# Patient Record
Sex: FEMALE | Race: WHITE | Employment: OTHER | URBAN - METROPOLITAN AREA
[De-identification: names, ages, dates, MRNs, and addresses within clinical notes are randomized per-mention and may not be internally consistent; named-entity substitution may affect disease eponyms.]

---

## 2018-01-04 ENCOUNTER — HOSPITAL ENCOUNTER (OUTPATIENT)
Dept: CT IMAGING | Age: 73
Discharge: HOME | End: 2018-01-04
Attending: UROLOGY | Admitting: UROLOGY

## 2018-01-04 DIAGNOSIS — N39.41 URGE INCONTINENCE: ICD-10-CM

## 2018-01-04 LAB
BUN BLDV-MCNC: 15 MG/DL (ref 7–20)
CREAT SERPL-MCNC: 1.2 MG/DL (ref 0.6–1.2)
GFR AFRICAN AMERICAN: 53
GFR NON-AFRICAN AMERICAN: 44

## 2020-11-06 ENCOUNTER — HOSPITAL ENCOUNTER (OUTPATIENT)
Dept: ULTRASOUND IMAGING | Age: 75
Discharge: HOME | End: 2020-11-06
Payer: MEDICARE

## 2020-11-06 PROCEDURE — 76770 US EXAM ABDO BACK WALL COMP: CPT

## 2022-12-14 ENCOUNTER — APPOINTMENT (OUTPATIENT)
Dept: GENERAL RADIOLOGY | Age: 77
End: 2022-12-14
Payer: MEDICARE

## 2022-12-14 ENCOUNTER — HOSPITAL ENCOUNTER (EMERGENCY)
Age: 77
Discharge: HOME | End: 2022-12-14
Payer: MEDICARE

## 2022-12-14 ENCOUNTER — APPOINTMENT (OUTPATIENT)
Dept: CT IMAGING | Age: 77
End: 2022-12-14
Payer: MEDICARE

## 2022-12-14 VITALS
HEART RATE: 69 BPM | DIASTOLIC BLOOD PRESSURE: 88 MMHG | TEMPERATURE: 98.3 F | OXYGEN SATURATION: 100 % | WEIGHT: 135 LBS | HEIGHT: 67 IN | BODY MASS INDEX: 21.19 KG/M2 | SYSTOLIC BLOOD PRESSURE: 144 MMHG | RESPIRATION RATE: 14 BRPM

## 2022-12-14 DIAGNOSIS — S20.211A CHEST WALL CONTUSION, RIGHT, INITIAL ENCOUNTER: Primary | ICD-10-CM

## 2022-12-14 DIAGNOSIS — R42 LIGHTHEADEDNESS: ICD-10-CM
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
## 2022-12-14 LAB
A/G RATIO: 1.6 (ref 1.1–2.2)
ALBUMIN SERPL-MCNC: 4.5 G/DL (ref 3.4–5)
ALP BLD-CCNC: 70 U/L (ref 40–129)
ALT SERPL-CCNC: 12 U/L (ref 10–40)
ANION GAP SERPL CALCULATED.3IONS-SCNC: 12 MMOL/L (ref 3–16)
AST SERPL-CCNC: 15 U/L (ref 15–37)
BASOPHILS ABSOLUTE: 0 K/UL (ref 0–0.2)
BASOPHILS RELATIVE PERCENT: 0.7 %
BILIRUB SERPL-MCNC: <0.2 MG/DL (ref 0–1)
BUN BLDV-MCNC: 18 MG/DL (ref 7–20)
CALCIUM SERPL-MCNC: 9.2 MG/DL (ref 8.3–10.6)
CHLORIDE BLD-SCNC: 102 MMOL/L (ref 99–110)
CO2: 25 MMOL/L (ref 21–32)
CREAT SERPL-MCNC: 1 MG/DL (ref 0.6–1.2)
EKG ATRIAL RATE: 64 BPM
EKG DIAGNOSIS: NORMAL
EKG P AXIS: 43 DEGREES
EKG P-R INTERVAL: 152 MS
EKG Q-T INTERVAL: 438 MS
EKG QRS DURATION: 78 MS
EKG QTC CALCULATION (BAZETT): 451 MS
EKG R AXIS: 9 DEGREES
EKG T AXIS: 40 DEGREES
EKG VENTRICULAR RATE: 64 BPM
EOSINOPHILS ABSOLUTE: 0.2 K/UL (ref 0–0.6)
EOSINOPHILS RELATIVE PERCENT: 3.4 %
GFR SERPL CREATININE-BSD FRML MDRD: 57 ML/MIN/{1.73_M2}
GLUCOSE BLD-MCNC: 166 MG/DL (ref 70–99)
HCT VFR BLD CALC: 34.1 % (ref 36–48)
HEMOGLOBIN: 11.3 G/DL (ref 12–16)
LYMPHOCYTES ABSOLUTE: 1.1 K/UL (ref 1–5.1)
LYMPHOCYTES RELATIVE PERCENT: 16.5 %
MCH RBC QN AUTO: 28.1 PG (ref 26–34)
MCHC RBC AUTO-ENTMCNC: 33.1 G/DL (ref 31–36)
MCV RBC AUTO: 85 FL (ref 80–100)
MONOCYTES ABSOLUTE: 0.3 K/UL (ref 0–1.3)
MONOCYTES RELATIVE PERCENT: 4.7 %
NEUTROPHILS ABSOLUTE: 5.1 K/UL (ref 1.7–7.7)
NEUTROPHILS RELATIVE PERCENT: 74.7 %
PDW BLD-RTO: 16.1 % (ref 12.4–15.4)
PLATELET # BLD: 206 K/UL (ref 135–450)
PMV BLD AUTO: 8.3 FL (ref 5–10.5)
POTASSIUM SERPL-SCNC: 4.9 MMOL/L (ref 3.5–5.1)
RBC # BLD: 4.01 M/UL (ref 4–5.2)
SODIUM BLD-SCNC: 139 MMOL/L (ref 136–145)
TOTAL PROTEIN: 7.3 G/DL (ref 6.4–8.2)
WBC # BLD: 6.9 K/UL (ref 4–11)

## 2022-12-14 PROCEDURE — 93005 ELECTROCARDIOGRAM TRACING: CPT | Performed by: EMERGENCY MEDICINE

## 2022-12-14 PROCEDURE — 80053 COMPREHEN METABOLIC PANEL: CPT

## 2022-12-14 PROCEDURE — 71045 X-RAY EXAM CHEST 1 VIEW: CPT

## 2022-12-14 PROCEDURE — 85025 COMPLETE CBC W/AUTO DIFF WBC: CPT

## 2022-12-14 PROCEDURE — 99284 EMERGENCY DEPT VISIT MOD MDM: CPT

## 2022-12-14 PROCEDURE — 72125 CT NECK SPINE W/O DYE: CPT

## 2022-12-14 PROCEDURE — 6370000000 HC RX 637 (ALT 250 FOR IP): Performed by: PHYSICIAN ASSISTANT

## 2022-12-14 PROCEDURE — 71250 CT THORAX DX C-: CPT

## 2022-12-14 PROCEDURE — 93010 ELECTROCARDIOGRAM REPORT: CPT | Performed by: INTERNAL MEDICINE

## 2022-12-14 PROCEDURE — 70450 CT HEAD/BRAIN W/O DYE: CPT

## 2022-12-14 RX ORDER — HYDROCODONE BITARTRATE AND ACETAMINOPHEN 5; 325 MG/1; MG/1
0.5 TABLET ORAL ONCE
Status: COMPLETED | OUTPATIENT
Start: 2022-12-14 | End: 2022-12-14

## 2022-12-14 RX ORDER — ACETAMINOPHEN 325 MG/1
650 TABLET ORAL ONCE
Status: COMPLETED | OUTPATIENT
Start: 2022-12-14 | End: 2022-12-14

## 2022-12-14 RX ADMIN — HYDROCODONE BITARTRATE AND ACETAMINOPHEN 0.5 TABLET: 5; 325 TABLET ORAL at 14:38

## 2022-12-14 RX ADMIN — ACETAMINOPHEN 650 MG: 325 TABLET ORAL at 14:38

## 2022-12-14 ASSESSMENT — PAIN DESCRIPTION - LOCATION: LOCATION: BACK

## 2022-12-14 ASSESSMENT — PAIN SCALES - GENERAL
PAINLEVEL_OUTOF10: 7
PAINLEVEL_OUTOF10: 6

## 2022-12-14 ASSESSMENT — PAIN - FUNCTIONAL ASSESSMENT: PAIN_FUNCTIONAL_ASSESSMENT: 0-10

## 2022-12-14 NOTE — DISCHARGE INSTRUCTIONS
Use the incentive spirometer as instructed and given to you at the hospital.  This is to prevent pneumonia. While in the hospital I did give you Tylenol 650 mg and hydrocodone 5 mg. I did obtain CT scan of your head, neck and chest.  No evidence of chest wall fracture. I do believe you have a bruising of your chest wall. I do recommend Tylenol 1000 mg every 6 or 8 hours. I do not recommend anti-inflammatory such as ibuprofen or Aleve. In the emergency room I did give you hydrocodone 1/2 tablet along with Tylenol 650 mg.  I do recommend he follow with your healthcare provider on Friday. I do recommend use of walker and being careful when standing and ambulating at home.

## 2022-12-14 NOTE — ED PROVIDER NOTES
[facility]  ED  EMERGENCY DEPARTMENT ENCOUNTER        Pt Name: Daljit Angulo  MRN: [id number]  Leydi [birth year]  Date of evaluation: 12/14/2022  Provider: Zita Amaya PA-C  PCP: Suzette Miller MD  Note Started: 11:11 AM EST 12/14/22      MAEVE. I have evaluated this patient. My supervising physician was available for consultation. CHIEF COMPLAINT       Chief Complaint   Patient presents with    Fall     Dizzy, fell, pt reports npo LOC, on ASA       HISTORY OF PRESENT ILLNESS   (Location, Timing/Onset, Context/Setting, Quality, Duration, Modifying Factors, Severity, Associated Signs and Symptoms)  Note limiting factors. Chief Complaint: Dizziness, fall    Daljit Angulo is a 76 y.o. female who presents to the emergency department by EMS. The patient called [facility]. The patient presents for evaluation of right chest wall injury. The patient states at 7:45 AM this morning she got up from bed too quickly and had to go the bathroom. She fell on the carpet because she was dizzy. She does experience episodes of dizziness times years and often averaging 1-2 times a week. This is not new. She typically uses a walker but did not use the walker this morning. She indicates not striking head. No headache or neck pain complaint. She is currently not on anticoagulant including aspirin. Her primary complaint is right chest wall pain. Worse with movement, sneeze, cough or deep breath. She does smoke. She indicates no hemoptysis. She indicates no abdominal pain. She indicates no extremity pain complaints. Nursing Notes were all reviewed and agreed with or any disagreements were addressed in the HPI. REVIEW OF SYSTEMS    (2-9 systems for level 4, 10 or more for level 5)     Review of Systems    Positives and Pertinent negatives as per HPI. Except as noted above in the ROS, all other systems were reviewed and negative.        PAST MEDICAL HISTORY     Past Medical History:   Diagnosis Date    Meredith esophagus     Bladder prolapse     Depression     Diabetes mellitus (HCC)     Hyperlipidemia     Osteoporosis     Tachycardia          SURGICAL HISTORY     Past Surgical History:   Procedure Laterality Date    BACK SURGERY      cervical    BLADDER SUSPENSION      X 3    CHOLECYSTECTOMY      COLONOSCOPY  5-3-16    normal    HYSTERECTOMY (CERVIX STATUS UNKNOWN)      NOSE SURGERY      fracture    TOE SURGERY      UPPER GASTROINTESTINAL ENDOSCOPY  2/11/13    UPPER GASTROINTESTINAL ENDOSCOPY  5-3-16    biopsy esophagus + dilatation    WRIST SURGERY           CURRENTMEDICATIONS       Previous Medications    BISOPROLOL (ZEBETA) 5 MG TABLET    Take 5 mg by mouth daily. METFORMIN (GLUCOPHAGE) 500 MG TABLET    Take 500 mg by mouth 2 times daily (with meals). OMEPRAZOLE (PRILOSEC) 20 MG CAPSULE    Take 1 capsule by mouth 2 times daily. OXYBUTYNIN (DITROPAN XL) 15 MG CR TABLET    Take 15 mg by mouth daily. SIMVASTATIN (ZOCOR) 20 MG TABLET    Take 20 mg by mouth nightly. VITAMIN D (ERGOCALCIFEROL) 13230 UNITS CAPS CAPSULE    Take 50,000 Units by mouth once a week    ZOLPIDEM (AMBIEN) 10 MG TABLET    Take 10 mg by mouth nightly as needed. ALLERGIES     Food    FAMILYHISTORY       Family History   Problem Relation Age of Onset    Diabetes Father     Cancer Sister         cervical    Diabetes Sister           SOCIAL HISTORY       Social History     Tobacco Use    Smoking status: Every Day     Packs/day: 0.50     Types: Cigarettes   Substance Use Topics    Alcohol use:  Yes     Alcohol/week: 2.0 standard drinks     Types: 2 Cans of beer per week       SCREENINGS        [place] Coma Scale  Eye Opening: Spontaneous  Best Verbal Response: Oriented  Best Motor Response: Obeys commands  Dina Coma Scale Score: 15                CIWA Assessment  BP: (!) 144/88  Heart Rate: 69             PHYSICAL EXAM    (up to 7 for level 4, 8 or more for level 5)     ED Triage Vitals [12/14/22 1041]   BP Temp Temp Source Heart Rate Resp SpO2 Height Weight   (!) 157/59 98 °F (36.7 °C) Oral 52 14 100 % 5' 7\" (1.702 m) 135 lb (61.2 kg)       Physical Exam  Vitals and nursing note reviewed. Constitutional:       Appearance: Normal appearance. She is well-developed and normal weight. HENT:      Head: Normocephalic and atraumatic. Right Ear: External ear normal.      Left Ear: External ear normal.   Eyes:      General: No scleral icterus. Right eye: No discharge. Left eye: No discharge. Extraocular Movements: Extraocular movements intact. Conjunctiva/sclera: Conjunctivae normal.      Pupils: Pupils are equal, round, and reactive to light. Cardiovascular:      Rate and Rhythm: Normal rate and regular rhythm. Heart sounds: Normal heart sounds. Pulmonary:      Effort: Pulmonary effort is normal.      Breath sounds: Normal breath sounds. Comments: The patient with chest wall tenderness on the right lateral chest wall. No rotation or instability. No skin cutaneous air. Breath sounds are equal bilaterally. Chest:      Chest wall: Tenderness present. Abdominal:      General: Abdomen is flat. Bowel sounds are normal.      Palpations: Abdomen is soft. Tenderness: There is no abdominal tenderness. Musculoskeletal:         General: No tenderness. Normal range of motion. Cervical back: Normal range of motion and neck supple. No tenderness. Right lower leg: No edema. Left lower leg: No edema. Comments: I do palpate the patient's entire spine. No pain elicited. Skin:     General: Skin is warm and dry. Neurological:      General: No focal deficit present. Mental Status: She is alert and oriented to person, place, and time. Mental status is at baseline. Psychiatric:         Mood and Affect: Mood normal.         Behavior: Behavior normal.         Thought Content:  Thought content normal.         Judgment: Judgment normal. DIAGNOSTIC RESULTS   LABS:    Labs Reviewed   COMPREHENSIVE METABOLIC PANEL - Abnormal; Notable for the following components:       Result Value    Glucose 166 (*)     Est, Glom Filt Rate 57 (*)     All other components within normal limits   CBC WITH AUTO DIFFERENTIAL - Abnormal; Notable for the following components:    Hemoglobin 11.3 (*)     Hematocrit 34.1 (*)     RDW 16.1 (*)     All other components within normal limits   URINALYSIS       When ordered only abnormal lab results are displayed. All other labs were within normal range or not returned as of this dictation. EKG: When ordered, EKG's are interpreted by the Emergency Department Physician in the absence of a cardiologist.  Please see their note for interpretation of EKG. RADIOLOGY:   Non-plain film images such as CT, Ultrasound and MRI are read by the radiologist. Plain radiographic images are visualized and preliminarily interpreted by the ED Provider with the below findings:        Interpretation per the Radiologist below, if available at the time of this note:    CT CSpine W/O Contrast   Final Result   1. No acute cervical spine fracture. 2. Spinal degenerative changes as described. 3. Straightening of the spine which could be related to patient positioning,   degenerative changes or muscle spasm. CT Head W/O Contrast   Final Result   No acute intracranial abnormality. CT CHEST WO CONTRAST   Final Result   1. No acute cardiopulmonary findings or acute fractures. 2.  Small hiatal hernia. XR CHEST PORTABLE   Final Result   1. No acute airspace infiltrates. 2.  6 mm pulmonary nodule in the left upper lobe. This could be further   evaluated with a CT chest as an outpatient per [facility]   guidelines. No results found. No results found.     PROCEDURES   Unless otherwise noted below, none     Procedures    CRITICAL CARE TIME       CONSULTS:  None      EMERGENCY DEPARTMENT COURSE and DIFFERENTIAL DIAGNOSIS/MDM:   Vitals:    Vitals:    12/14/22 1041 12/14/22 1359   BP: (!) 157/59 (!) 144/88   Pulse: 52 69   Resp: 14 14   Temp: 98 °F (36.7 °C) 98.3 °F (36.8 °C)   TempSrc: Oral Oral   SpO2: 100% 100%   Weight: 135 lb (61.2 kg)    Height: 5' 7\" (1.702 m)        Patient was given the following medications:  Medications   acetaminophen (TYLENOL) tablet 650 mg (has no administration in time range)   HYDROcodone-acetaminophen (NORCO) 5-325 MG per tablet 0.5 tablet (has no administration in time range)         Is this patient to be included in the SEP-1 Core Measure due to severe sepsis or septic shock? No   Exclusion criteria - the patient is NOT to be included for SEP-1 Core Measure due to: Infection is not suspected    This patient with history of lightheadedness/dizziness occurring times years and occurring once or twice a week. She does report needing to get out of bed go to the bathroom she did so rather quickly and did not use walker. She sustained a fall event. Does not recall striking head however she did strike the right lateral chest wall. I did obtain CT scan head, neck and chest.  No fractures particular no rib fractures. She is quite tender on exam.  I did have nursing staff provide incentive spirometer with instructions. She was given Tylenol 650 mg p.o. along with hydrocodone 5 mg 1/2 tablet. I do recommend at home using incentive spirometer as well as Tylenol 1000 mg every 6 or 8 hours. I recommended follow with her PCP on Friday. I have instructed the patient to get up slowly and carefully and to use her walker. She agrees and expresses understanding. FINAL IMPRESSION      1. Chest wall contusion, right, initial encounter    2. Lightheadedness    3.  Fall, initial encounter          DISPOSITION/PLAN   DISPOSITION Decision To Discharge 12/14/2022 02:26:30 PM      PATIENT REFERRED TO:  MD Martínez Yates [id number]  [phone]    Schedule an appointment as soon as possible for a visit in 2 days      [facility]  ED  [address]  Go to   If symptoms worsen    DISCHARGE MEDICATIONS:  New Prescriptions    No medications on file       DISCONTINUED MEDICATIONS:  Discontinued Medications    No medications on file              (Please note that portions of this note were completed with a voice recognition program.  Efforts were made to edit the dictations but occasionally words are mis-transcribed. )    Terrence Monge PA-C (electronically signed)            Terrence Monge PA-C  12/14/22 7783

## 2022-12-14 NOTE — ED PROVIDER NOTES
I was not asked to see this patient. I was available for consultation if deemed necessary. I was only asked to interpret the EKG. Please see REBECCA Rodriguez's note for care of the patient while in the emergency department and for final disposition. The Ekg interpreted by me shows  normal sinus rhythm with a rate of 64  Axis is   Normal  QTc is  normal  Intervals and Durations are unremarkable.       ST Segments: nonspecific changes  No significant change from prior EKG dated - no old EKG  No STEMI           Geoffrey Ibanez MD  12/14/22 7114

## 2023-08-07 ENCOUNTER — APPOINTMENT (OUTPATIENT)
Dept: CT IMAGING | Age: 78
End: 2023-08-07
Payer: MEDICARE

## 2023-08-07 ENCOUNTER — HOSPITAL ENCOUNTER (EMERGENCY)
Age: 78
Discharge: HOME | End: 2023-08-07
Payer: MEDICARE

## 2023-08-07 VITALS
RESPIRATION RATE: 16 BRPM | HEIGHT: 68 IN | OXYGEN SATURATION: 97 % | BODY MASS INDEX: 20.46 KG/M2 | TEMPERATURE: 98.6 F | HEART RATE: 72 BPM | DIASTOLIC BLOOD PRESSURE: 63 MMHG | WEIGHT: 135 LBS | SYSTOLIC BLOOD PRESSURE: 134 MMHG

## 2023-08-07 DIAGNOSIS — E83.42 HYPOMAGNESEMIA: ICD-10-CM

## 2023-08-07 DIAGNOSIS — R07.89 LEFT-SIDED CHEST WALL PAIN: ICD-10-CM

## 2023-08-07 DIAGNOSIS — E86.0 DEHYDRATION: ICD-10-CM
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
## 2023-08-07 LAB
ALBUMIN SERPL-MCNC: 4.4 G/DL (ref 3.4–5)
ALBUMIN/GLOB SERPL: 1.3 {RATIO} (ref 1.1–2.2)
ALP SERPL-CCNC: 70 U/L (ref 40–129)
ALT SERPL-CCNC: 17 U/L (ref 10–40)
ANION GAP SERPL CALCULATED.3IONS-SCNC: 16 MMOL/L (ref 3–16)
AST SERPL-CCNC: 17 U/L (ref 15–37)
BASOPHILS # BLD: 0.1 K/UL (ref 0–0.2)
BASOPHILS NFR BLD: 0.6 %
BILIRUB SERPL-MCNC: <0.2 MG/DL (ref 0–1)
BILIRUB UR QL STRIP.AUTO: NEGATIVE
BUN SERPL-MCNC: 16 MG/DL (ref 7–20)
CALCIUM SERPL-MCNC: 9.3 MG/DL (ref 8.3–10.6)
CHLORIDE SERPL-SCNC: 102 MMOL/L (ref 99–110)
CK SERPL-CCNC: 67 U/L (ref 26–192)
CLARITY UR: CLEAR
CO2 SERPL-SCNC: 18 MMOL/L (ref 21–32)
COLOR UR: YELLOW
CREAT SERPL-MCNC: 1.4 MG/DL (ref 0.6–1.2)
DEPRECATED RDW RBC AUTO: 17.3 % (ref 12.4–15.4)
EKG ATRIAL RATE: 81 BPM
EKG DIAGNOSIS: NORMAL
EKG P AXIS: 35 DEGREES
EKG P-R INTERVAL: 150 MS
EKG Q-T INTERVAL: 362 MS
EKG QRS DURATION: 66 MS
EKG QTC CALCULATION (BAZETT): 420 MS
EKG R AXIS: -4 DEGREES
EKG T AXIS: 21 DEGREES
EKG VENTRICULAR RATE: 81 BPM
EOSINOPHIL # BLD: 0.3 K/UL (ref 0–0.6)
EOSINOPHIL NFR BLD: 2.6 %
EPI CELLS #/AREA URNS HPF: ABNORMAL /HPF (ref 0–5)
GFR SERPLBLD CREATININE-BSD FMLA CKD-EPI: 38 ML/MIN/{1.73_M2}
GLUCOSE SERPL-MCNC: 175 MG/DL (ref 70–99)
GLUCOSE UR STRIP.AUTO-MCNC: NEGATIVE MG/DL
HCT VFR BLD AUTO: 34.1 % (ref 36–48)
HGB BLD-MCNC: 10.8 G/DL (ref 12–16)
HGB UR QL STRIP.AUTO: NEGATIVE
KETONES UR STRIP.AUTO-MCNC: ABNORMAL MG/DL
LEUKOCYTE ESTERASE UR QL STRIP.AUTO: ABNORMAL
LYMPHOCYTES # BLD: 1.6 K/UL (ref 1–5.1)
LYMPHOCYTES NFR BLD: 12.3 %
MAGNESIUM SERPL-MCNC: 1.1 MG/DL (ref 1.8–2.4)
MCH RBC QN AUTO: 27.4 PG (ref 26–34)
MCHC RBC AUTO-ENTMCNC: 31.6 G/DL (ref 31–36)
MCV RBC AUTO: 86.6 FL (ref 80–100)
MONOCYTES # BLD: 0.6 K/UL (ref 0–1.3)
MONOCYTES NFR BLD: 4.9 %
NEUTROPHILS # BLD: 10.2 K/UL (ref 1.7–7.7)
NEUTROPHILS NFR BLD: 79.6 %
NITRITE UR QL STRIP.AUTO: NEGATIVE
PH UR STRIP.AUTO: 6 [PH] (ref 5–8)
PLATELET # BLD AUTO: 281 K/UL (ref 135–450)
PMV BLD AUTO: 9.3 FL (ref 5–10.5)
POTASSIUM SERPL-SCNC: 5.3 MMOL/L (ref 3.5–5.1)
PROT SERPL-MCNC: 7.7 G/DL (ref 6.4–8.2)
PROT UR STRIP.AUTO-MCNC: NEGATIVE MG/DL
RBC # BLD AUTO: 3.94 M/UL (ref 4–5.2)
RBC #/AREA URNS HPF: ABNORMAL /HPF (ref 0–4)
SODIUM SERPL-SCNC: 136 MMOL/L (ref 136–145)
SP GR UR STRIP.AUTO: 1.01 (ref 1–1.03)
TROPONIN, HIGH SENSITIVITY: 9 NG/L (ref 0–14)
UA DIPSTICK W REFLEX MICRO PNL UR: YES
URN SPEC COLLECT METH UR: ABNORMAL
UROBILINOGEN UR STRIP-ACNC: 0.2 E.U./DL
WBC # BLD AUTO: 12.8 K/UL (ref 4–11)
WBC #/AREA URNS HPF: ABNORMAL /HPF (ref 0–5)

## 2023-08-07 PROCEDURE — 81001 URINALYSIS AUTO W/SCOPE: CPT

## 2023-08-07 PROCEDURE — 96366 THER/PROPH/DIAG IV INF ADDON: CPT

## 2023-08-07 PROCEDURE — 83735 ASSAY OF MAGNESIUM: CPT

## 2023-08-07 PROCEDURE — 96365 THER/PROPH/DIAG IV INF INIT: CPT

## 2023-08-07 PROCEDURE — 99284 EMERGENCY DEPT VISIT MOD MDM: CPT

## 2023-08-07 PROCEDURE — 93010 ELECTROCARDIOGRAM REPORT: CPT | Performed by: INTERNAL MEDICINE

## 2023-08-07 PROCEDURE — 82550 ASSAY OF CK (CPK): CPT

## 2023-08-07 PROCEDURE — 85025 COMPLETE CBC W/AUTO DIFF WBC: CPT

## 2023-08-07 PROCEDURE — 6360000002 HC RX W HCPCS: Performed by: PHYSICIAN ASSISTANT

## 2023-08-07 PROCEDURE — 2580000003 HC RX 258: Performed by: PHYSICIAN ASSISTANT

## 2023-08-07 PROCEDURE — 72125 CT NECK SPINE W/O DYE: CPT

## 2023-08-07 PROCEDURE — 80053 COMPREHEN METABOLIC PANEL: CPT

## 2023-08-07 PROCEDURE — 93005 ELECTROCARDIOGRAM TRACING: CPT | Performed by: PHYSICIAN ASSISTANT

## 2023-08-07 PROCEDURE — 6370000000 HC RX 637 (ALT 250 FOR IP): Performed by: PHYSICIAN ASSISTANT

## 2023-08-07 PROCEDURE — 84484 ASSAY OF TROPONIN QUANT: CPT

## 2023-08-07 PROCEDURE — 71250 CT THORAX DX C-: CPT

## 2023-08-07 PROCEDURE — 70450 CT HEAD/BRAIN W/O DYE: CPT

## 2023-08-07 PROCEDURE — 96375 TX/PRO/DX INJ NEW DRUG ADDON: CPT

## 2023-08-07 RX ORDER — MAGNESIUM SULFATE IN WATER 40 MG/ML
2000 INJECTION, SOLUTION INTRAVENOUS ONCE
Status: COMPLETED | OUTPATIENT
Start: 2023-08-07 | End: 2023-08-07

## 2023-08-07 RX ORDER — ONDANSETRON 2 MG/ML
4 INJECTION INTRAMUSCULAR; INTRAVENOUS ONCE
Status: COMPLETED | OUTPATIENT
Start: 2023-08-07 | End: 2023-08-07

## 2023-08-07 RX ORDER — OXYCODONE HYDROCHLORIDE AND ACETAMINOPHEN 5; 325 MG/1; MG/1
1 TABLET ORAL ONCE
Status: COMPLETED | OUTPATIENT
Start: 2023-08-07 | End: 2023-08-07

## 2023-08-07 RX ORDER — MORPHINE SULFATE 4 MG/ML
4 INJECTION, SOLUTION INTRAMUSCULAR; INTRAVENOUS ONCE
Status: COMPLETED | OUTPATIENT
Start: 2023-08-07 | End: 2023-08-07

## 2023-08-07 RX ORDER — 0.9 % SODIUM CHLORIDE 0.9 %
1000 INTRAVENOUS SOLUTION INTRAVENOUS ONCE
Status: COMPLETED | OUTPATIENT
Start: 2023-08-07 | End: 2023-08-07

## 2023-08-07 RX ORDER — OXYCODONE HYDROCHLORIDE AND ACETAMINOPHEN 5; 325 MG/1; MG/1
1 TABLET ORAL EVERY 6 HOURS PRN
Qty: 12 TABLET | Refills: 0 | Status: SHIPPED | OUTPATIENT
Start: 2023-08-07 | End: 2023-08-10

## 2023-08-07 RX ADMIN — ONDANSETRON 4 MG: 2 INJECTION INTRAMUSCULAR; INTRAVENOUS at 10:32

## 2023-08-07 RX ADMIN — SODIUM CHLORIDE 1000 ML: 9 INJECTION, SOLUTION INTRAVENOUS at 11:51

## 2023-08-07 RX ADMIN — MORPHINE SULFATE 4 MG: 4 INJECTION, SOLUTION INTRAMUSCULAR; INTRAVENOUS at 10:33

## 2023-08-07 RX ADMIN — OXYCODONE HYDROCHLORIDE AND ACETAMINOPHEN 1 TABLET: 5; 325 TABLET ORAL at 15:15

## 2023-08-07 RX ADMIN — MAGNESIUM SULFATE HEPTAHYDRATE 2000 MG: 40 INJECTION, SOLUTION INTRAVENOUS at 11:55

## 2023-08-07 ASSESSMENT — PAIN SCALES - GENERAL
PAINLEVEL_OUTOF10: 9
PAINLEVEL_OUTOF10: 4
PAINLEVEL_OUTOF10: 10
PAINLEVEL_OUTOF10: 10

## 2023-08-07 ASSESSMENT — PAIN DESCRIPTION - LOCATION
LOCATION: RIB CAGE

## 2023-08-07 ASSESSMENT — PAIN DESCRIPTION - ORIENTATION
ORIENTATION: LEFT

## 2023-08-07 ASSESSMENT — PAIN DESCRIPTION - DESCRIPTORS: DESCRIPTORS: SHARP

## 2023-08-07 ASSESSMENT — PAIN - FUNCTIONAL ASSESSMENT: PAIN_FUNCTIONAL_ASSESSMENT: 0-10

## 2023-08-07 NOTE — CARE COORDINATION
Case Management Assessment  Initial Evaluation    Date/Time of Evaluation: 8/7/2023 2:58 PM  Assessment Completed by: TAJ Taylor    If patient is discharged prior to next notation, then this note serves as note for discharge by case management. Patient Name: Holli Liz                   YOB: 1945  Diagnosis: No admission diagnoses are documented for this encounter. Date / Time: 8/7/2023  9:49 AM    Patient Admission Status: Emergency   Readmission Risk (Low < 19, Mod (19-27), High > 27): No data recorded  Current PCP: Ludwig Contreras MD  PCP verified by CM? (P) Yes    Chart Reviewed: Yes      History Provided by: (P) Patient, Child/Family  Patient Orientation: (P) Alert and Oriented    Patient Cognition: (P) Alert    Hospitalization in the last 30 days (Readmission):  No    If yes, Readmission Assessment in  Navigator will be completed.     Advance Directives:      Code Status: Not on file   Patient's Primary Decision Maker is: (P) Legal Next of Kin      Discharge Planning:    Patient lives with: (P) Alone Type of Home: (P) House  Primary Care Giver: (P) Self  Patient Support Systems include: (P) Children   Current Financial resources: (P) None  Current community resources: (P) ECF/Home Care  Current services prior to admission: (P) Durable Medical Equipment            Current DME: (P) Walker            Type of Home Care services:  (P) PT, OT, Nursing Services    ADLS  Prior functional level: (P) Independent in ADLs/IADLs  Current functional level: (P) Independent in ADLs/IADLs    PT AM-PAC:   /24  OT AM-PAC:   /24    Family can provide assistance at DC: (P) Yes  Would you like Case Management to discuss the discharge plan with any other family members/significant others, and if so, who? (P) No  Plans to Return to Present Housing: (P) Yes  Other Identified Issues/Barriers to RETURNING to current housing: none noted  Potential Assistance needed at discharge: (P)

## 2023-08-07 NOTE — ED PROVIDER NOTES
fall and chest injury. CBC with mild leukocytosis and anemia which according to chart review does appear baseline. CMP with mildly elevated potassium at 5.3 with patient's having also slight elevations in BUN and creatinine at 16 and 1.4. She was given a 1 L IV fluid bolus along with 2 g of magnesium sulfate for her hypomagnesemia at 1.1. Troponin of 9. Urinalysis 6-9 whites with a few epithelial cells present. Total CK of 67. CT of the head and cervical spine were negative for traumatic injuries. EKG appeared consistent with a sinus rhythm and PACs. Nonischemic otherwise. Did obtain a CT of the chest demonstrating no acute rib fractures and/or pneumothoraces. Patient was offered admission for her fall, chest wall injury and inability to get up. Discharged with return precautions as well as recommendations for follow-up. She was provided with an incentive spirometer and educated on its use. Patient is in agreement and comfortable with discharge    Critical Care Time:   10 Minutes of critical care time spent not including separately billable procedures. DDx:  I estimate there is LOW risk for ACUTE CORONARY SYNDROME, INTRACRANIAL HEMORRHAGE, MALIGNANT DYSRHYTHMIA, MENINGITIS, PNEUMONIA, PULMONARY EMBOLISM, SEPSIS, SUBARACHNOID HEMORRHAGE, SUBDURAL HEMATOMA, STROKE, or URINARY TRACT INFECTION, thus I consider the discharge disposition reasonable. Matteo Madrid and I have also discussed returning to the Emergency Department immediately if new or worsening symptoms occur. We have discussed the symptoms which are most concerning (e.g., changing or worsening pain, weakness, vomiting, fever) that necessitate immediate return. FINAL IMPRESSION  1. Fall, initial encounter    2. Left-sided chest wall pain    3. Dehydration    4. Hypomagnesemia      Patient referred to:   Gianluca Holder, [address]  [phone]    Schedule an appointment as soon as possible for a visit

## 2023-08-07 NOTE — ED NOTES
Patient identified as a positive fall risk on the ED triage fall screening. Patient placed in fall precautions which includes:  yellow fall risk bracelet on wrist and yellow socks on feet. Patient instructed on importance of not getting out of bed or ambulating without assistance for safety. Pt verbalized understanding.        Damian Gates RN  08/07/23 3090

## 2023-08-07 NOTE — CARE COORDINATION
[facility]    Referral received from CM to follow for home care services. I will follow for needs, and speak with patient to verify demos.     Sn/pt/ot    Fall;dm    Rocio Crowder RN, BSN CTN  [facility] [phone]

## 2023-08-07 NOTE — ED NOTES
Pt d/c from ED with all discharge paperwork. Pt verbalized understanding of education. Pt educated on Incentive spirometry and used teach back method to show understanding. Pt pushed out of ED in wheelchair, and being transported home with son. Pain management discussed during discharge teaching. No additional needs or questions.        Fredis Menjivar RN  08/07/23 4353

## 2023-10-27 ENCOUNTER — OFFICE VISIT (OUTPATIENT)
Dept: ORTHOPEDIC SURGERY | Age: 78
End: 2023-10-27

## 2023-10-27 VITALS — HEIGHT: 68 IN | BODY MASS INDEX: 20.46 KG/M2 | WEIGHT: 135 LBS

## 2023-10-27 DIAGNOSIS — S42.292A OTHER CLOSED DISPLACED FRACTURE OF PROXIMAL END OF LEFT HUMERUS, INITIAL ENCOUNTER: Primary | ICD-10-CM

## 2023-10-27 DIAGNOSIS — M25.512 PAIN OF LEFT SHOULDER REGION: ICD-10-CM

## 2023-10-27 RX ORDER — TRAMADOL HYDROCHLORIDE 50 MG/1
50 TABLET ORAL EVERY 6 HOURS PRN
Qty: 28 TABLET | Refills: 0 | Status: SHIPPED | OUTPATIENT
Start: 2023-10-27 | End: 2023-11-03

## 2023-10-30 ENCOUNTER — OFFICE VISIT (OUTPATIENT)
Dept: ORTHOPEDIC SURGERY | Age: 78
End: 2023-10-30
Payer: MEDICARE

## 2023-10-30 VITALS — BODY MASS INDEX: 20.46 KG/M2 | WEIGHT: 135 LBS | HEIGHT: 68 IN

## 2023-10-30 DIAGNOSIS — S42.292A OTHER CLOSED DISPLACED FRACTURE OF PROXIMAL END OF LEFT HUMERUS, INITIAL ENCOUNTER: Primary | ICD-10-CM

## 2023-10-30 DIAGNOSIS — M25.512 LEFT SHOULDER PAIN, UNSPECIFIED CHRONICITY: ICD-10-CM

## 2023-10-30 PROCEDURE — 1123F ACP DISCUSS/DSCN MKR DOCD: CPT | Performed by: ORTHOPAEDIC SURGERY

## 2023-10-30 PROCEDURE — 99204 OFFICE O/P NEW MOD 45 MIN: CPT | Performed by: ORTHOPAEDIC SURGERY

## 2023-10-30 NOTE — PROGRESS NOTES
Outpatient Medications:     traMADol (ULTRAM) 50 MG tablet, Take 1 tablet by mouth every 6 hours as needed for Pain for up to 7 days. Intended supply: 7 days. Take lowest dose possible to manage pain Max Daily Amount: 200 mg, Disp: 28 tablet, Rfl: 0    Magnesium 200 MG CHEW, Take 1 tablet by mouth daily, Disp: 4 tablet, Rfl: 0    vitamin D (ERGOCALCIFEROL) 81041 UNITS CAPS capsule, Take 1 capsule by mouth once a week, Disp: , Rfl:     simvastatin (ZOCOR) 20 MG tablet, Take 1 tablet by mouth nightly, Disp: , Rfl:     oxybutynin (DITROPAN XL) 15 MG CR tablet, Take 1 tablet by mouth daily, Disp: , Rfl:     omeprazole (PRILOSEC) 20 MG capsule, Take 1 capsule by mouth 2 times daily. , Disp: 30 capsule, Rfl: 3    bisoprolol (ZEBETA) 5 MG tablet, Take 1 tablet by mouth daily, Disp: , Rfl:     metformin (GLUCOPHAGE) 500 MG tablet, Take 1 tablet by mouth 2 times daily (with meals), Disp: , Rfl:      Social history: Denies IV drug use. Social History     Socioeconomic History    Marital status:      Spouse name: Not on file    Number of children: Not on file    Years of education: Not on file    Highest education level: Not on file   Occupational History    Not on file   Tobacco Use    Smoking status: Every Day     Packs/day: .5     Types: Cigarettes    Smokeless tobacco: Not on file   Substance and Sexual Activity    Alcohol use: Yes     Alcohol/week: 2.0 standard drinks of alcohol     Types: 2 Cans of beer per week    Drug use: Not on file    Sexual activity: Not on file   Other Topics Concern    Not on file   Social History Narrative    Not on file     Social Determinants of Health     Financial Resource Strain: Not on file   Food Insecurity: Not on file   Transportation Needs: Not on file   Physical Activity: Not on file   Stress: Not on file   Social Connections: Not on file   Intimate Partner Violence: Not on file   Housing Stability: Not on file     Tobacco use.     Social History     Tobacco Use   Smoking

## 2023-11-06 ENCOUNTER — OFFICE VISIT (OUTPATIENT)
Dept: ORTHOPEDIC SURGERY | Age: 78
End: 2023-11-06
Payer: MEDICARE

## 2023-11-06 VITALS — HEIGHT: 67 IN | WEIGHT: 135 LBS | BODY MASS INDEX: 21.19 KG/M2

## 2023-11-06 DIAGNOSIS — S42.292D OTHER CLOSED DISPLACED FRACTURE OF PROXIMAL END OF LEFT HUMERUS WITH ROUTINE HEALING, SUBSEQUENT ENCOUNTER: Primary | ICD-10-CM

## 2023-11-06 DIAGNOSIS — M25.512 LEFT SHOULDER PAIN, UNSPECIFIED CHRONICITY: ICD-10-CM

## 2023-11-06 PROCEDURE — 99213 OFFICE O/P EST LOW 20 MIN: CPT | Performed by: ORTHOPAEDIC SURGERY

## 2023-11-06 PROCEDURE — 1123F ACP DISCUSS/DSCN MKR DOCD: CPT | Performed by: ORTHOPAEDIC SURGERY

## 2023-11-06 RX ORDER — ATORVASTATIN CALCIUM 40 MG/1
1 TABLET, FILM COATED ORAL DAILY
COMMUNITY
Start: 2023-04-27

## 2023-11-06 RX ORDER — LORAZEPAM 0.5 MG/1
0.5 TABLET ORAL DAILY PRN
COMMUNITY
Start: 2020-10-25

## 2023-11-06 RX ORDER — KETOCONAZOLE 20 MG/G
CREAM TOPICAL
COMMUNITY
Start: 2020-10-06

## 2023-11-06 RX ORDER — FLUTICASONE PROPIONATE 50 MCG
SPRAY, SUSPENSION (ML) NASAL
COMMUNITY
Start: 2020-08-03

## 2023-11-06 RX ORDER — VALACYCLOVIR HYDROCHLORIDE 1 G/1
TABLET, FILM COATED ORAL
COMMUNITY
Start: 2020-04-16

## 2023-11-06 RX ORDER — MECLIZINE HCL 12.5 MG/1
TABLET ORAL
COMMUNITY
Start: 2023-08-24

## 2023-11-06 NOTE — PROGRESS NOTES
have any difficulty with working through her urologist  -I did review with her that there is angulation present albeit still impacted fracture. I did review with her likelihood for change in overall functional range of motion after healing versus surgical intervention. Understand the risk and benefits of nonoperative versus operative measures, she is adamant at not pursuing surgical intervention. At this time nonoperative nonsurgical management will be continued.  -All questions answered to the patient's satisfaction and the patient expressed understanding and agreement with the above listed treatment plan  -Follow up in 1 week's time with repeat 4 view left shoulder substituted with [place]  -Thank you for the clinical consultation and allowing me to participate in the patient's care. Electronically signed by Sharan Jain MD on 11/6/23 at 1:19 PM TALA Jain MD       Orthopaedic Surgery-Sports Medicine    Disclaimer: This note was dictated with voice recognition software. Though review and correction are routinely performed, please contact the office/medical records for any errors requiring correction.

## 2023-11-13 ENCOUNTER — OFFICE VISIT (OUTPATIENT)
Dept: ORTHOPEDIC SURGERY | Age: 78
End: 2023-11-13
Payer: MEDICARE

## 2023-11-13 VITALS — HEIGHT: 67 IN | WEIGHT: 135 LBS | BODY MASS INDEX: 21.19 KG/M2

## 2023-11-13 DIAGNOSIS — S42.292D OTHER CLOSED DISPLACED FRACTURE OF PROXIMAL END OF LEFT HUMERUS WITH ROUTINE HEALING, SUBSEQUENT ENCOUNTER: Primary | ICD-10-CM

## 2023-11-13 PROCEDURE — 99213 OFFICE O/P EST LOW 20 MIN: CPT | Performed by: ORTHOPAEDIC SURGERY

## 2023-11-13 PROCEDURE — 1123F ACP DISCUSS/DSCN MKR DOCD: CPT | Performed by: ORTHOPAEDIC SURGERY

## 2023-11-13 NOTE — PROGRESS NOTES
discontinue the sling at that time for daytime use but will still sleep it for an additional 2 weeks and will have a 2 to 5 pound weight limit at that time. For the time being, NWB and sling use till follow up  -OTC Tylenol per bottle prn discomfort. I recommended continued healthy diet rich in calcium and vitamin D  -I also recommended continue smoking cessation  -All questions answered to the patient's satisfaction and the patient expressed understanding and agreement with the above listed treatment plan  -Follow up in 3 weeks  -Thank you for the clinical consultation and allowing me to participate in the patient's care. Electronically signed by Franck Blunt MD on 11/13/23 at 1:56 PM EST         Franck Blunt MD       Orthopaedic Surgery-Sports Medicine    Disclaimer: This note was dictated with voice recognition software. Though review and correction are routinely performed, please contact the office/medical records for any errors requiring correction.

## 2023-12-04 ENCOUNTER — HOSPITAL ENCOUNTER (OUTPATIENT)
Dept: PHYSICAL THERAPY | Age: 78
Setting detail: THERAPIES SERIES
Discharge: HOME | End: 2023-12-04
Payer: MEDICARE

## 2023-12-04 DIAGNOSIS — M25.512 CHRONIC LEFT SHOULDER PAIN: ICD-10-CM

## 2023-12-04 DIAGNOSIS — S42.292D OTHER CLOSED DISPLACED FRACTURE OF PROXIMAL END OF LEFT HUMERUS WITH ROUTINE HEALING, SUBSEQUENT ENCOUNTER: Primary | ICD-10-CM

## 2023-12-04 DIAGNOSIS — G89.29 CHRONIC LEFT SHOULDER PAIN: ICD-10-CM

## 2023-12-04 PROCEDURE — 97161 PT EVAL LOW COMPLEX 20 MIN: CPT

## 2023-12-04 PROCEDURE — 97110 THERAPEUTIC EXERCISES: CPT

## 2023-12-04 PROCEDURE — 97140 MANUAL THERAPY 1/> REGIONS: CPT

## 2023-12-04 NOTE — FLOWSHEET NOTE
[address] [place] [address] and [facility], [address], [address] office: [phone] fax: [phone]    Physical Therapy: TREATMENT/PROGRESS NOTE   Patient: Yanique Terrazas (48 y.o. female)   Treatment Date: 2023   :  1945 MRN: [id number]   Visit #: 1   Insurance Allowable Auth Needed   Req [place] Basket [x]Yes    []No    Insurance: Payor: Omalis Speed / Plan: Nicko Garcia ESSENTIAL/PLUS / Product Type: *No Product type* /   Insurance ID: [id number] - (Medicare Managed)  Secondary Insurance (if applicable):    Treatment Diagnosis: L shoulder pain M25.512    ICD-10-CM    1. Other closed displaced fracture of proximal end of left humerus with routine healing, subsequent encounter  S42.292D       2.  Chronic left shoulder pain  M25.512     G89.29          Medical Diagnosis:    Other closed displaced fracture of proximal end of left humerus with routine healing, subsequent encounter [S42.292D]   Referring Physician: Janie Lamb MD  PCP: Andrei Medina MD                             Plan of care signed (Y/N):     Date of Patient follow up with Physician:      Progress Report/POC: EVAL today  Progress note due:  1/3/2024 (OR 10 visits /OR [address], whichever is less)  POC update due:  3/1/2024     Latex Allergy:  [x]NO      []YES    Preferred Language for Healthcare:   [x]English       []other:    SUBJECTIVE EXAMINATION     Patient Report/Comments: see eval    OBJECTIVE EXAMINATION     Observation:     Test measurements: see eval     Test used Initial score  23   Pain Summary VAS 4-7    Functional questionnaire Quick DASH 77%    ROM             MMT              RESTRICTIONS/PRECAUTIONS:  PROM only until f/u with MD; NWB    Exercises/Interventions:     Therapeutic Ex (73654)/NMR re-education (69266)  Sets/reps/time resistance Notes/Cues   Towel squeeze 10     Scap squeeze 10     Elbow ROM 10  Flex/ext   Seated PROM

## 2023-12-04 NOTE — PLAN OF CARE
Spine    Participation Restrictions:    [x] Reduced participation in self care   [x] Reduced participation in home management    [x] Reduced participation in work activities   [x] Reduced participation in social activities   [x] Reduced participation in sports/recreation    Classification :    [] Signs/symptoms consistent with post-surgical status including decreased ROM, strength and function.    [] Signs/symptoms consistent with joint sprain/strain   [] Signs/symptoms consistent with shoulder impingement   [] Signs/symptoms consistent with shoulder/elbow/wrist tendinopathy   [] Signs/symptoms consistent with rotator cuff tear   [] Signs/symptoms consistent with labral tear   [] Signs/symptoms consistent with postural dysfunction   [] Signs/symptoms consistent with glenohumeral IR Deficit - <45 degrees   [] Signs/symptoms consistent with facet dysfunction of cervical/thoracic spine   [] Signs/symptoms consistent with pathology which may benefit from Dry needling       Barriers to/and or personal factors that will affect rehab potential:    [x] Age   [x] Sex   [x] Lack of Motivation   [x] Co-morbidities   [] Cognitive function, education/learning barriers   [] Environmental, home barriers   [] Profession/work barriers   [x] Past PT/medical experience   [] Justification:     Prognosis/Rehab Potential:  [] Excellent     [x] Good     [] Fair     [] Poor         Tolerance of evaluation/treatment:   [] Excellent     [x] Good     [] Fair     [] Poor    Physical Therapy Evaluation Complexity Justification  [x] A history of present problem and 3 or more personal factors and/or co-morbidities that impact the plan of care  [x] A total of 4 or more elements found upon examination of body systems using standardized tests and measures addressing any of the following: body structures, functions (impairments), activity limitations, and/or participation restrictions  [x] A clinical presentation with stable and/or uncomplicated

## 2023-12-07 ENCOUNTER — HOSPITAL ENCOUNTER (OUTPATIENT)
Dept: PHYSICAL THERAPY | Age: 78
Setting detail: THERAPIES SERIES
Discharge: HOME | End: 2023-12-07
Payer: MEDICARE

## 2023-12-07 PROCEDURE — 97110 THERAPEUTIC EXERCISES: CPT

## 2023-12-07 PROCEDURE — 97140 MANUAL THERAPY 1/> REGIONS: CPT

## 2023-12-07 PROCEDURE — 97016 VASOPNEUMATIC DEVICE THERAPY: CPT

## 2023-12-07 NOTE — FLOWSHEET NOTE
[address] [place] [address] and [facility], [address], [address] office: [phone] fax: [phone]    Physical Therapy: TREATMENT/PROGRESS NOTE   Patient: Sidra Beatty (81 y.o. female)   Treatment Date: 2023   :  1945 MRN: [id number]   Visit #: 2   Insurance Allowable Auth Needed   6 (until 24) [x]Yes    []No    Insurance: Payor: Geovanni Arreguin / Plan: Elier Maddox ESSENTIAL/PLUS / Product Type: *No Product type* /   Insurance ID: [id number] - (Medicare Managed)  Secondary Insurance (if applicable):    Treatment Diagnosis: L shoulder pain M25.512    ICD-10-CM    1. Other closed displaced fracture of proximal end of left humerus with routine healing, subsequent encounter  S42.292D       2. Chronic left shoulder pain  M25.512     G89.29          Medical Diagnosis:    Other closed displaced fracture of proximal end of left humerus with routine healing, subsequent encounter [S42.292D]   Referring Physician: Shanell James MD  PCP: Patel Herrera MD                             Plan of care signed (Y/N):     Date of Patient follow up with Physician:      Progress Report/POC: EVAL today  Progress note due:  2024 (OR 10 visits /OR [address], whichever is less)  POC update due:  3/6/2024     Latex Allergy:  [x]NO      []YES    Preferred Language for Healthcare:   [x]English       []other:    SUBJECTIVE EXAMINATION     Patient Report/Comments: Pt stated feels about the same. Stated some mild soreness following her routine.     OBJECTIVE EXAMINATION     Observation:     Test measurements: see eval     Test used Initial score  23   Pain Summary VAS 4-7    Functional questionnaire Quick DASH 77%    ROM             MMT              RESTRICTIONS/PRECAUTIONS:  PROM only until f/u with MD; NWB    Exercises/Interventions:     Therapeutic Ex (18533)/NMR re-education (08042)  Sets/reps/time resistance Notes/Cues

## 2023-12-11 ENCOUNTER — OFFICE VISIT (OUTPATIENT)
Dept: ORTHOPEDIC SURGERY | Age: 78
End: 2023-12-11
Payer: MEDICARE

## 2023-12-11 ENCOUNTER — HOSPITAL ENCOUNTER (OUTPATIENT)
Dept: PHYSICAL THERAPY | Age: 78
Setting detail: THERAPIES SERIES
Discharge: HOME | End: 2023-12-11
Payer: MEDICARE

## 2023-12-11 VITALS — WEIGHT: 135 LBS | BODY MASS INDEX: 21.19 KG/M2 | HEIGHT: 67 IN

## 2023-12-11 DIAGNOSIS — M25.512 LEFT SHOULDER PAIN, UNSPECIFIED CHRONICITY: ICD-10-CM

## 2023-12-11 DIAGNOSIS — S42.292D OTHER CLOSED DISPLACED FRACTURE OF PROXIMAL END OF LEFT HUMERUS WITH ROUTINE HEALING, SUBSEQUENT ENCOUNTER: Primary | ICD-10-CM

## 2023-12-11 PROCEDURE — 99213 OFFICE O/P EST LOW 20 MIN: CPT | Performed by: ORTHOPAEDIC SURGERY

## 2023-12-11 PROCEDURE — 97110 THERAPEUTIC EXERCISES: CPT

## 2023-12-11 PROCEDURE — 97140 MANUAL THERAPY 1/> REGIONS: CPT

## 2023-12-11 PROCEDURE — 1123F ACP DISCUSS/DSCN MKR DOCD: CPT | Performed by: ORTHOPAEDIC SURGERY

## 2023-12-11 NOTE — PROGRESS NOTES
FOLLOW UP ORTHOPAEDIC NOTE    The patient follows up today for reevaluation of left shoulder. The patient states 4/10 pain at the most but is at baseline 2/10 pain. She is accompanied by her family member. She has remained in her sling. She has been nonweightbearing. She has not had 3 physical therapy visits thus far. She states her pain is doing dramatically better    PE:  AAOx3  RR  Unlabored breathing  Skin warm and moist  Focused physical examination of the left shoulder  Nontender to palpation throughout left shoulder    Pertinent radiographs/imagin view left shoulder 2023: Healing left proximal humerus surgical neck fracture. Reduced glenohumeral joint. Callus appreciated     Diagnosis Orders   1. Left shoulder pain, unspecified chronicity  XR SHOULDER LEFT (MIN 2 VIEWS)        Assessment and plan: 78 female with continued subjective symptoms of left shoulder pain with known, correlating diagnosis of healing left proximal humerus fracture. -Time of 12 minutes was spent coordinating and discussing the clinical findings and diagnostic imaging results as they pertain to the patient's presenting subjective symptoms.  -I had a pleasant discussion with the patient and her family member. I reviewed with them both that currently her clinical examination she is doing better with less pain. She is working with physical therapy  -Discontinue sling at this time and 2 to 5 pound weight limit over the next 2 to 4 weeks.   I did advocate that she sleep in the sling for an additional 2 weeks  -Formal physical therapy will continue twice a week to work on full passive and active range of motion at this time to include pain modalities  -OTC Tylenol per bottle as needed discomfort  -All questions answered to the patient's satisfaction and the patient expressed understanding and agreement with the above listed treatment plan  -Follow up in 6 weeks time for 3-month postinjury visit with 4 view left shoulder

## 2023-12-11 NOTE — FLOWSHEET NOTE
[address] [place] [address] and Therapy [address], [address], [address] office: [phone] fax: [phone]    Physical Therapy: TREATMENT/PROGRESS NOTE   Patient: Juvenal Otero (89 y.o. female)   Treatment Date: 2023   :  1945 MRN: [id number]   Visit #: 3   Insurance Allowable Auth Needed   6 (until 24) [x]Yes    []No    Insurance: Payor: Geetha Hood / Plan: Kimberley Fragmin ESSENTIAL/PLUS / Product Type: *No Product type* /   Insurance ID: [id number] - (Medicare Managed)  Secondary Insurance (if applicable):    Treatment Diagnosis: L shoulder pain M25.512    ICD-10-CM    1. Other closed displaced fracture of proximal end of left humerus with routine healing, subsequent encounter  S42.292D       2. Chronic left shoulder pain  M25.512     G89.29          Medical Diagnosis:    Other closed displaced fracture of proximal end of left humerus with routine healing, subsequent encounter [S42.292D]   Referring Physician: Russell Juarez MD  PCP: Carlos Baxter MD                             Plan of care signed (Y/N):     Date of Patient follow up with Physician:      Progress Report/POC: EVAL today  Progress note due:  1/10/2024 (OR 10 visits /OR [address], whichever is less)  POC update due:  3/8/2024     Latex Allergy:  [x]NO      []YES    Preferred Language for Healthcare:   [x]English       []other:    SUBJECTIVE EXAMINATION     Patient Report/Comments: Pt reports feels okay just sore. Stated saw MD who stated she can wean out of sling.      OBJECTIVE EXAMINATION     Observation:     Test measurements: see eval     Test used Initial score  23   Pain Summary VAS 4-7    Functional questionnaire Quick DASH 77%    ROM             MMT              RESTRICTIONS/PRECAUTIONS:  PROM only until f/u with MD; NWB    Exercises/Interventions:     Therapeutic Ex (40150)/NMR re-education (38439)  Sets/reps/time resistance

## 2023-12-14 ENCOUNTER — HOSPITAL ENCOUNTER (OUTPATIENT)
Dept: PHYSICAL THERAPY | Age: 78
Setting detail: THERAPIES SERIES
Discharge: HOME | End: 2023-12-14
Payer: MEDICARE

## 2023-12-14 PROCEDURE — 97110 THERAPEUTIC EXERCISES: CPT

## 2023-12-14 PROCEDURE — 97140 MANUAL THERAPY 1/> REGIONS: CPT

## 2023-12-14 NOTE — FLOWSHEET NOTE
[address] [place] [address] and Therapy [address], [address], [address] office: [phone] fax: [phone]    Physical Therapy: TREATMENT/PROGRESS NOTE   Patient: Maryellen Cordova (56 y.o. female)   Treatment Date: 2023   :  1945 MRN: [id number]   Visit #: 4   Insurance Allowable Auth Needed   6 (until 24) [x]Yes    []No    Insurance: Payor: Mahsa Michelle / Plan: Nguyễn Cuenca ESSENTIAL/PLUS / Product Type: *No Product type* /   Insurance ID: [id number] - (Medicare Managed)  Secondary Insurance (if applicable):    Treatment Diagnosis: L shoulder pain M25.512    ICD-10-CM    1. Other closed displaced fracture of proximal end of left humerus with routine healing, subsequent encounter  S42.292D       2. Chronic left shoulder pain  M25.512     G89.29          Medical Diagnosis:    Other closed displaced fracture of proximal end of left humerus with routine healing, subsequent encounter [S42.292D]   Referring Physician: Jose Hood MD  PCP: Ja Monge MD                             Plan of care signed (Y/N):     Date of Patient follow up with Physician:      Progress Report/POC: EVAL today  Progress note due:  2024 (OR 10 visits /OR [address], whichever is less)  POC update due:  3/13/2024     Latex Allergy:  [x]NO      []YES    Preferred Language for Healthcare:   [x]English       []other:    SUBJECTIVE EXAMINATION     Patient Report/Comments: Pt reports feels a little bit better. Still feels sore.     OBJECTIVE EXAMINATION     Observation:     Test measurements: see eval     Test used Initial score  23   Pain Summary VAS 4-7    Functional questionnaire Quick DASH 77%    ROM             MMT              RESTRICTIONS/PRECAUTIONS:  PROM only until f/u with MD; NWB    Exercises/Interventions:     Therapeutic Ex (54786)/NMR re-education (03852)  Sets/reps/time resistance Notes/Cues   Towel squeeze/gripper

## 2024-01-29 ENCOUNTER — OFFICE VISIT (OUTPATIENT)
Dept: ORTHOPEDIC SURGERY | Age: 79
End: 2024-01-29
Payer: MEDICARE

## 2024-01-29 VITALS — WEIGHT: 135 LBS | HEIGHT: 67 IN | BODY MASS INDEX: 21.19 KG/M2

## 2024-01-29 DIAGNOSIS — S42.292D OTHER CLOSED DISPLACED FRACTURE OF PROXIMAL END OF LEFT HUMERUS WITH ROUTINE HEALING, SUBSEQUENT ENCOUNTER: Primary | ICD-10-CM

## 2024-01-29 PROCEDURE — 1123F ACP DISCUSS/DSCN MKR DOCD: CPT | Performed by: ORTHOPAEDIC SURGERY

## 2024-01-29 PROCEDURE — 99213 OFFICE O/P EST LOW 20 MIN: CPT | Performed by: ORTHOPAEDIC SURGERY

## 2024-01-29 NOTE — PROGRESS NOTES
FOLLOW UP ORTHOPAEDIC NOTE    The patient follows up today for reevaluation of left shoulder pain. The patient states 1/10 pain.  She is pleased with the results having gone to physical therapy thus far.  She states she is still smoking.     PE:  AAOx3  RR  Unlabored breathing  Skin warm and moist  Focused physical examination of the left shoulder  160 degrees forward flexion, 40 degrees external rotation internal rotation L5  Nontender to palpation throughout the left shoulder  Skin intact throughout  5/5 D B T G IO EPL  SILT Ax, R, U, M  +2 radial pulse    Pertinent radiographs/imagin view left shoulder 2024: Healed proximal humerus fracture, slight varus.  Aligned glenohumeral joint     Diagnosis Orders   1. Other closed displaced fracture of proximal end of left humerus with routine healing, subsequent encounter          Assessment and plan: 78 female with continued subjective symptoms of left shoulder pain with known, correlating diagnosis of left shoulder proximal humerus fracture-clinically and radiologically healed.  -Time of 12  Minutes was spent coordinating and discussing the clinical findings and diagnostic imaging results as they pertain to the patient's presenting subjective symptoms.  -I had a pleasant discussion with the patient today.  I reviewed with her that currently her clinical examination is well-appearing.  She has done quite well in terms of overall healing of her fracture.  She is pleased with the results as am I  -Continue activity modification to include low impact as symptoms require  -OTC Tylenol per bottle as needed discomfort  -Continue physical therapy program taught to her as a home exercise program  -All questions answered to the patient's satisfaction and the patient expressed understanding and agreement with the above listed treatment plan  -Follow up in as needed  -Thank you for the clinical consultation and allowing me to participate in the patient's

## 2024-05-20 ENCOUNTER — HOSPITAL ENCOUNTER (INPATIENT)
Age: 79
LOS: 14 days | Discharge: NURSING, REHAB OR LONG-TERM CARE | DRG: 689 | End: 2024-06-03
Attending: EMERGENCY MEDICINE | Admitting: STUDENT IN AN ORGANIZED HEALTH CARE EDUCATION/TRAINING PROGRAM
Payer: MEDICARE

## 2024-05-20 ENCOUNTER — APPOINTMENT (OUTPATIENT)
Dept: GENERAL RADIOLOGY | Age: 79
DRG: 689 | End: 2024-05-20
Payer: MEDICARE

## 2024-05-20 ENCOUNTER — APPOINTMENT (OUTPATIENT)
Dept: CT IMAGING | Age: 79
DRG: 689 | End: 2024-05-20
Attending: EMERGENCY MEDICINE
Payer: MEDICARE

## 2024-05-20 ENCOUNTER — APPOINTMENT (OUTPATIENT)
Dept: CT IMAGING | Age: 79
DRG: 689 | End: 2024-05-20
Payer: MEDICARE

## 2024-05-20 DIAGNOSIS — S31.809A WOUND OF BUTTOCK, UNSPECIFIED LATERALITY, INITIAL ENCOUNTER: ICD-10-CM

## 2024-05-20 DIAGNOSIS — I50.9 CONGESTIVE HEART FAILURE, UNSPECIFIED HF CHRONICITY, UNSPECIFIED HEART FAILURE TYPE (HCC): ICD-10-CM

## 2024-05-20 DIAGNOSIS — E83.42 HYPOMAGNESEMIA: ICD-10-CM

## 2024-05-20 DIAGNOSIS — R41.82 ALTERED MENTAL STATUS, UNSPECIFIED ALTERED MENTAL STATUS TYPE: Primary | ICD-10-CM

## 2024-05-20 DIAGNOSIS — R10.84 GENERALIZED ABDOMINAL PAIN: ICD-10-CM

## 2024-05-20 DIAGNOSIS — R47.81 SLURRED SPEECH: ICD-10-CM

## 2024-05-20 DIAGNOSIS — F41.9 ANXIETY: ICD-10-CM

## 2024-05-20 DIAGNOSIS — E83.51 HYPOCALCEMIA: ICD-10-CM

## 2024-05-20 DIAGNOSIS — R94.31 PROLONGED Q-T INTERVAL ON ECG: ICD-10-CM

## 2024-05-20 DIAGNOSIS — E87.6 HYPOKALEMIA: ICD-10-CM

## 2024-05-20 DIAGNOSIS — N30.01 ACUTE CYSTITIS WITH HEMATURIA: ICD-10-CM

## 2024-05-20 PROBLEM — N39.0 UTI (URINARY TRACT INFECTION): Status: ACTIVE | Noted: 2024-05-20

## 2024-05-20 LAB
ALBUMIN SERPL-MCNC: 3.9 G/DL (ref 3.4–5)
ALBUMIN/GLOB SERPL: 1.3 {RATIO} (ref 1.1–2.2)
ALP SERPL-CCNC: 78 U/L (ref 40–129)
ALT SERPL-CCNC: 18 U/L (ref 10–40)
ANION GAP SERPL CALCULATED.3IONS-SCNC: 23 MMOL/L (ref 3–16)
AST SERPL-CCNC: 30 U/L (ref 15–37)
BACTERIA URNS QL MICRO: ABNORMAL /HPF
BASE EXCESS BLDV CALC-SCNC: -1.4 MMOL/L (ref -3–3)
BASOPHILS # BLD: 0 K/UL (ref 0–0.2)
BASOPHILS NFR BLD: 0.1 %
BILIRUB DIRECT SERPL-MCNC: <0.2 MG/DL (ref 0–0.3)
BILIRUB INDIRECT SERPL-MCNC: NORMAL MG/DL (ref 0–1)
BILIRUB SERPL-MCNC: 0.3 MG/DL (ref 0–1)
BILIRUB UR QL STRIP.AUTO: NEGATIVE
BUN SERPL-MCNC: 8 MG/DL (ref 7–20)
CA-I BLD-SCNC: 0.63 MMOL/L (ref 1.12–1.32)
CALCIUM SERPL-MCNC: 5.6 MG/DL (ref 8.3–10.6)
CHLORIDE SERPL-SCNC: 96 MMOL/L (ref 99–110)
CLARITY UR: ABNORMAL
CO2 BLDV-SCNC: 22 MMOL/L
CO2 SERPL-SCNC: 21 MMOL/L (ref 21–32)
COHGB MFR BLDV: 1.9 % (ref 0–1.5)
COLOR UR: YELLOW
CREAT SERPL-MCNC: 0.9 MG/DL (ref 0.6–1.2)
DEPRECATED RDW RBC AUTO: 18 % (ref 12.4–15.4)
EOSINOPHIL # BLD: 0 K/UL (ref 0–0.6)
EOSINOPHIL NFR BLD: 0.2 %
EPI CELLS #/AREA URNS HPF: ABNORMAL /HPF (ref 0–5)
GFR SERPLBLD CREATININE-BSD FMLA CKD-EPI: 64 ML/MIN/{1.73_M2}
GLUCOSE SERPL-MCNC: 168 MG/DL (ref 70–99)
GLUCOSE UR STRIP.AUTO-MCNC: NEGATIVE MG/DL
HCO3 BLDV-SCNC: 21.1 MMOL/L (ref 23–29)
HCT VFR BLD AUTO: 30.1 % (ref 36–48)
HGB BLD-MCNC: 9.7 G/DL (ref 12–16)
HGB UR QL STRIP.AUTO: ABNORMAL
KETONES UR STRIP.AUTO-MCNC: >=80 MG/DL
LEUKOCYTE ESTERASE UR QL STRIP.AUTO: NEGATIVE
LYMPHOCYTES # BLD: 0.7 K/UL (ref 1–5.1)
LYMPHOCYTES NFR BLD: 7.5 %
MAGNESIUM SERPL-MCNC: 0.8 MG/DL (ref 1.8–2.4)
MCH RBC QN AUTO: 24.8 PG (ref 26–34)
MCHC RBC AUTO-ENTMCNC: 32.3 G/DL (ref 31–36)
MCV RBC AUTO: 76.9 FL (ref 80–100)
METHGB MFR BLDV: 0.1 %
MONOCYTES # BLD: 0.5 K/UL (ref 0–1.3)
MONOCYTES NFR BLD: 5 %
NEUTROPHILS # BLD: 8.6 K/UL (ref 1.7–7.7)
NEUTROPHILS NFR BLD: 87.2 %
NITRITE UR QL STRIP.AUTO: POSITIVE
NT-PROBNP SERPL-MCNC: 4135 PG/ML (ref 0–449)
O2 THERAPY: ABNORMAL
PCO2 BLDV: 28 MMHG (ref 40–50)
PH BLDV: 7.48 [PH] (ref 7.35–7.45)
PH BLDV: 7.49 [PH] (ref 7.35–7.45)
PH UR STRIP.AUTO: 6 [PH] (ref 5–8)
PLATELET # BLD AUTO: 256 K/UL (ref 135–450)
PMV BLD AUTO: 8.3 FL (ref 5–10.5)
PO2 BLDV: 87.3 MMHG (ref 25–40)
POTASSIUM SERPL-SCNC: 2.6 MMOL/L (ref 3.5–5.1)
PROT SERPL-MCNC: 6.8 G/DL (ref 6.4–8.2)
PROT UR STRIP.AUTO-MCNC: 30 MG/DL
RBC # BLD AUTO: 3.91 M/UL (ref 4–5.2)
RBC #/AREA URNS HPF: ABNORMAL /HPF (ref 0–4)
SAO2 % BLDV: 97 %
SODIUM SERPL-SCNC: 140 MMOL/L (ref 136–145)
SP GR UR STRIP.AUTO: 1.02 (ref 1–1.03)
TROPONIN, HIGH SENSITIVITY: 72 NG/L (ref 0–14)
TROPONIN, HIGH SENSITIVITY: 77 NG/L (ref 0–14)
UA COMPLETE W REFLEX CULTURE PNL UR: YES
UA DIPSTICK W REFLEX MICRO PNL UR: YES
URN SPEC COLLECT METH UR: ABNORMAL
UROBILINOGEN UR STRIP-ACNC: 0.2 E.U./DL
WBC # BLD AUTO: 9.9 K/UL (ref 4–11)
WBC #/AREA URNS HPF: ABNORMAL /HPF (ref 0–5)

## 2024-05-20 PROCEDURE — 83880 ASSAY OF NATRIURETIC PEPTIDE: CPT

## 2024-05-20 PROCEDURE — 87086 URINE CULTURE/COLONY COUNT: CPT

## 2024-05-20 PROCEDURE — 2500000003 HC RX 250 WO HCPCS: Performed by: EMERGENCY MEDICINE

## 2024-05-20 PROCEDURE — 85025 COMPLETE CBC W/AUTO DIFF WBC: CPT

## 2024-05-20 PROCEDURE — 81001 URINALYSIS AUTO W/SCOPE: CPT

## 2024-05-20 PROCEDURE — 83735 ASSAY OF MAGNESIUM: CPT

## 2024-05-20 PROCEDURE — 99285 EMERGENCY DEPT VISIT HI MDM: CPT

## 2024-05-20 PROCEDURE — 6360000002 HC RX W HCPCS: Performed by: STUDENT IN AN ORGANIZED HEALTH CARE EDUCATION/TRAINING PROGRAM

## 2024-05-20 PROCEDURE — 6370000000 HC RX 637 (ALT 250 FOR IP): Performed by: STUDENT IN AN ORGANIZED HEALTH CARE EDUCATION/TRAINING PROGRAM

## 2024-05-20 PROCEDURE — 93005 ELECTROCARDIOGRAM TRACING: CPT | Performed by: STUDENT IN AN ORGANIZED HEALTH CARE EDUCATION/TRAINING PROGRAM

## 2024-05-20 PROCEDURE — 70450 CT HEAD/BRAIN W/O DYE: CPT

## 2024-05-20 PROCEDURE — 74177 CT ABD & PELVIS W/CONTRAST: CPT

## 2024-05-20 PROCEDURE — 80053 COMPREHEN METABOLIC PANEL: CPT

## 2024-05-20 PROCEDURE — 1200000000 HC SEMI PRIVATE

## 2024-05-20 PROCEDURE — 96375 TX/PRO/DX INJ NEW DRUG ADDON: CPT

## 2024-05-20 PROCEDURE — 87088 URINE BACTERIA CULTURE: CPT

## 2024-05-20 PROCEDURE — 6360000002 HC RX W HCPCS: Performed by: EMERGENCY MEDICINE

## 2024-05-20 PROCEDURE — 82330 ASSAY OF CALCIUM: CPT

## 2024-05-20 PROCEDURE — 82803 BLOOD GASES ANY COMBINATION: CPT

## 2024-05-20 PROCEDURE — 87186 SC STD MICRODIL/AGAR DIL: CPT

## 2024-05-20 PROCEDURE — 84484 ASSAY OF TROPONIN QUANT: CPT

## 2024-05-20 PROCEDURE — 96365 THER/PROPH/DIAG IV INF INIT: CPT

## 2024-05-20 PROCEDURE — 71046 X-RAY EXAM CHEST 2 VIEWS: CPT

## 2024-05-20 PROCEDURE — 2580000003 HC RX 258: Performed by: EMERGENCY MEDICINE

## 2024-05-20 PROCEDURE — 6360000004 HC RX CONTRAST MEDICATION: Performed by: EMERGENCY MEDICINE

## 2024-05-20 RX ORDER — MAGNESIUM SULFATE IN WATER 40 MG/ML
4000 INJECTION, SOLUTION INTRAVENOUS ONCE
Status: COMPLETED | OUTPATIENT
Start: 2024-05-20 | End: 2024-05-21

## 2024-05-20 RX ORDER — LORAZEPAM 2 MG/ML
0.5 INJECTION INTRAMUSCULAR ONCE
Status: COMPLETED | OUTPATIENT
Start: 2024-05-20 | End: 2024-05-20

## 2024-05-20 RX ORDER — SODIUM CHLORIDE 9 MG/ML
INJECTION, SOLUTION INTRAVENOUS PRN
Status: DISCONTINUED | OUTPATIENT
Start: 2024-05-20 | End: 2024-06-03 | Stop reason: HOSPADM

## 2024-05-20 RX ORDER — POTASSIUM CHLORIDE 7.45 MG/ML
10 INJECTION INTRAVENOUS PRN
Status: DISCONTINUED | OUTPATIENT
Start: 2024-05-20 | End: 2024-06-03 | Stop reason: HOSPADM

## 2024-05-20 RX ORDER — POLYETHYLENE GLYCOL 3350 17 G/17G
17 POWDER, FOR SOLUTION ORAL DAILY PRN
Status: DISCONTINUED | OUTPATIENT
Start: 2024-05-20 | End: 2024-06-03 | Stop reason: HOSPADM

## 2024-05-20 RX ORDER — SODIUM CHLORIDE 0.9 % (FLUSH) 0.9 %
5-40 SYRINGE (ML) INJECTION PRN
Status: DISCONTINUED | OUTPATIENT
Start: 2024-05-20 | End: 2024-06-03 | Stop reason: HOSPADM

## 2024-05-20 RX ORDER — CALCIUM GLUCONATE 20 MG/ML
2000 INJECTION, SOLUTION INTRAVENOUS ONCE
Status: COMPLETED | OUTPATIENT
Start: 2024-05-20 | End: 2024-05-20

## 2024-05-20 RX ORDER — MAGNESIUM SULFATE IN WATER 40 MG/ML
2000 INJECTION, SOLUTION INTRAVENOUS PRN
Status: DISCONTINUED | OUTPATIENT
Start: 2024-05-20 | End: 2024-06-03 | Stop reason: HOSPADM

## 2024-05-20 RX ORDER — ONDANSETRON 4 MG/1
4 TABLET, ORALLY DISINTEGRATING ORAL EVERY 8 HOURS PRN
Status: DISCONTINUED | OUTPATIENT
Start: 2024-05-20 | End: 2024-06-03 | Stop reason: HOSPADM

## 2024-05-20 RX ORDER — ONDANSETRON 2 MG/ML
4 INJECTION INTRAMUSCULAR; INTRAVENOUS EVERY 6 HOURS PRN
Status: DISCONTINUED | OUTPATIENT
Start: 2024-05-20 | End: 2024-06-03 | Stop reason: HOSPADM

## 2024-05-20 RX ORDER — SODIUM CHLORIDE 0.9 % (FLUSH) 0.9 %
5-40 SYRINGE (ML) INJECTION EVERY 12 HOURS SCHEDULED
Status: DISCONTINUED | OUTPATIENT
Start: 2024-05-20 | End: 2024-06-03 | Stop reason: HOSPADM

## 2024-05-20 RX ORDER — ENOXAPARIN SODIUM 100 MG/ML
40 INJECTION SUBCUTANEOUS DAILY
Status: DISCONTINUED | OUTPATIENT
Start: 2024-05-20 | End: 2024-06-03 | Stop reason: HOSPADM

## 2024-05-20 RX ORDER — ACETAMINOPHEN 650 MG/1
650 SUPPOSITORY RECTAL EVERY 6 HOURS PRN
Status: DISCONTINUED | OUTPATIENT
Start: 2024-05-20 | End: 2024-06-03 | Stop reason: HOSPADM

## 2024-05-20 RX ORDER — POTASSIUM CHLORIDE 20 MEQ/1
40 TABLET, EXTENDED RELEASE ORAL PRN
Status: DISCONTINUED | OUTPATIENT
Start: 2024-05-20 | End: 2024-06-03 | Stop reason: HOSPADM

## 2024-05-20 RX ORDER — ACETAMINOPHEN 325 MG/1
650 TABLET ORAL EVERY 6 HOURS PRN
Status: DISCONTINUED | OUTPATIENT
Start: 2024-05-20 | End: 2024-06-03 | Stop reason: HOSPADM

## 2024-05-20 RX ORDER — POTASSIUM CHLORIDE 7.45 MG/ML
10 INJECTION INTRAVENOUS
Status: COMPLETED | OUTPATIENT
Start: 2024-05-20 | End: 2024-05-20

## 2024-05-20 RX ADMIN — ACETAMINOPHEN 650 MG: 325 TABLET ORAL at 21:35

## 2024-05-20 RX ADMIN — ENOXAPARIN SODIUM 40 MG: 100 INJECTION SUBCUTANEOUS at 21:28

## 2024-05-20 RX ADMIN — POTASSIUM CHLORIDE 10 MEQ: 7.46 INJECTION, SOLUTION INTRAVENOUS at 21:27

## 2024-05-20 RX ADMIN — MAGNESIUM SULFATE HEPTAHYDRATE 4000 MG: 40 INJECTION, SOLUTION INTRAVENOUS at 20:07

## 2024-05-20 RX ADMIN — POTASSIUM CHLORIDE 10 MEQ: 7.46 INJECTION, SOLUTION INTRAVENOUS at 19:13

## 2024-05-20 RX ADMIN — CEFTRIAXONE SODIUM 1000 MG: 1 INJECTION, POWDER, FOR SOLUTION INTRAMUSCULAR; INTRAVENOUS at 17:25

## 2024-05-20 RX ADMIN — LORAZEPAM 0.5 MG: 2 INJECTION INTRAMUSCULAR; INTRAVENOUS at 17:19

## 2024-05-20 RX ADMIN — POTASSIUM CHLORIDE 10 MEQ: 7.46 INJECTION, SOLUTION INTRAVENOUS at 18:04

## 2024-05-20 RX ADMIN — CALCIUM GLUCONATE 2000 MG: 20 INJECTION, SOLUTION INTRAVENOUS at 18:02

## 2024-05-20 RX ADMIN — IOPAMIDOL 75 ML: 755 INJECTION, SOLUTION INTRAVENOUS at 16:28

## 2024-05-20 RX ADMIN — POTASSIUM CHLORIDE 10 MEQ: 7.46 INJECTION, SOLUTION INTRAVENOUS at 21:23

## 2024-05-20 ASSESSMENT — PAIN DESCRIPTION - ORIENTATION
ORIENTATION: MID
ORIENTATION: MID

## 2024-05-20 ASSESSMENT — PAIN - FUNCTIONAL ASSESSMENT: PAIN_FUNCTIONAL_ASSESSMENT: 0-10

## 2024-05-20 ASSESSMENT — PAIN DESCRIPTION - LOCATION
LOCATION: BACK
LOCATION: BACK
LOCATION: CHEST

## 2024-05-20 ASSESSMENT — PAIN DESCRIPTION - DESCRIPTORS
DESCRIPTORS: SHARP
DESCRIPTORS: ACHING;SHARP

## 2024-05-20 ASSESSMENT — PAIN SCALES - GENERAL
PAINLEVEL_OUTOF10: 8
PAINLEVEL_OUTOF10: 2
PAINLEVEL_OUTOF10: 5

## 2024-05-20 ASSESSMENT — PAIN SCALES - WONG BAKER: WONGBAKER_NUMERICALRESPONSE: HURTS A LITTLE BIT

## 2024-05-20 ASSESSMENT — PAIN DESCRIPTION - PAIN TYPE: TYPE: ACUTE PAIN

## 2024-05-20 ASSESSMENT — PAIN DESCRIPTION - FREQUENCY: FREQUENCY: CONTINUOUS

## 2024-05-20 NOTE — ED NOTES
Pt's depends changed and chux placed underneath pt.  Pt straight catheterized for urine by Noelle technician and assisted by this RN.  Purewick put into place and attached to suction.

## 2024-05-20 NOTE — H&P
Hospital Medicine History & Physical      Date of Admission: 5/20/2024    Date of Service:  Pt seen/examined on 05/20/24     [x]Admitted to Inpatient with expected LOS greater than two midnights due to medical therapy.  []Placed in Observation status.    Chief Admission Complaint:  Altered mental status    Presenting Admission History:      79 y.o. female who presented to [facility] with altered mental status.  PMHx significant for DM, HLD.      Patient unable to answer questions at this time. No family at bedside.     While in ED patient was found to be afebrile with a temp of 99.3F. Respiratory rate 16 sating at 94% on RA. /96. HR 84. K 2.6. Anion gap 23. Mag 0.80. Calcium 5.6. BNP 4135. Inital trop 72 with repeat of 77. Liver panel unremarable. Hgb 9.7, MCV 76.9.  UA showed UTI, with culture pending. CT-head unremarkable. CT-abd/pelvis showed cystitis. CXR showed possible PNA vs atelectasis. Patient was given calcium gluconate, rocephin, ativan, K, and mag while in the ED. It was at this time patient would be admitted to hospital service for further evaluation and treatment.     Assessment/Plan:      Current Principal Problem:  UTI (urinary tract infection)      UTI - admission U/A c/w acute cystitis.  Infection evidenced by U/A, Cx results and/or signs/sxs of clinical infection despite Cx results.  Started on empiric abx on rocephin pending Cx results.  Changed to DAILY dosing.    Encephalopathy - acute metabolic, 2nd to above.  Will continue to follow clinical response w/ supportive care PRN.     Anemia - etiology clinically unable to determine, w/out evidence of active bleeding/hemolysis. Asymptomatic w/out indication for transfusion. Follow serial labs - documented and reviewed.     HypoKalemia - etiology clinically unable to determine.  Followed serial labs and replaced PRN.  Documented and reviewed.    HypoMagnesemia - etiology clinically unable to determine.  Followed serial labs and replaced

## 2024-05-20 NOTE — ED PROVIDER NOTES
Emergency Department Attending Physician Note  Location: [facility]  ED  5/20/2024       Pt Name: Shelley Maurer  MRN: [id number]  Birthdate 1945    Date of evaluation: 5/20/2024  Provider: ABDELRAHMAN MOCK DO  PCP: Christina Montague MD    Note Started: 3:00 PM EDT 5/20/24    CHIEF COMPLAINT  Chief Complaint   Patient presents with    Altered Mental Status     Pt to ED from home via EMS with CC of AMS.  EMS states that a home health aid said last known normal was 1 week ago.  Pt oriented x2.       ROOM: 10    HISTORY OF PRESENT ILLNESS:  History obtained by patient. Limitations to history : None.     Shelley Maurer is a 79 y.o. female with a significant PMHx of Meredith's esophagus, diabetes, depression, hypertension, and other comorbidities as listed below, presenting emergency department today with 1 week of altered mental status.  History is very limited, but per EMS, a home health care nurse was at patient's house today, and called 911, for concerns of altered mental status.  It is unclear patient's baseline, and the phone number we try to contact her son that helps take care of her, Dwayne, is not in service.  On arrival to ER, patient says \"help me,\" and tells me that she is having some chest pain.  She denies any falls.  Noticed slight slurred speech here in the ER, but only intermittently, and her mouth is quite dry.  Unknown falls, although no obvious signs of trauma on thorough exam as below.  Was covered in stool on arrival, and is soiled briefs, and has stage I wound as below.  Temperature 99.3 here.  Otherwise, no acute clinical cardiopulmonary distress.  History is very limited.  Medical resident here is trying to contact family.    3:24 PM EDT  Dwayne is present in ED; started with grogginess and confusion yesterday. Normal alert and oriented. No falls or trauma. No cardiac history.  Says that patient \"basically lives on [facility],\" once I discussed the electrolyte abnormalities  before full 10 seconds: does not hit bed   6a. motor left le=Some effort against gravity, limb cannot get to or maintain (if cured) 90 (or 45) degrees, drifts down to bed, but has some effort against gravity   6b  Motor right le=Some effort against gravity, limb cannot get to or maintain (if cured) 90 (or 45) degrees, drifts down to bed, but has some effort against gravity   7. Limb Ataxia: 0=Absent -- unable to assess   8.  Sensory: 0=Normal; no sensory loss   9. Best Language:  1=Mild to moderate aphasia; some obvious loss of fluency or facility of comprehension without significant limitation on ideas expressed or form of expression.   10. Dysarthria: 1=Mild to moderate, patient slurs at least some words and at worst, can be understood with some difficulty   11. Extinction and Inattention: 0=No abnormality   12. Distal motor function: 0=Normal    Total:  10           DIAGNOSTIC RESULTS:  LABS:    Labs Reviewed   CBC WITH AUTO DIFFERENTIAL - Abnormal; Notable for the following components:       Result Value    RBC 3.91 (*)     Hemoglobin 9.7 (*)     Hematocrit 30.1 (*)     MCV 76.9 (*)     MCH 24.8 (*)     RDW 18.0 (*)     Neutrophils Absolute 8.6 (*)     Lymphocytes Absolute 0.7 (*)     All other components within normal limits   COMPREHENSIVE METABOLIC PANEL W/ REFLEX TO MG FOR LOW K - Abnormal; Notable for the following components:    Potassium reflex Magnesium 2.6 (*)     Chloride 96 (*)     Anion Gap 23 (*)     Glucose 168 (*)     Calcium 5.6 (*)     All other components within normal limits    Narrative:     CALL  [facility]  [facility] tel. [phone],  Chemistry results called to and read back by nenita baeza rn, 2024  16:02, by ABBY   BLOOD GAS, VENOUS - Abnormal; Notable for the following components:    pH, Jerry 7.494 (*)     pCO2, Jerry 28.0 (*)     pO2, Jerry 87.3 (*)     HCO3, Venous 21.1 (*)     Carboxyhemoglobin 1.9 (*)     All other components within normal limits   TROPONIN - Abnormal; Notable for  for 2 and half hours.  She has been mostly redirectable, but family is asking for something to help \"maybe relax her sleep a little bit.\"  Will give 0.5 mg Ativan.     As this patient requires further evaluation and management as above, this patient will be admitted to the hospital for subsequent care. I spoke with Dr. Villafuerte who accepts patient for admission. They are available to place admission orders.           CONSULTS:   Hospitalist for admission.         Is this patient to be included in the SEP-1 Core Measure due to severe sepsis or septic shock?   No   Exclusion criteria - the patient is NOT to be included for SEP-1 Core Measure due to:  2+ SIRS criteria are not met      CLINICAL IMPRESSION:     ICD-10-CM    1. Altered mental status, unspecified altered mental status type  R41.82       2. Generalized abdominal pain  R10.84       3. Wound of buttock, unspecified laterality, initial encounter  S31.809A       4. Hypokalemia  E87.6       5. Hypocalcemia  E83.51       6. Prolonged Q-T interval on ECG  R94.31       7. Acute cystitis with hematuria  N30.01       8. Hypomagnesemia  E83.42       9. Slurred speech  R47.81     LKW 5/17/2024            DISPOSITION:  I discussed the results, including any incidental findings, with patient and family member patient and son and through shared decision making;   Disposition today from the ED will be: Admit to CCU/ICU in guarded condition.   Questions answered. They are agreeable to plan and express understanding of plan.     Social Determinants : None      CRITICAL CARE:   Total critical care time is 55 minutes, which excludes separately billable procedures and updating family. Time spent is specifically for management of the presenting complaint and symptoms initially, direct bedside care, reevaluation, review of records, and consultation.  There was a high probability of clinically significant life-threatening deterioration in the patient's condition, which required

## 2024-05-21 LAB
ALBUMIN SERPL-MCNC: 3.8 G/DL (ref 3.4–5)
ALBUMIN/GLOB SERPL: 1.2 {RATIO} (ref 1.1–2.2)
ALP SERPL-CCNC: 77 U/L (ref 40–129)
ALT SERPL-CCNC: 16 U/L (ref 10–40)
ANION GAP SERPL CALCULATED.3IONS-SCNC: 21 MMOL/L (ref 3–16)
AST SERPL-CCNC: 24 U/L (ref 15–37)
BASOPHILS # BLD: 0 K/UL (ref 0–0.2)
BASOPHILS NFR BLD: 0.3 %
BILIRUB SERPL-MCNC: 0.3 MG/DL (ref 0–1)
BUN SERPL-MCNC: 5 MG/DL (ref 7–20)
CALCIUM SERPL-MCNC: 6.5 MG/DL (ref 8.3–10.6)
CHLORIDE SERPL-SCNC: 100 MMOL/L (ref 99–110)
CO2 SERPL-SCNC: 17 MMOL/L (ref 21–32)
CREAT SERPL-MCNC: 0.9 MG/DL (ref 0.6–1.2)
DEPRECATED RDW RBC AUTO: 17.8 % (ref 12.4–15.4)
EOSINOPHIL # BLD: 0 K/UL (ref 0–0.6)
EOSINOPHIL NFR BLD: 0.4 %
GFR SERPLBLD CREATININE-BSD FMLA CKD-EPI: 64 ML/MIN/{1.73_M2}
GLUCOSE SERPL-MCNC: 138 MG/DL (ref 70–99)
HCT VFR BLD AUTO: 30.9 % (ref 36–48)
HGB BLD-MCNC: 9.8 G/DL (ref 12–16)
LYMPHOCYTES # BLD: 0.9 K/UL (ref 1–5.1)
LYMPHOCYTES NFR BLD: 11.9 %
MAGNESIUM SERPL-MCNC: 2.2 MG/DL (ref 1.8–2.4)
MAGNESIUM SERPL-MCNC: 2.7 MG/DL (ref 1.8–2.4)
MCH RBC QN AUTO: 24.7 PG (ref 26–34)
MCHC RBC AUTO-ENTMCNC: 31.6 G/DL (ref 31–36)
MCV RBC AUTO: 78.2 FL (ref 80–100)
MONOCYTES # BLD: 0.5 K/UL (ref 0–1.3)
MONOCYTES NFR BLD: 6.7 %
NEUTROPHILS # BLD: 6.2 K/UL (ref 1.7–7.7)
NEUTROPHILS NFR BLD: 80.7 %
PLATELET # BLD AUTO: 229 K/UL (ref 135–450)
PMV BLD AUTO: 8.3 FL (ref 5–10.5)
POTASSIUM SERPL-SCNC: 2.7 MMOL/L (ref 3.5–5.1)
POTASSIUM SERPL-SCNC: 3.2 MMOL/L (ref 3.5–5.1)
PREALB SERPL-MCNC: 11.9 MG/DL (ref 20–40)
PROT SERPL-MCNC: 6.9 G/DL (ref 6.4–8.2)
RBC # BLD AUTO: 3.95 M/UL (ref 4–5.2)
SODIUM SERPL-SCNC: 138 MMOL/L (ref 136–145)
WBC # BLD AUTO: 7.7 K/UL (ref 4–11)

## 2024-05-21 PROCEDURE — 83735 ASSAY OF MAGNESIUM: CPT

## 2024-05-21 PROCEDURE — 2580000003 HC RX 258: Performed by: STUDENT IN AN ORGANIZED HEALTH CARE EDUCATION/TRAINING PROGRAM

## 2024-05-21 PROCEDURE — 1200000000 HC SEMI PRIVATE

## 2024-05-21 PROCEDURE — 84134 ASSAY OF PREALBUMIN: CPT

## 2024-05-21 PROCEDURE — 97162 PT EVAL MOD COMPLEX 30 MIN: CPT

## 2024-05-21 PROCEDURE — 97535 SELF CARE MNGMENT TRAINING: CPT

## 2024-05-21 PROCEDURE — 97166 OT EVAL MOD COMPLEX 45 MIN: CPT

## 2024-05-21 PROCEDURE — 2500000003 HC RX 250 WO HCPCS: Performed by: STUDENT IN AN ORGANIZED HEALTH CARE EDUCATION/TRAINING PROGRAM

## 2024-05-21 PROCEDURE — 6360000002 HC RX W HCPCS: Performed by: STUDENT IN AN ORGANIZED HEALTH CARE EDUCATION/TRAINING PROGRAM

## 2024-05-21 PROCEDURE — 92610 EVALUATE SWALLOWING FUNCTION: CPT

## 2024-05-21 PROCEDURE — 36415 COLL VENOUS BLD VENIPUNCTURE: CPT

## 2024-05-21 PROCEDURE — 97116 GAIT TRAINING THERAPY: CPT

## 2024-05-21 PROCEDURE — 85025 COMPLETE CBC W/AUTO DIFF WBC: CPT

## 2024-05-21 PROCEDURE — 84132 ASSAY OF SERUM POTASSIUM: CPT

## 2024-05-21 PROCEDURE — 80053 COMPREHEN METABOLIC PANEL: CPT

## 2024-05-21 PROCEDURE — 6370000000 HC RX 637 (ALT 250 FOR IP): Performed by: STUDENT IN AN ORGANIZED HEALTH CARE EDUCATION/TRAINING PROGRAM

## 2024-05-21 PROCEDURE — 92523 SPEECH SOUND LANG COMPREHEN: CPT

## 2024-05-21 RX ORDER — METOPROLOL SUCCINATE 50 MG/1
50 TABLET, EXTENDED RELEASE ORAL DAILY
Status: DISCONTINUED | OUTPATIENT
Start: 2024-05-21 | End: 2024-05-24

## 2024-05-21 RX ORDER — OXYCODONE HYDROCHLORIDE 5 MG/1
10 TABLET ORAL EVERY 4 HOURS PRN
Status: DISCONTINUED | OUTPATIENT
Start: 2024-05-21 | End: 2024-05-29

## 2024-05-21 RX ORDER — OXYCODONE HYDROCHLORIDE 5 MG/1
5 TABLET ORAL EVERY 4 HOURS PRN
Status: DISCONTINUED | OUTPATIENT
Start: 2024-05-21 | End: 2024-05-29

## 2024-05-21 RX ORDER — LORAZEPAM 0.5 MG/1
0.5 TABLET ORAL DAILY PRN
Status: DISCONTINUED | OUTPATIENT
Start: 2024-05-21 | End: 2024-05-29

## 2024-05-21 RX ORDER — ATORVASTATIN CALCIUM 40 MG/1
40 TABLET, FILM COATED ORAL DAILY
Status: DISCONTINUED | OUTPATIENT
Start: 2024-05-21 | End: 2024-06-03 | Stop reason: HOSPADM

## 2024-05-21 RX ORDER — CALCIUM GLUCONATE 20 MG/ML
2000 INJECTION, SOLUTION INTRAVENOUS ONCE
Status: COMPLETED | OUTPATIENT
Start: 2024-05-21 | End: 2024-05-21

## 2024-05-21 RX ORDER — FLUTICASONE PROPIONATE 50 MCG
2 SPRAY, SUSPENSION (ML) NASAL DAILY
Status: DISCONTINUED | OUTPATIENT
Start: 2024-05-21 | End: 2024-06-03 | Stop reason: HOSPADM

## 2024-05-21 RX ORDER — PANTOPRAZOLE SODIUM 40 MG/1
40 TABLET, DELAYED RELEASE ORAL
Status: DISCONTINUED | OUTPATIENT
Start: 2024-05-22 | End: 2024-06-03 | Stop reason: HOSPADM

## 2024-05-21 RX ORDER — LIDOCAINE 4 G/G
1 PATCH TOPICAL DAILY
Status: DISCONTINUED | OUTPATIENT
Start: 2024-05-21 | End: 2024-06-03 | Stop reason: HOSPADM

## 2024-05-21 RX ORDER — OXYBUTYNIN CHLORIDE 5 MG/1
15 TABLET, EXTENDED RELEASE ORAL DAILY
Status: DISCONTINUED | OUTPATIENT
Start: 2024-05-21 | End: 2024-06-03 | Stop reason: HOSPADM

## 2024-05-21 RX ADMIN — OXYCODONE 5 MG: 5 TABLET ORAL at 12:37

## 2024-05-21 RX ADMIN — ATORVASTATIN CALCIUM 40 MG: 40 TABLET, FILM COATED ORAL at 10:31

## 2024-05-21 RX ADMIN — ENOXAPARIN SODIUM 40 MG: 100 INJECTION SUBCUTANEOUS at 09:04

## 2024-05-21 RX ADMIN — METOPROLOL SUCCINATE 50 MG: 50 TABLET, EXTENDED RELEASE ORAL at 10:31

## 2024-05-21 RX ADMIN — OXYBUTYNIN CHLORIDE 15 MG: 5 TABLET, EXTENDED RELEASE ORAL at 10:31

## 2024-05-21 RX ADMIN — POTASSIUM CHLORIDE 10 MEQ: 7.46 INJECTION, SOLUTION INTRAVENOUS at 05:30

## 2024-05-21 RX ADMIN — OXYCODONE 5 MG: 5 TABLET ORAL at 21:02

## 2024-05-21 RX ADMIN — POTASSIUM CHLORIDE 10 MEQ: 7.46 INJECTION, SOLUTION INTRAVENOUS at 03:34

## 2024-05-21 RX ADMIN — POTASSIUM CHLORIDE 10 MEQ: 7.46 INJECTION, SOLUTION INTRAVENOUS at 04:30

## 2024-05-21 RX ADMIN — POTASSIUM CHLORIDE 10 MEQ: 7.46 INJECTION, SOLUTION INTRAVENOUS at 01:30

## 2024-05-21 RX ADMIN — CEFTRIAXONE SODIUM 1000 MG: 1 INJECTION, POWDER, FOR SOLUTION INTRAMUSCULAR; INTRAVENOUS at 08:59

## 2024-05-21 RX ADMIN — POTASSIUM CHLORIDE 10 MEQ: 7.46 INJECTION, SOLUTION INTRAVENOUS at 06:30

## 2024-05-21 RX ADMIN — FLUTICASONE PROPIONATE 2 SPRAY: 50 SPRAY, METERED NASAL at 10:32

## 2024-05-21 RX ADMIN — POTASSIUM CHLORIDE 10 MEQ: 7.46 INJECTION, SOLUTION INTRAVENOUS at 02:30

## 2024-05-21 RX ADMIN — ACETAMINOPHEN 650 MG: 325 TABLET ORAL at 09:04

## 2024-05-21 RX ADMIN — CALCIUM GLUCONATE 2000 MG: 20 INJECTION, SOLUTION INTRAVENOUS at 10:34

## 2024-05-21 ASSESSMENT — PAIN DESCRIPTION - LOCATION
LOCATION: BACK

## 2024-05-21 ASSESSMENT — PAIN DESCRIPTION - DESCRIPTORS
DESCRIPTORS: ACHING
DESCRIPTORS: ACHING

## 2024-05-21 ASSESSMENT — PAIN DESCRIPTION - ORIENTATION
ORIENTATION: RIGHT;LEFT;MID
ORIENTATION: MID
ORIENTATION: MID

## 2024-05-21 ASSESSMENT — PAIN SCALES - GENERAL
PAINLEVEL_OUTOF10: 5
PAINLEVEL_OUTOF10: 3
PAINLEVEL_OUTOF10: 3
PAINLEVEL_OUTOF10: 0
PAINLEVEL_OUTOF10: 3
PAINLEVEL_OUTOF10: 6

## 2024-05-21 ASSESSMENT — PAIN SCALES - WONG BAKER: WONGBAKER_NUMERICALRESPONSE: NO HURT

## 2024-05-21 NOTE — PLAN OF CARE
SLP completed evaluation. Please refer to notes in EMR.      Thank you,   Alie Latham M.A. CCC-SLP   Speech-Language Pathologist

## 2024-05-21 NOTE — PROGRESS NOTES
Speech Language Pathology  Facility/Department: [place] [address] - MED SURG/ORTHO  Initial Speech/Language/Cognitive Assessment       NAME:Shelley Maurer  : 1945 (79 y.o.)   MRN: [id number]  ROOM: [facility]/05-  ADMISSION DATE: 2024  PATIENT DIAGNOSIS(ES): Hypocalcemia [E83.51]  Hypokalemia [E87.6]  Hypomagnesemia [E83.42]  Slurred speech [R47.81]  UTI (urinary tract infection) [N39.0]  Generalized abdominal pain [R10.84]  Prolonged Q-T interval on ECG [R94.31]  Acute cystitis with hematuria [N30.01]  Wound of buttock, unspecified laterality, initial encounter [S31.643Q]  Altered mental status, unspecified altered mental status type [R41.82]  Patient Active Problem List    Diagnosis Date Noted    UTI (urinary tract infection) 2024     Past Medical History:   Diagnosis Date    Meredith esophagus     Bladder prolapse     Depression     Diabetes mellitus (HCC)     Hyperlipidemia     Osteoporosis     Tachycardia      Past Surgical History:   Procedure Laterality Date    BACK SURGERY      cervical    BLADDER SUSPENSION      X 3    CHOLECYSTECTOMY      COLONOSCOPY  5-3-16    normal    HYSTERECTOMY (CERVIX STATUS UNKNOWN)      NOSE SURGERY      fracture    TOE SURGERY      UPPER GASTROINTESTINAL ENDOSCOPY  13    UPPER GASTROINTESTINAL ENDOSCOPY  5-3-16    biopsy esophagus + dilatation    WRIST SURGERY       Allergies   Allergen Reactions    Food      Shellfish - throat closes       Date of Evaluation: 2024   Evaluating Therapist: BRENDA Hawkins    Subjective:   Chart Reviewed: : [x] Yes [] No    Onset Date: 24    Recent Results of CT of Head:  Date: 24  Impressions:   No acute intracranial abnormality.     Medical record review/interview: Per MD H&P on 24: \"79 y.o. female who presented to [facility] with altered mental status.  PMHx significant for DM, HLD.       Patient unable to answer questions at this time. No family at bedside.      While in ED patient was found to be  assess cognition. The pt scored a 11/30 with a score of 27 or greater considered WNL per the SLUMS. See pt's scores on each subtest below. This score is judged to be a severe cognitive deficit when considering prior level of independence and high level of function. Areas of strength include immediate recall and comprehension of short story. Areas of deficit include orientation, delayed recall, calculations, attention, divergent naming, and executive function skills. Pt will benefit from continued speech therapy to promote improvement in these areas and achieve safe and independent return home at [facility] if safe and able. Discussed areas of deficit, goals, POC for ST with pt who is in agreement for participation in therapy.        Barriers to home discharge:   [x] inability to manage medications, will need assist/supervision  [x] inability to manage finances, will need assist/supervision  [x] inability to effectively communicate in emergent situations (ex: calling 911)  [x] severity of cognition (severe) with reduced insight negatively impacting safety/independence        Formal Assessments:   [employer] [facility] Status ([facility]) Examination   Section / subtest   Score Comments   Orientation    0/3    Short-term recall    1/5    Calculations    1/3    Naming    0/3  3 animals named   Attention: number repetition    0/2    Visuospatial tasks: Clock drawing and shape identification    Clock: 1/4    Shapes: 2/2    Auditory comprehension  6/8     Total score: 11/30         Goals:  Long Term Goals:   Time Frame for Long Term Goals: 7 Days (5/29/24)  Goal 1: Pt will demonstrate improved cognitive linguistic function for safe return to prior level of care.      Short Term Goals:  Time Frame for Short Term Goals: 5 Days (5/26/24)  Goal 1: Pt will complete language related functional activities on 70% opp given min-mod cues   Goal 2: Pt will improve attention to detail for graded complex information to 70%, for improved

## 2024-05-21 NOTE — CONSULTS
Comprehensive Nutrition Assessment    Type and Reason for Visit:  Initial, Consult    Nutrition Recommendations/Plan:   Continue soft and bite sized diet and encourage PO intake   RD to add magic cups BID   Monitor nutrition adequacy, pertinent labs, bowel habits, wt changes, and clinical progress     Malnutrition Assessment:  Malnutrition Status:  At risk for malnutrition (Comment) (05/21/24 5657)    Context:  Acute Illness     Findings of the 6 clinical characteristics of malnutrition:  Energy Intake:  Mild decrease in energy intake (Comment)    Nutrition Assessment:    Consult for poor intake: 79 y.o. f w/ PMHx significant for DM, HLD admitted w/ AMS. SLP eval today, recommended soft and bite sized diet. PO intakes 0% of meals in EMR. Pt poor hxn. Pt reports poor intake over the past few days/ year. RD observed pt ate 50-76% of lunch meal tray in room. Reports wt loss but unsure of how much. NIRAJ wt changes in EMR d/t stated weight hx. Declined ensure but willing to trial magic cups, RD to add BID. Continue to encourage PO intake, will continue to monitor.    Nutrition Related Findings:    K+ 3.2. + BM today. Wound Type: None       Current Nutrition Intake & Therapies:    Average Meal Intake: 0%, 51-75%  Average Supplements Intake: None Ordered  ADULT DIET; Dysphagia - Soft and Bite Sized  ADULT ORAL NUTRITION SUPPLEMENT; Breakfast, Dinner; Standard High Calorie/High Protein Oral Supplement    Anthropometric Measures:  Height: 170.2 cm (5' 7\")  Ideal Body Weight (IBW): 135 lbs (61 kg)       Current Body Weight: 60.3 kg (133 lb), 98.5 % IBW. Weight Source: Bed Scale  Current BMI (kg/m2): 20.8        Weight Adjustment For: No Adjustment                 BMI Categories: Normal Weight (BMI 18.5-24.9)    Estimated Daily Nutrient Needs:  Energy Requirements Based On: Kcal/kg (25-30 kcals/kg)  Weight Used for Energy Requirements: Current (61 kg)  Energy (kcal/day): 5685-2979  Weight Used for Protein Requirements: Current

## 2024-05-21 NOTE — PLAN OF CARE

## 2024-05-21 NOTE — PROGRESS NOTES
Physical Therapy  Facility/Department: [place] [id number] - MED SURG/ORTHO  Physical Therapy Initial Assessment/Treatment     Name: Shelley Maurer  : 1945  MRN: [id number]  Date of Service: 2024    Discharge Recommendations:  Subacute/Skilled Nursing Facility   PT Equipment Recommendations  Equipment Needed: No  Other: Defer      Patient Diagnosis(es): The primary encounter diagnosis was Altered mental status, unspecified altered mental status type. Diagnoses of Generalized abdominal pain, Wound of buttock, unspecified laterality, initial encounter, Hypokalemia, Hypocalcemia, Prolonged Q-T interval on ECG, Acute cystitis with hematuria, Hypomagnesemia, and Slurred speech were also pertinent to this visit.  Past Medical History:  has a past medical history of Meredith esophagus, Bladder prolapse, Depression, Diabetes mellitus (HCC), Hyperlipidemia, Osteoporosis, and Tachycardia.  Past Surgical History:  has a past surgical history that includes Wrist surgery; Toe Surgery; bladder suspension; Hysterectomy; Cholecystectomy; Nose surgery; back surgery; Upper gastrointestinal endoscopy (13); Upper gastrointestinal endoscopy (5-3-16); and Colonoscopy (5-3-16).    Therapy discharge recommendations are subject to collaboration from the patient’s interdisciplinary healthcare team, including MD and case management recommendations.    Barriers to Home Discharge:   [x] Steps to access home entry or bed/bath: 1 CIRILO    [x] Unable to transfer, ambulate, or propel wheelchair household distances without assist   [x] Limited available assist at home upon discharge    [] Patient or family requests d/c to post-acute facility    [x] Poor cognition/safety awareness for d/c to home alone    [] Unable to maintain ordered weight bearing status    [] Patient with salient signs of long-standing immobility   [x] Decreased independence with ADLs   [x] Increased risk for falls   [] Other:    If pt is unable to be seen after this session,

## 2024-05-21 NOTE — PROGRESS NOTES
Arrival from ED to room 541 via stretcher safely and in stable condition. VSS - afebrile, hypertensive. Pt is alert and oriented x 3 (disoriented to time) with no reported history of falls. Assessment completed as charted. Bed is in lowest position with 3/4 bed rails raised, bed alarm turned on, wheels locked and call light within reach - patient wearing non-skid socks and verbalizes understanding to call out for assistance. No further requests at this time. Plan of care ongoing.     Vitals:    05/20/24 2044   BP: (!) 173/78   Pulse: 90   Resp: 18   Temp: 99.1 °F (37.3 °C)   SpO2: 95%

## 2024-05-21 NOTE — PROGRESS NOTES
Speech Language Pathology  Clinical Bedside Swallow Assessment  Facility/Department: [facility] - MED SURG/ORTHO        Recommendations:  Diet recommendation: IDDSI 6 Soft and Bite Sized Solids; IDDSI 0 Thin Liquids; Meds whole with thin liquids  *Recommend supervision with PO d/t AMS, PO only when pt alert  Instrumentation: Not clinically indicated at this time. Will continue to monitor  Risk management: upright for all intake, stay upright for at least 30 mins after intake, small bites/sips, close supervision, oral care 2-3x/day to reduce adverse affects in the event of aspiration, increase physical mobility as able, alternate bites/sips, slow rate of intake, general aspiration precautions, and hold PO and contact SLP if s/s of aspiration or worsening respiratory status develop.      NAME:Shelley Maurer  : 1945 (79 y.o.)   MRN: [id number]  ROOM: 0541/0541-01  ADMISSION DATE: 2024  PATIENT DIAGNOSIS(ES): Hypocalcemia [E83.51]  Hypokalemia [E87.6]  Hypomagnesemia [E83.42]  Slurred speech [R47.81]  UTI (urinary tract infection) [N39.0]  Generalized abdominal pain [R10.84]  Prolonged Q-T interval on ECG [R94.31]  Acute cystitis with hematuria [N30.01]  Wound of buttock, unspecified laterality, initial encounter [S31.599A]  Altered mental status, unspecified altered mental status type [R41.82]  Chief Complaint   Patient presents with    Altered Mental Status     Pt to ED from home via EMS with CC of AMS.  EMS states that a home health aid said last known normal was 1 week ago.  Pt oriented x2.     Patient Active Problem List    Diagnosis Date Noted    UTI (urinary tract infection) 2024     Past Medical History:   Diagnosis Date    Meredith esophagus     Bladder prolapse     Depression     Diabetes mellitus (HCC)     Hyperlipidemia     Osteoporosis     Tachycardia      Past Surgical History:   Procedure Laterality Date    BACK SURGERY      cervical    BLADDER SUSPENSION      X 3    CHOLECYSTECTOMY         [x] inability to manage medications, will need assist/supervision  [x] inability to manage finances, will need assist/supervision  [x] inability to effectively communicate in emergent situations (ex: calling 911)  [x] severity of cognition (severe) with reduced insight negatively impacting safety/independence      Recommendations:  Diet recommendation: IDDSI 6 Soft and Bite Sized Solids; IDDSI 0 Thin Liquids; Meds whole with thin liquids  *Recommend supervision with PO d/t AMS, PO only when pt alert  Instrumentation: Not clinically indicated at this time. Will continue to monitor  Risk management: upright for all intake, stay upright for at least 30 mins after intake, small bites/sips, close supervision, oral care 2-3x/day to reduce adverse affects in the event of aspiration, increase physical mobility as able, alternate bites/sips, slow rate of intake, general aspiration precautions, and hold PO and contact SLP if s/s of aspiration or worsening respiratory status develop.    Prognosis: Fair - Good    Recommended Intervention:    [x] Dysphagia tx  [] Videostroboscopy                      [] NPO   [] MBS       [] Speech/Cog Eval    [x] Therapeutic PO Trials     [] Ice Chips   [] Other:  [] FEES                                                 Dysphagia Therapeutic Intervention:   []  Bolus control Exercises  []  Oral Motor Exercises  []  Ramirez Water Protocol  []  Thermal Stimulation  []  Oral Care    []  Vital Stim/NMES  []  Laryngeal Exercises  [x]  Patient/Family Education  []  Pharyngeal Exercises  [x]  Therapeutic PO trials with SLP  [x]  Diet tolerance monitoring  []  Other:     Referrals:   [] ENT    []   [] Pulmonology [] GI  [] Neurology  [] RD  [] OT/ PT  [x] N/A    Goals:  Short Term Goals:  Timeframe for Short Term Goals: (5 days 5/26/24)  Goal 1: The patient will tolerate recommended diet with no clinical s/s of aspiration 5/5  Goal 2: The patient will tolerate therapeutic diet upgrade

## 2024-05-21 NOTE — PROGRESS NOTES
Occupational Therapy  Facility/Department: [place] [address] - MED SURG/ORTHO  Occupational Therapy Initial Assessment and Treatment    Name: Shelley Maurer  : 1945  MRN: [id number]  Date of Service: 2024    Discharge Recommendations:  Subacute/Skilled Nursing Facility  OT Equipment Recommendations  Equipment Needed: No  Other: defer     Therapy discharge recommendations are subject to collaboration from the patient’s interdisciplinary healthcare team, including MD and case management recommendations.    Barriers to Home Discharge:   [x] Steps to access home entry or bed/bath:   [x] Unable to transfer, ambulate, or propel wheelchair household distances without assist   [x] Limited available assist at home upon discharge    [] Patient or family requests d/c to post-acute facility    [x] Poor cognition/safety awareness for d/c to home alone    [] Unable to maintain ordered weight bearing status    [] Patient with salient signs of long-standing immobility   [x] Decreased independence with ADLs   [x] Increased risk for falls   [] Other:    If pt is unable to be seen after this session, please let this note serve as discharge summary.  Please see case management note for discharge disposition.  Thank you.    Patient Diagnosis(es): The primary encounter diagnosis was Altered mental status, unspecified altered mental status type. Diagnoses of Generalized abdominal pain, Wound of buttock, unspecified laterality, initial encounter, Hypokalemia, Hypocalcemia, Prolonged Q-T interval on ECG, Acute cystitis with hematuria, Hypomagnesemia, and Slurred speech were also pertinent to this visit.  Past Medical History:  has a past medical history of Meredith esophagus, Bladder prolapse, Depression, Diabetes mellitus (HCC), Hyperlipidemia, Osteoporosis, and Tachycardia.  Past Surgical History:  has a past surgical history that includes Wrist surgery; Toe Surgery; bladder suspension; Hysterectomy; Cholecystectomy; Nose surgery; back      Restrictions  Restrictions/Precautions  Restrictions/Precautions: Fall Risk  Position Activity Restriction  Other position/activity restrictions: up with assistance, abbi, DRU    Subjective   General  Chart Reviewed: Yes  Patient assessed for rehabilitation services?: Yes  Subjective  Subjective: Pt supine in bed at start of session. Agreeable to OT eval and tx. Reports stomach pain but does not rate numerically (RN aware)     Social/Functional History  Social/Functional History  Lives With: Alone  Type of Home: House  Home Layout: Two level, Performs ADL's on one level, Able to Live on Main level with bedroom/bathroom  Home Access: Stairs to enter without rails  Entrance Stairs - Number of Steps: 1 CIRILO  Entrance Stairs - Rails: None  Bathroom Shower/Tub: Tub/Shower unit  Bathroom Toilet: Handicap height  Bathroom Equipment: Tub transfer bench, Toilet raiser, Grab bars around toilet  Bathroom Accessibility: Not accessible  Home Equipment: Rollator  Has the patient had two or more falls in the past year or any fall with injury in the past year?: Yes (reports fall 3-4 months ago in bathtub, shoulder fx)  Receives Help From: Home health  ADL Assistance: Independent  Homemaking Assistance: Needs assistance (home health aid 1x week for cleaning, son completes grocery shopping)  Homemaking Responsibilities: Yes  Meal Prep Responsibility: Primary  Laundry Responsibility: Primary  Ambulation Assistance: Independent (with rollator)  Transfer Assistance: Independent  Active : No  Patient's  Info: son  Mode of Transportation: Car  Occupation: Retired  Type of Occupation: RN at [employer]  Leisure & Hobbies: boating  Additional Comments: Pt may be poor historian, information will need verified for accuracy.       Objective   Temp: 98 °F (36.7 °C)  Pulse: 82  Heart Rate Source: Monitor  Respirations: 18  SpO2: 94 %  O2 Device: None (Room air)  BP: (!) 153/56  MAP (Calculated): 88  BP Location: Left upper arm  BP  Method: Automatic  Patient Position: Supine             Safety Devices  Type of Devices: Call light within reach;Chair alarm in place;Patient at risk for falls;Gait belt;Left in chair;Nurse notified  Restraints  Restraints Initially in Place: No  Bed Mobility Training  Bed Mobility Training: Yes  Supine to Sit: Minimum assistance;Additional time;Adaptive equipment (HOB elevated, towards L with bed rail use)  Sit to Supine:  (in chair at end of session)  Balance  Sitting: Intact  Standing: Impaired  Standing - Static: Constant support;Fair  Standing - Dynamic: Constant support;Poor  Transfer Training  Transfer Training: Yes  Sit to Stand: Minimum assistance;Adaptive equipment;Additional time  Stand to Sit: Minimum assistance;Adaptive equipment;Additional time  Bed to Chair: Minimum assistance;Moderate assistance;Adaptive equipment;Additional time (bed > toilet > chair)  Toilet Transfer: Moderate assistance;Additional time;Adaptive equipment (with grab bar use, posterior LOB when standing)     AROM: Generally decreased, functional  Strength: Generally decreased, functional  ADL  Grooming: Setup;Stand by assistance;Verbal cueing;Increased time to complete  Grooming Skilled Clinical Factors: Seated in chair pt brushed hair, brushed teeth, and washed face  LE Dressing: Maximum assistance;Increased time to complete;Setup;Verbal cueing  LE Dressing Skilled Clinical Factors: doffed/ donned brief  Toileting: Maximum assistance;Increased time to complete;Setup;Adaptive equipment  Toileting Skilled Clinical Factors: purewick in place, assisted to toilet to complete further toileting needs, maxA for LB dressing, MaxA for hygiene thoroughness  Functional Mobility: Minimal assistance;Moderate assistance;Increased time to complete;Adaptive equipment  Functional Mobility Skilled Clinical Factors: Min-ModA for functional mobility to/from bathroom with RW, multiple LOB requiring modA to correct  Skin Care: Bath wipes

## 2024-05-21 NOTE — PROGRESS NOTES
Hospital Medicine Progress Note      Date of Admission: 5/20/2024  Hospital Day: 2    Chief Admission Complaint:  AMS     Subjective: Patient is in hospital bed alert and oriented.  No new issues or complaints today.  Patient is oriented to self, place, and time.  Worked with physical therapy and Occupational Therapy which went well.  Continues to have shortness of breath with movement. Spoke with nursing staff and was informed of no acute issues overnight.    Presenting Admission History:       79 y.o. female who presented to [facility] with altered mental status.  PMHx significant for DM, HLD.       Patient unable to answer questions at this time. No family at bedside.      While in ED patient was found to be afebrile with a temp of 99.3F. Respiratory rate 16 sating at 94% on RA. /96. HR 84. K 2.6. Anion gap 23. Mag 0.80. Calcium 5.6. BNP 4135. Inital trop 72 with repeat of 77. Liver panel unremarable. Hgb 9.7, MCV 76.9.  UA showed UTI, with culture pending. CT-head unremarkable. CT-abd/pelvis showed cystitis. CXR showed possible PNA vs atelectasis. Patient was given calcium gluconate, rocephin, ativan, K, and mag while in the ED. It was at this time patient would be admitted to hospital service for further evaluation and treatment.     Assessment/Plan:      Current Principal Problem:  UTI (urinary tract infection)    UTI - admission U/A c/w acute cystitis.  Infection evidenced by U/A, Cx results and/or signs/sxs of clinical infection despite Cx results.  Started on empiric abx on rocephin pending Cx results.  Changed to DAILY dosing.     Encephalopathy - acute metabolic, 2nd to above.  Will continue to follow clinical response w/ supportive care PRN.      Anemia - etiology clinically unable to determine, w/out evidence of active bleeding/hemolysis. Asymptomatic w/out indication for transfusion. Follow serial labs - documented and reviewed.      HypoKalemia - etiology clinically unable to determine.   including timing/barriers to discharge, need for support services and placement decision   [x] Imaging personally reviewed and interpreted, includes:   [x] Telemetry monitoring w/ atrial tachycardia with PVCs with a rate of 116   [] Data Review (any 3)  [] Collateral history obtained from:    [] All available Consultant notes from yesterday/today were reviewed  [x] All current labs were reviewed and interpreted for clinical significance   [x] Appropriate follow-up labs were ordered      Medications:  Personally reviewed in detail in conjunction w/ labs as documented for evidence of drug toxicity.     Infusion Medications    sodium chloride       Scheduled Medications    sodium chloride flush  5-40 mL IntraVENous 2 times per day    enoxaparin  40 mg SubCUTAneous Daily    cefTRIAXone (ROCEPHIN) IV  1,000 mg IntraVENous Q24H     PRN Meds: sodium chloride flush, sodium chloride, ondansetron **OR** ondansetron, acetaminophen **OR** acetaminophen, polyethylene glycol, potassium chloride **OR** potassium alternative oral replacement **OR** potassium chloride, magnesium sulfate     Labs:  Personally reviewed and interpreted for clinical significance.     Recent Labs     05/20/24 1526 05/21/24  0543   WBC 9.9 7.7   HGB 9.7* 9.8*   HCT 30.1* 30.9*    229     Recent Labs     05/20/24 1526 05/21/24  0002 05/21/24  0543     --  138   K 2.6* 2.7* 3.2*   CL 96*  --  100   CO2 21  --  17*   BUN 8  --  5*   CREATININE 0.9  --  0.9   CALCIUM 5.6*  --  6.5*   MG 0.80* 2.70* 2.20     Recent Labs     05/20/24 1526 05/20/24  1726   PROBNP 4,135*  --    TROPHS 72* 77*     No results for input(s): \"LABA1C\" in the last 72 hours.  Recent Labs     05/20/24  1526 05/21/24  0543   AST 30 24   ALT 18 16   BILIDIR <0.2  --    BILITOT 0.3 0.3   ALKPHOS 78 77     No results for input(s): \"INR\", \"LACTA\", \"TSH\" in the last 72 hours.    Urine Cultures: No results found for: \"LABURIN\"  Blood Cultures:   No results found for: \"BC\"  No

## 2024-05-21 NOTE — CARE COORDINATION
CM update: Patient is being followed by [facility] she will need orders to resume at time of discharge.Deisy Sosa RN

## 2024-05-21 NOTE — CARE COORDINATION
Case Management Assessment  Initial Evaluation    Date/Time of Evaluation: 5/21/2024 10:03 AM  Assessment Completed by: Deisy Sosa RN    If patient is discharged prior to next notation, then this note serves as note for discharge by case management.    Patient Name: Shelley Maurer                   YOB: 1945  Diagnosis: Hypocalcemia [E83.51]  Hypokalemia [E87.6]  Hypomagnesemia [E83.42]  Slurred speech [R47.81]  UTI (urinary tract infection) [N39.0]  Generalized abdominal pain [R10.84]  Prolonged Q-T interval on ECG [R94.31]  Acute cystitis with hematuria [N30.01]  Wound of buttock, unspecified laterality, initial encounter [S31.254J]  Altered mental status, unspecified altered mental status type [R41.82]                   Date / Time: 5/20/2024  2:51 PM    Patient Admission Status: Inpatient   Readmission Risk (Low < 19, Mod (19-27), High > 27): Readmission Risk Score: 12.5    Current PCP: Christina Montague MD  PCP verified by CM? Yes (Christina Montague MD)    Chart Reviewed: Yes      History Provided by: Patient, Child/Family  Patient Orientation: Alert and Oriented (More alert today answering questions appropriatly)    Patient Cognition: Alert    Hospitalization in the last 30 days (Readmission):  No    If yes, Readmission Assessment in  Navigator will be completed.    Advance Directives:      Code Status: Full Code   Patient's Primary Decision Maker is: Legal Next of Kin      Discharge Planning:    Patient lives with: Alone Type of Home: House  Primary Care Giver: Self  Patient Support Systems include: Children   Current Financial resources: Medicare  Current community resources: Other (Comment) (assistance with household chores through the [facility])  Current services prior to admission: Durable Medical Equipment            Current DME: Walker            Type of Home Care services:  None    ADLS  Prior functional level: Independent in ADLs/IADLs, Assistance with the following:, Shopping,

## 2024-05-22 ENCOUNTER — APPOINTMENT (OUTPATIENT)
Dept: GENERAL RADIOLOGY | Age: 79
DRG: 689 | End: 2024-05-22
Payer: MEDICARE

## 2024-05-22 LAB
ALBUMIN SERPL-MCNC: 3.7 G/DL (ref 3.4–5)
ALBUMIN/GLOB SERPL: 1.1 {RATIO} (ref 1.1–2.2)
ALP SERPL-CCNC: 76 U/L (ref 40–129)
ALT SERPL-CCNC: 16 U/L (ref 10–40)
ANION GAP SERPL CALCULATED.3IONS-SCNC: 15 MMOL/L (ref 3–16)
AST SERPL-CCNC: 18 U/L (ref 15–37)
BACTERIA UR CULT: ABNORMAL
BASOPHILS # BLD: 0.1 K/UL (ref 0–0.2)
BASOPHILS NFR BLD: 1 %
BILIRUB SERPL-MCNC: 0.3 MG/DL (ref 0–1)
BUN SERPL-MCNC: 8 MG/DL (ref 7–20)
CALCIUM SERPL-MCNC: 7.8 MG/DL (ref 8.3–10.6)
CHLORIDE SERPL-SCNC: 102 MMOL/L (ref 99–110)
CO2 SERPL-SCNC: 19 MMOL/L (ref 21–32)
CREAT SERPL-MCNC: 1 MG/DL (ref 0.6–1.2)
DEPRECATED RDW RBC AUTO: 17.9 % (ref 12.4–15.4)
EOSINOPHIL # BLD: 0.2 K/UL (ref 0–0.6)
EOSINOPHIL NFR BLD: 2.5 %
GFR SERPLBLD CREATININE-BSD FMLA CKD-EPI: 57 ML/MIN/{1.73_M2}
GLUCOSE BLD-MCNC: 151 MG/DL (ref 70–99)
GLUCOSE SERPL-MCNC: 161 MG/DL (ref 70–99)
HCT VFR BLD AUTO: 31 % (ref 36–48)
HGB BLD-MCNC: 9.7 G/DL (ref 12–16)
LYMPHOCYTES # BLD: 1.4 K/UL (ref 1–5.1)
LYMPHOCYTES NFR BLD: 19.3 %
MAGNESIUM SERPL-MCNC: 1.9 MG/DL (ref 1.8–2.4)
MCH RBC QN AUTO: 24.4 PG (ref 26–34)
MCHC RBC AUTO-ENTMCNC: 31.3 G/DL (ref 31–36)
MCV RBC AUTO: 78.1 FL (ref 80–100)
MONOCYTES # BLD: 0.4 K/UL (ref 0–1.3)
MONOCYTES NFR BLD: 5.8 %
NEUTROPHILS # BLD: 5.1 K/UL (ref 1.7–7.7)
NEUTROPHILS NFR BLD: 71.4 %
ORGANISM: ABNORMAL
PERFORMED ON: ABNORMAL
PLATELET # BLD AUTO: 233 K/UL (ref 135–450)
PMV BLD AUTO: 8.2 FL (ref 5–10.5)
POTASSIUM SERPL-SCNC: 3.1 MMOL/L (ref 3.5–5.1)
PROT SERPL-MCNC: 7 G/DL (ref 6.4–8.2)
RBC # BLD AUTO: 3.98 M/UL (ref 4–5.2)
SODIUM SERPL-SCNC: 136 MMOL/L (ref 136–145)
WBC # BLD AUTO: 7.1 K/UL (ref 4–11)

## 2024-05-22 PROCEDURE — 97116 GAIT TRAINING THERAPY: CPT

## 2024-05-22 PROCEDURE — 2580000003 HC RX 258: Performed by: STUDENT IN AN ORGANIZED HEALTH CARE EDUCATION/TRAINING PROGRAM

## 2024-05-22 PROCEDURE — 97530 THERAPEUTIC ACTIVITIES: CPT

## 2024-05-22 PROCEDURE — 1200000000 HC SEMI PRIVATE

## 2024-05-22 PROCEDURE — 85025 COMPLETE CBC W/AUTO DIFF WBC: CPT

## 2024-05-22 PROCEDURE — 80053 COMPREHEN METABOLIC PANEL: CPT

## 2024-05-22 PROCEDURE — 83735 ASSAY OF MAGNESIUM: CPT

## 2024-05-22 PROCEDURE — 36415 COLL VENOUS BLD VENIPUNCTURE: CPT

## 2024-05-22 PROCEDURE — 73502 X-RAY EXAM HIP UNI 2-3 VIEWS: CPT

## 2024-05-22 PROCEDURE — 6360000002 HC RX W HCPCS: Performed by: STUDENT IN AN ORGANIZED HEALTH CARE EDUCATION/TRAINING PROGRAM

## 2024-05-22 PROCEDURE — 6370000000 HC RX 637 (ALT 250 FOR IP): Performed by: STUDENT IN AN ORGANIZED HEALTH CARE EDUCATION/TRAINING PROGRAM

## 2024-05-22 RX ORDER — 0.9 % SODIUM CHLORIDE 0.9 %
500 INTRAVENOUS SOLUTION INTRAVENOUS ONCE
Status: COMPLETED | OUTPATIENT
Start: 2024-05-22 | End: 2024-05-22

## 2024-05-22 RX ADMIN — LORAZEPAM 0.5 MG: 0.5 TABLET ORAL at 00:28

## 2024-05-22 RX ADMIN — CEFTRIAXONE SODIUM 1000 MG: 1 INJECTION, POWDER, FOR SOLUTION INTRAMUSCULAR; INTRAVENOUS at 08:58

## 2024-05-22 RX ADMIN — METOPROLOL SUCCINATE 50 MG: 50 TABLET, EXTENDED RELEASE ORAL at 08:58

## 2024-05-22 RX ADMIN — OXYBUTYNIN CHLORIDE 15 MG: 5 TABLET, EXTENDED RELEASE ORAL at 08:58

## 2024-05-22 RX ADMIN — ENOXAPARIN SODIUM 40 MG: 100 INJECTION SUBCUTANEOUS at 08:49

## 2024-05-22 RX ADMIN — SODIUM CHLORIDE, PRESERVATIVE FREE 10 ML: 5 INJECTION INTRAVENOUS at 19:38

## 2024-05-22 RX ADMIN — HYDROMORPHONE HYDROCHLORIDE 0.5 MG: 1 INJECTION, SOLUTION INTRAMUSCULAR; INTRAVENOUS; SUBCUTANEOUS at 23:59

## 2024-05-22 RX ADMIN — ATORVASTATIN CALCIUM 40 MG: 40 TABLET, FILM COATED ORAL at 08:49

## 2024-05-22 RX ADMIN — FLUTICASONE PROPIONATE 2 SPRAY: 50 SPRAY, METERED NASAL at 08:50

## 2024-05-22 RX ADMIN — PANTOPRAZOLE SODIUM 40 MG: 40 TABLET, DELAYED RELEASE ORAL at 06:13

## 2024-05-22 RX ADMIN — OXYCODONE 5 MG: 5 TABLET ORAL at 06:15

## 2024-05-22 RX ADMIN — OXYCODONE 10 MG: 5 TABLET ORAL at 19:42

## 2024-05-22 RX ADMIN — HYDROMORPHONE HYDROCHLORIDE 0.5 MG: 1 INJECTION, SOLUTION INTRAMUSCULAR; INTRAVENOUS; SUBCUTANEOUS at 10:44

## 2024-05-22 RX ADMIN — SODIUM CHLORIDE 500 ML: 9 INJECTION, SOLUTION INTRAVENOUS at 10:49

## 2024-05-22 ASSESSMENT — PAIN SCALES - GENERAL
PAINLEVEL_OUTOF10: 4
PAINLEVEL_OUTOF10: 6
PAINLEVEL_OUTOF10: 2
PAINLEVEL_OUTOF10: 10
PAINLEVEL_OUTOF10: 0
PAINLEVEL_OUTOF10: 7
PAINLEVEL_OUTOF10: 4
PAINLEVEL_OUTOF10: 10

## 2024-05-22 ASSESSMENT — PAIN DESCRIPTION - ORIENTATION
ORIENTATION: RIGHT
ORIENTATION: LOWER;RIGHT
ORIENTATION: LOWER
ORIENTATION: RIGHT;LOWER

## 2024-05-22 ASSESSMENT — PAIN DESCRIPTION - FREQUENCY
FREQUENCY: CONTINUOUS
FREQUENCY: CONTINUOUS

## 2024-05-22 ASSESSMENT — PAIN DESCRIPTION - DESCRIPTORS
DESCRIPTORS: ACHING
DESCRIPTORS: ACHING
DESCRIPTORS: ACHING;DISCOMFORT;STABBING
DESCRIPTORS: ACHING;DISCOMFORT;THROBBING;STABBING
DESCRIPTORS: ACHING
DESCRIPTORS: ACHING

## 2024-05-22 ASSESSMENT — PAIN DESCRIPTION - PAIN TYPE
TYPE: ACUTE PAIN
TYPE: ACUTE PAIN

## 2024-05-22 ASSESSMENT — PAIN DESCRIPTION - ONSET: ONSET: PROGRESSIVE

## 2024-05-22 ASSESSMENT — PAIN DESCRIPTION - LOCATION
LOCATION: HIP
LOCATION: BACK
LOCATION: HIP
LOCATION: HIP;BACK
LOCATION: HIP
LOCATION: HIP

## 2024-05-22 ASSESSMENT — PAIN - FUNCTIONAL ASSESSMENT: PAIN_FUNCTIONAL_ASSESSMENT: PREVENTS OR INTERFERES WITH ALL ACTIVE AND SOME PASSIVE ACTIVITIES

## 2024-05-22 ASSESSMENT — PAIN SCALES - WONG BAKER: WONGBAKER_NUMERICALRESPONSE: NO HURT

## 2024-05-22 NOTE — PROGRESS NOTES
Physical Therapy  Facility/Department: [facility] C5 - MED SURG/ORTHO  Daily Treatment Note  NAME: Shelley Maurer  : 1945  MRN: [id number]    Date of Service: 2024    Discharge Recommendations:  Subacute/Skilled Nursing Facility   PT Equipment Recommendations  Equipment Needed: No  Other: Defer    Patient Diagnosis(es): The primary encounter diagnosis was Altered mental status, unspecified altered mental status type. Diagnoses of Generalized abdominal pain, Wound of buttock, unspecified laterality, initial encounter, Hypokalemia, Hypocalcemia, Prolonged Q-T interval on ECG, Acute cystitis with hematuria, Hypomagnesemia, and Slurred speech were also pertinent to this visit.    Assessment   Assessment: Pt seen as co treat after fall earlier.  Pt having R hip pain /10, x ray was negative.  Pt reports fall was in setting of dizziness.  At this time pt required mod A supine to sit, mod/minAOf 2 STS and mod A Of 2 to amb with RW 6'.  Pt needed A to advance RLE with each step and constant verbal cues for srquencing.  Pt became more and more unsteady and B knees began to buckle, she was sat to chair with BP 87/52 .  She was returned to bed and BP was reassessed; 123/61 HR 87.  Pt reports she was again dizzy with this episode.  Pt is recommended for con't skilled PT and SNF at D/C.  Equipment Needed: No  Other: Defer     Plan    Physical Therapy Plan  General Plan: 3-5 times per week  Current Treatment Recommendations: Strengthening;ROM;Balance training;Gait training;Stair training;Functional mobility training;Home exercise program;Therapeutic activities;Safety education & training;Transfer training;Patient/Caregiver education & training;Endurance training     Restrictions  Restrictions/Precautions  Restrictions/Precautions: Fall Risk  Position Activity Restriction  Other position/activity restrictions: up with assistance, abbi, DRU     Subjective    Subjective  Subjective: Agreeable with  encouragement  Pain: hip 5/10  Orientation  Overall Orientation Status: Within Normal Limits  Orientation Level: Oriented X4  Cognition  Overall Cognitive Status: Exceptions  Arousal/Alertness: Appears intact  Following Commands: Follows one step commands consistently  Attention Span: Attends with cues to redirect  Memory: Decreased recall of recent events;Decreased recall of biographical Information  Safety Judgement: Decreased awareness of need for safety;Decreased awareness of need for assistance  Insights: Decreased awareness of deficits  Initiation: Requires cues for some  Sequencing: Requires cues for some     Objective   Vitals  Pulse: 95  BP: 135/70  MAP (Calculated): 92  SpO2: 91 %  O2 Device: None (Room air)  Comment: Pt orthostatic reporting dizziness only when prompted but starting to see knee buckling and unsteady gait BP upon sitting 87/52 , BP once back to bed 123/61 HR 87  Bed Mobility Training  Bed Mobility Training: Yes  Interventions: Safety awareness training;Manual cues;Verbal cues;Visual cues;Tactile cues  Rolling: Moderate assistance;Additional time  Supine to Sit: Moderate assistance;Additional time;Adaptive equipment (to L)  Sit to Supine: Moderate assistance;Assist X2;Additional time  Balance  Sitting: Intact  Standing: Impaired  Standing - Static: Constant support;Fair (RW)  Standing - Dynamic: Constant support;Poor (RW)  Transfer Training  Transfer Training: Yes  Sit to Stand: Minimum assistance;Moderate assistance;Assist X2;Additional time;Adaptive equipment (RW)  Stand to Sit: Minimum assistance;Moderate assistance;Assist X2;Additional time;Adaptive equipment (RW)  Bed to Chair: Moderate assistance;Assist X1;Assist X2;Additional time (SPT in light of low BP)  Gait  Gait Training: Yes  Overall Level of Assistance: Minimum assistance;Moderate assistance;Assist X2;Additional time;Adaptive equipment (RW)  Distance (ft): 6 Feet  Assistive Device: Walker, rolling;Gait

## 2024-05-22 NOTE — PROGRESS NOTES
MD PAGE via [place]:     Pt fell out of bed. Complaining of 2/10 right hip pain. denies hitting her head. a+o x4 at this time, states she was dreaming that she was at home. Telesitter initiated. Bed alarms already in place.      Post-fall vitals:     Vitals:    05/22/24 0539   BP: (!) 189/80   Pulse: 99   Resp: 18   Temp: 98.9 °F (37.2 °C)   SpO2: 99%

## 2024-05-22 NOTE — PROGRESS NOTES
Hospital Medicine Progress Note      Date of Admission: 5/20/2024  Hospital Day: 3    Chief Admission Complaint:  AMS     Subjective: Patient is in hospital bed alert and oriented.  No new issues or complaints today.  Patient states that she is in moderate pain due to her fall.  Patient states she feels \"not too good \".  Continues to have shortness of breath at rest.  Informed by nursing staff that patient fell in a.m. with x-ray negative for any fractures.     Presenting Admission History:       79 y.o. female who presented to [facility] with altered mental status.  PMHx significant for DM, HLD.       Patient unable to answer questions at this time. No family at bedside.      While in ED patient was found to be afebrile with a temp of 99.3F. Respiratory rate 16 sating at 94% on RA. /96. HR 84. K 2.6. Anion gap 23. Mag 0.80. Calcium 5.6. BNP 4135. Inital trop 72 with repeat of 77. Liver panel unremarable. Hgb 9.7, MCV 76.9.  UA showed UTI, with culture pending. CT-head unremarkable. CT-abd/pelvis showed cystitis. CXR showed possible PNA vs atelectasis. Patient was given calcium gluconate, rocephin, ativan, K, and mag while in the ED. It was at this time patient would be admitted to hospital service for further evaluation and treatment.     Assessment/Plan:      Current Principal Problem:  UTI (urinary tract infection)    UTI - admission U/A c/w acute cystitis.  Infection evidenced by U/A, Cx results and/or signs/sxs of clinical infection despite Cx results.  Started on empiric abx on rocephin pending Cx results.  Changed to DAILY dosing.    Tachycardia  -Etiology unclear at this time, possibly due to pain, possibly due to dehydration  -500 ml bolus ordered  -Will monitor and reassess at later time     Anemia - etiology clinically unable to determine, w/out evidence of active bleeding/hemolysis. Asymptomatic w/out indication for transfusion. Follow serial labs - documented and reviewed.

## 2024-05-22 NOTE — PROGRESS NOTES
Occupational Therapy  Facility/Department: [facility] C5 - MED SURG/ORTHO  Daily Treatment Note  NAME: Shelley Maurer  : 1945  MRN: [id number]    Date of Service: 2024    Discharge Recommendations:  Subacute/Skilled Nursing Facility  OT Equipment Recommendations  Equipment Needed: No  Other: defer    Therapy discharge recommendations are subject to collaboration from the patient’s interdisciplinary healthcare team, including MD and case management recommendations.    Barriers to Home Discharge:   [] Steps to access home entry or bed/bath:   [x] Unable to transfer, ambulate, or propel wheelchair household distances without assist   [x] Limited available assist at home upon discharge    [] Patient or family requests d/c to post-acute facility    [x] Poor cognition/safety awareness for d/c to home alone    [] Unable to maintain ordered weight bearing status    [] Patient with salient signs of long-standing immobility   [x] Decreased independence with ADLs   [x] Increased risk for falls   [] Other:    If pt is unable to be seen after this session, please let this note serve as discharge summary.  Please see case management note for discharge disposition.  Thank you.    Patient Diagnosis(es): The primary encounter diagnosis was Altered mental status, unspecified altered mental status type. Diagnoses of Generalized abdominal pain, Wound of buttock, unspecified laterality, initial encounter, Hypokalemia, Hypocalcemia, Prolonged Q-T interval on ECG, Acute cystitis with hematuria, Hypomagnesemia, and Slurred speech were also pertinent to this visit.     Assessment    Assessment: Pt tolerated OT/PT cotx fair. Co-tx collaboration this date to safely meet goals and will have better occupational performance outcomes with in a co-treatment than 1:1 session. She was limited by pain in R hip (d/t fall in hospital this AM) and dizziness (orthostatic hypotension, see vitals section). She required modA-modAx2 for bed mobility,  91%   Comment: Pt orthostatic reporting dizziness only when prompted but starting to see knee buckling and unsteady gait BP upon sitting 87/52 , BP once back to bed 123/61 HR 87        Bed Mobility Training  Bed Mobility Training: Yes  Interventions: Safety awareness training;Manual cues;Verbal cues;Visual cues;Tactile cues  Rolling: Moderate assistance;Additional time  Supine to Sit: Moderate assistance;Additional time;Adaptive equipment (to L)  Sit to Supine: Moderate assistance;Assist X2;Additional time  Balance  Sitting: Intact  Standing: Impaired  Standing - Static: Constant support;Fair (RW)  Standing - Dynamic: Constant support;Poor (RW)  Transfer Training  Transfer Training: Yes  Sit to Stand: Minimum assistance;Moderate assistance;Assist X2;Additional time;Adaptive equipment (RW)  Stand to Sit: Minimum assistance;Moderate assistance;Assist X2;Additional time;Adaptive equipment (RW)  Bed to Chair: Moderate assistance;Assist X1;Assist X2;Additional time (SPT in light of low BP)  Gait  Gait Training: Yes  Overall Level of Assistance: Minimum assistance;Moderate assistance;Assist X2;Additional time;Adaptive equipment (RW)  Distance (ft): 6 Feet  Assistive Device: Walker, rolling;Gait belt  Interventions: Verbal cues;Safety awareness training  Base of Support: Narrowed  Speed/Vi: Slow;Pace decreased (< 100 feet/min)  Step Length: Right shortened;Left shortened  Stance: Left increased;Right decreased  Gait Abnormalities: Path deviations;Scissoring;Decreased step clearance;Trunk sway increased (asisst to advance RLE)       ADL  LE Dressing: Dependent/Total  LE Dressing Skilled Clinical Factors: brief change in supine  Toileting: Dependent/Total  Toileting Skilled Clinical Factors: purewick in place, incontinent of urine on way to bathroom and was unable to make it to toilet. brief change bed level  Functional Mobility: Minimal assistance;Moderate assistance;Increased time to complete;Adaptive

## 2024-05-22 NOTE — CARE COORDINATION
Cm met with pt at bedside. From home alone. PT/OT recs SNF. Pt is agreeable for some rehab. Pt was provided with [place] SNF list and general SNF list. Pt will review with son. Cm will continue to follow.

## 2024-05-22 NOTE — PLAN OF CARE
Problem: Safety - Adult  Goal: Free from fall injury  Outcome: Progressing     Problem: Pain  Goal: Verbalizes/displays adequate comfort level or baseline comfort level  Outcome: Progressing     Problem: Confusion  Goal: Confusion, delirium, dementia, or psychosis is improved or at baseline  Description: INTERVENTIONS:  1. Assess for possible contributors to thought disturbance, including medications, impaired vision or hearing, underlying metabolic abnormalities, dehydration, psychiatric diagnoses, and notify attending LIP  2. [place] high risk fall precautions, as indicated  3. Provide frequent short contacts to provide reality reorientation, refocusing and direction  4. Decrease environmental stimuli, including noise as appropriate  5. Monitor and intervene to maintain adequate nutrition, hydration, elimination, sleep and activity  6. If unable to ensure safety without constant attention obtain sitter and review sitter guidelines with assigned personnel  7. Initiate Psychosocial CNS and Spiritual Care consult, as indicated  Outcome: Progressing

## 2024-05-23 ENCOUNTER — APPOINTMENT (OUTPATIENT)
Dept: GENERAL RADIOLOGY | Age: 79
DRG: 689 | End: 2024-05-23
Payer: MEDICARE

## 2024-05-23 LAB
ALBUMIN SERPL-MCNC: 3.2 G/DL (ref 3.4–5)
ALBUMIN/GLOB SERPL: 1 {RATIO} (ref 1.1–2.2)
ALP SERPL-CCNC: 65 U/L (ref 40–129)
ALT SERPL-CCNC: 12 U/L (ref 10–40)
ANION GAP SERPL CALCULATED.3IONS-SCNC: 13 MMOL/L (ref 3–16)
AST SERPL-CCNC: 11 U/L (ref 15–37)
BASOPHILS # BLD: 0 K/UL (ref 0–0.2)
BASOPHILS NFR BLD: 0.6 %
BILIRUB SERPL-MCNC: 0.3 MG/DL (ref 0–1)
BUN SERPL-MCNC: 7 MG/DL (ref 7–20)
CALCIUM SERPL-MCNC: 7.7 MG/DL (ref 8.3–10.6)
CHLORIDE SERPL-SCNC: 103 MMOL/L (ref 99–110)
CO2 SERPL-SCNC: 20 MMOL/L (ref 21–32)
CREAT SERPL-MCNC: 1 MG/DL (ref 0.6–1.2)
DEPRECATED RDW RBC AUTO: 17.6 % (ref 12.4–15.4)
EKG ATRIAL RATE: 88 BPM
EKG ATRIAL RATE: 98 BPM
EKG DIAGNOSIS: NORMAL
EKG DIAGNOSIS: NORMAL
EKG P AXIS: 24 DEGREES
EKG P AXIS: 8 DEGREES
EKG P-R INTERVAL: 112 MS
EKG P-R INTERVAL: 114 MS
EKG Q-T INTERVAL: 412 MS
EKG Q-T INTERVAL: 444 MS
EKG QRS DURATION: 78 MS
EKG QRS DURATION: 82 MS
EKG QTC CALCULATION (BAZETT): 525 MS
EKG QTC CALCULATION (BAZETT): 537 MS
EKG R AXIS: -12 DEGREES
EKG R AXIS: -13 DEGREES
EKG T AXIS: 120 DEGREES
EKG T AXIS: 70 DEGREES
EKG VENTRICULAR RATE: 88 BPM
EKG VENTRICULAR RATE: 98 BPM
EOSINOPHIL # BLD: 0.1 K/UL (ref 0–0.6)
EOSINOPHIL NFR BLD: 2 %
GFR SERPLBLD CREATININE-BSD FMLA CKD-EPI: 57 ML/MIN/{1.73_M2}
GLUCOSE SERPL-MCNC: 203 MG/DL (ref 70–99)
HCT VFR BLD AUTO: 26.1 % (ref 36–48)
HGB BLD-MCNC: 8.3 G/DL (ref 12–16)
LYMPHOCYTES # BLD: 1 K/UL (ref 1–5.1)
LYMPHOCYTES NFR BLD: 14.8 %
MAGNESIUM SERPL-MCNC: 1.5 MG/DL (ref 1.8–2.4)
MAGNESIUM SERPL-MCNC: 2 MG/DL (ref 1.8–2.4)
MCH RBC QN AUTO: 24.7 PG (ref 26–34)
MCHC RBC AUTO-ENTMCNC: 31.8 G/DL (ref 31–36)
MCV RBC AUTO: 77.6 FL (ref 80–100)
MONOCYTES # BLD: 0.5 K/UL (ref 0–1.3)
MONOCYTES NFR BLD: 7.1 %
NEUTROPHILS # BLD: 5.2 K/UL (ref 1.7–7.7)
NEUTROPHILS NFR BLD: 75.5 %
PLATELET # BLD AUTO: 185 K/UL (ref 135–450)
PMV BLD AUTO: 8.4 FL (ref 5–10.5)
POTASSIUM SERPL-SCNC: 2.9 MMOL/L (ref 3.5–5.1)
POTASSIUM SERPL-SCNC: 3.6 MMOL/L (ref 3.5–5.1)
PROT SERPL-MCNC: 6.4 G/DL (ref 6.4–8.2)
RBC # BLD AUTO: 3.36 M/UL (ref 4–5.2)
SODIUM SERPL-SCNC: 136 MMOL/L (ref 136–145)
TROPONIN, HIGH SENSITIVITY: 33 NG/L (ref 0–14)
WBC # BLD AUTO: 6.9 K/UL (ref 4–11)

## 2024-05-23 PROCEDURE — 71045 X-RAY EXAM CHEST 1 VIEW: CPT

## 2024-05-23 PROCEDURE — 36415 COLL VENOUS BLD VENIPUNCTURE: CPT

## 2024-05-23 PROCEDURE — 1200000000 HC SEMI PRIVATE

## 2024-05-23 PROCEDURE — 2580000003 HC RX 258: Performed by: STUDENT IN AN ORGANIZED HEALTH CARE EDUCATION/TRAINING PROGRAM

## 2024-05-23 PROCEDURE — 84484 ASSAY OF TROPONIN QUANT: CPT

## 2024-05-23 PROCEDURE — 92526 ORAL FUNCTION THERAPY: CPT

## 2024-05-23 PROCEDURE — 94761 N-INVAS EAR/PLS OXIMETRY MLT: CPT

## 2024-05-23 PROCEDURE — 83735 ASSAY OF MAGNESIUM: CPT

## 2024-05-23 PROCEDURE — 92507 TX SP LANG VOICE COMM INDIV: CPT

## 2024-05-23 PROCEDURE — 93010 ELECTROCARDIOGRAM REPORT: CPT | Performed by: INTERNAL MEDICINE

## 2024-05-23 PROCEDURE — 84132 ASSAY OF SERUM POTASSIUM: CPT

## 2024-05-23 PROCEDURE — 6370000000 HC RX 637 (ALT 250 FOR IP): Performed by: STUDENT IN AN ORGANIZED HEALTH CARE EDUCATION/TRAINING PROGRAM

## 2024-05-23 PROCEDURE — 2700000000 HC OXYGEN THERAPY PER DAY

## 2024-05-23 PROCEDURE — 93005 ELECTROCARDIOGRAM TRACING: CPT | Performed by: STUDENT IN AN ORGANIZED HEALTH CARE EDUCATION/TRAINING PROGRAM

## 2024-05-23 PROCEDURE — 80053 COMPREHEN METABOLIC PANEL: CPT

## 2024-05-23 PROCEDURE — 85025 COMPLETE CBC W/AUTO DIFF WBC: CPT

## 2024-05-23 PROCEDURE — 6360000002 HC RX W HCPCS: Performed by: STUDENT IN AN ORGANIZED HEALTH CARE EDUCATION/TRAINING PROGRAM

## 2024-05-23 RX ADMIN — OXYBUTYNIN CHLORIDE 15 MG: 5 TABLET, EXTENDED RELEASE ORAL at 09:29

## 2024-05-23 RX ADMIN — POTASSIUM CHLORIDE 10 MEQ: 7.46 INJECTION, SOLUTION INTRAVENOUS at 11:24

## 2024-05-23 RX ADMIN — SODIUM CHLORIDE, PRESERVATIVE FREE 10 ML: 5 INJECTION INTRAVENOUS at 19:14

## 2024-05-23 RX ADMIN — HYDROMORPHONE HYDROCHLORIDE 0.5 MG: 1 INJECTION, SOLUTION INTRAMUSCULAR; INTRAVENOUS; SUBCUTANEOUS at 22:41

## 2024-05-23 RX ADMIN — ATORVASTATIN CALCIUM 40 MG: 40 TABLET, FILM COATED ORAL at 09:29

## 2024-05-23 RX ADMIN — POTASSIUM CHLORIDE 10 MEQ: 7.46 INJECTION, SOLUTION INTRAVENOUS at 10:11

## 2024-05-23 RX ADMIN — MAGNESIUM SULFATE HEPTAHYDRATE 2000 MG: 40 INJECTION, SOLUTION INTRAVENOUS at 10:18

## 2024-05-23 RX ADMIN — POTASSIUM CHLORIDE 10 MEQ: 7.46 INJECTION, SOLUTION INTRAVENOUS at 14:05

## 2024-05-23 RX ADMIN — POTASSIUM CHLORIDE 10 MEQ: 7.46 INJECTION, SOLUTION INTRAVENOUS at 12:37

## 2024-05-23 RX ADMIN — POTASSIUM CHLORIDE 10 MEQ: 7.46 INJECTION, SOLUTION INTRAVENOUS at 07:33

## 2024-05-23 RX ADMIN — PANTOPRAZOLE SODIUM 40 MG: 40 TABLET, DELAYED RELEASE ORAL at 06:12

## 2024-05-23 RX ADMIN — OXYCODONE 10 MG: 5 TABLET ORAL at 13:50

## 2024-05-23 RX ADMIN — OXYCODONE 10 MG: 5 TABLET ORAL at 03:01

## 2024-05-23 RX ADMIN — ENOXAPARIN SODIUM 40 MG: 100 INJECTION SUBCUTANEOUS at 09:29

## 2024-05-23 RX ADMIN — POTASSIUM CHLORIDE 10 MEQ: 7.46 INJECTION, SOLUTION INTRAVENOUS at 08:56

## 2024-05-23 RX ADMIN — METOPROLOL SUCCINATE 50 MG: 50 TABLET, EXTENDED RELEASE ORAL at 09:29

## 2024-05-23 RX ADMIN — HYDROMORPHONE HYDROCHLORIDE 0.5 MG: 1 INJECTION, SOLUTION INTRAMUSCULAR; INTRAVENOUS; SUBCUTANEOUS at 16:45

## 2024-05-23 RX ADMIN — CEFTRIAXONE SODIUM 1000 MG: 1 INJECTION, POWDER, FOR SOLUTION INTRAMUSCULAR; INTRAVENOUS at 09:28

## 2024-05-23 RX ADMIN — OXYCODONE 10 MG: 5 TABLET ORAL at 20:49

## 2024-05-23 RX ADMIN — FLUTICASONE PROPIONATE 2 SPRAY: 50 SPRAY, METERED NASAL at 09:30

## 2024-05-23 ASSESSMENT — PAIN SCALES - GENERAL
PAINLEVEL_OUTOF10: 10
PAINLEVEL_OUTOF10: 7
PAINLEVEL_OUTOF10: 6
PAINLEVEL_OUTOF10: 3
PAINLEVEL_OUTOF10: 8
PAINLEVEL_OUTOF10: 10
PAINLEVEL_OUTOF10: 10
PAINLEVEL_OUTOF10: 0
PAINLEVEL_OUTOF10: 4
PAINLEVEL_OUTOF10: 3
PAINLEVEL_OUTOF10: 7
PAINLEVEL_OUTOF10: 0

## 2024-05-23 ASSESSMENT — PAIN DESCRIPTION - LOCATION
LOCATION: HIP
LOCATION: HIP
LOCATION: BACK;HIP
LOCATION: HIP
LOCATION: BACK;HIP
LOCATION: HIP
LOCATION: HIP

## 2024-05-23 ASSESSMENT — PAIN DESCRIPTION - ORIENTATION
ORIENTATION: RIGHT
ORIENTATION: RIGHT
ORIENTATION: RIGHT;LOWER
ORIENTATION: RIGHT

## 2024-05-23 ASSESSMENT — PAIN DESCRIPTION - DESCRIPTORS
DESCRIPTORS: ACHING;STABBING
DESCRIPTORS: ACHING

## 2024-05-23 ASSESSMENT — PAIN - FUNCTIONAL ASSESSMENT
PAIN_FUNCTIONAL_ASSESSMENT: PREVENTS OR INTERFERES SOME ACTIVE ACTIVITIES AND ADLS
PAIN_FUNCTIONAL_ASSESSMENT: ACTIVITIES ARE NOT PREVENTED

## 2024-05-23 ASSESSMENT — PAIN DESCRIPTION - PAIN TYPE
TYPE: ACUTE PAIN
TYPE: ACUTE PAIN

## 2024-05-23 NOTE — PROGRESS NOTES
PCA called RN - informed that her oxygen saturation is 86% on room air. RN came inside the room, patient is awake, denies any shortness of breath, complains of pain on her right hip and lower back 10/10, advised to do deep breathing exercise, still oxygen saturation is at 87%. RN hooked patient on oxygen support at 1.5 LPM via nasal cannula, oxygen saturation went up to 91 %.

## 2024-05-23 NOTE — PROGRESS NOTES
Hospital Medicine Progress Note      Date of Admission: 5/20/2024  Hospital Day: 4    Chief Admission Complaint:  AMS     Subjective: Patient is in hospital bed alert and oriented.  No new issues or complaints today.  Patient states he feels \"okay \".  Overnight patient did have some shortness of breath where she required 1.5 L supplemental O2.  Currently on 1 L supplemental O2.    Presenting Admission History:       79 y.o. female who presented to [facility] with altered mental status.  PMHx significant for DM, HLD.       Patient unable to answer questions at this time. No family at bedside.      While in ED patient was found to be afebrile with a temp of 99.3F. Respiratory rate 16 sating at 94% on RA. /96. HR 84. K 2.6. Anion gap 23. Mag 0.80. Calcium 5.6. BNP 4135. Inital trop 72 with repeat of 77. Liver panel unremarable. Hgb 9.7, MCV 76.9.  UA showed UTI, with culture pending. CT-head unremarkable. CT-abd/pelvis showed cystitis. CXR showed possible PNA vs atelectasis. Patient was given calcium gluconate, rocephin, ativan, K, and mag while in the ED. It was at this time patient would be admitted to hospital service for further evaluation and treatment.     Assessment/Plan:      Current Principal Problem:  UTI (urinary tract infection)    UTI - admission U/A c/w acute cystitis.  Infection evidenced by U/A, Cx results and/or signs/sxs of clinical infection despite Cx results.  Started on empiric abx on rocephin pending Cx results.  Changed to DAILY dosing.    Tachycardia  -Etiology unclear at this time, possibly due to pain, possibly due to dehydration  -500 ml bolus ordered  -Will monitor and reassess at later time    Dyspnea  -Etiology unclear at this time  -Chest x-ray unremarkable  -Trop pending  -ECG without ST changes     Anemia - etiology clinically unable to determine, w/out evidence of active bleeding/hemolysis. Asymptomatic w/out indication for transfusion. Follow serial labs - documented and  Major surgery/procedure with associated risk factors:    ----------------------------------------------------------------------  C. Data (any 2)  [] Discussed management of the case with consultants as follows:    [x] Discussed the discharge plan in detail with case mgt including timing/barriers to discharge, need for support services and placement decision   [x] Imaging personally reviewed and interpreted, includes:   [x] Telemetry monitoring w/ atrial tachycardia with PVCs with a rate of 108   [] Data Review (any 3)  [] Collateral history obtained from:    [] All available Consultant notes from yesterday/today were reviewed  [x] All current labs were reviewed and interpreted for clinical significance   [x] Appropriate follow-up labs were ordered      Medications:  Personally reviewed in detail in conjunction w/ labs as documented for evidence of drug toxicity.     Infusion Medications    sodium chloride       Scheduled Medications    atorvastatin  40 mg Oral Daily    metoprolol succinate  50 mg Oral Daily    fluticasone  2 spray Each Nostril Daily    pantoprazole  40 mg Oral QAM AC    oxyBUTYnin  15 mg Oral Daily    lidocaine  1 patch TransDERmal Daily    sodium chloride flush  5-40 mL IntraVENous 2 times per day    enoxaparin  40 mg SubCUTAneous Daily    cefTRIAXone (ROCEPHIN) IV  1,000 mg IntraVENous Q24H     PRN Meds: HYDROmorphone **OR** HYDROmorphone, LORazepam, oxyCODONE **OR** oxyCODONE, sodium chloride flush, sodium chloride, ondansetron **OR** ondansetron, acetaminophen **OR** acetaminophen, polyethylene glycol, potassium chloride **OR** potassium alternative oral replacement **OR** potassium chloride, magnesium sulfate     Labs:  Personally reviewed and interpreted for clinical significance.     Recent Labs     05/21/24 0543 05/22/24 0544 05/23/24  0606   WBC 7.7 7.1 6.9   HGB 9.8* 9.7* 8.3*   HCT 30.9* 31.0* 26.1*    233 185       Recent Labs     05/21/24  0002 05/21/24 0543 05/22/24 0544

## 2024-05-23 NOTE — PLAN OF CARE
Problem: Safety - Adult  Goal: Free from fall injury  5/23/2024 1213 by Elier Wylie LPN  Outcome: Progressing  5/22/2024 2227 by Doris Ignacio RN  Outcome: Progressing     Problem: Discharge Planning  Goal: Discharge to home or other facility with appropriate resources  5/23/2024 1213 by Elier Wylie LPN  Outcome: Progressing  5/22/2024 2227 by Doris Ignacio RN  Outcome: Progressing     Problem: Pain  Goal: Verbalizes/displays adequate comfort level or baseline comfort level  5/23/2024 1213 by Elier Wylie LPN  Outcome: Progressing  5/22/2024 2227 by Doris Ignacio RN  Outcome: Progressing     Problem: Skin/Tissue Integrity  Goal: Absence of new skin breakdown  Description: 1.  Monitor for areas of redness and/or skin breakdown  2.  Assess vascular access sites hourly  3.  Every 4-6 hours minimum:  Change oxygen saturation probe site  4.  Every 4-6 hours:  If on nasal continuous positive airway pressure, respiratory therapy assess nares and determine need for appliance change or resting period.  5/23/2024 1213 by Elier Wylie LPN  Outcome: Progressing  5/22/2024 2227 by Doris Ignacio RN  Outcome: Progressing

## 2024-05-23 NOTE — PLAN OF CARE
Problem: Safety - Adult  Goal: Free from fall injury  5/22/2024 2227 by Doris Ignacio RN  Outcome: Progressing  5/22/2024 1757 by Keisha Guerra RN  Outcome: Progressing     Problem: Discharge Planning  Goal: Discharge to home or other facility with appropriate resources  Outcome: Progressing     Problem: Pain  Goal: Verbalizes/displays adequate comfort level or baseline comfort level  5/22/2024 2227 by Doris Ignacio RN  Outcome: Progressing  5/22/2024 1757 by Keisha Guerra RN  Outcome: Progressing     Problem: Skin/Tissue Integrity  Goal: Absence of new skin breakdown  Description: 1.  Monitor for areas of redness and/or skin breakdown  2.  Assess vascular access sites hourly  3.  Every 4-6 hours minimum:  Change oxygen saturation probe site  4.  Every 4-6 hours:  If on nasal continuous positive airway pressure, respiratory therapy assess nares and determine need for appliance change or resting period.  Outcome: Progressing     Problem: Confusion  Goal: Confusion, delirium, dementia, or psychosis is improved or at baseline  Description: INTERVENTIONS:  1. Assess for possible contributors to thought disturbance, including medications, impaired vision or hearing, underlying metabolic abnormalities, dehydration, psychiatric diagnoses, and notify attending LIP  2. [place] high risk fall precautions, as indicated  3. Provide frequent short contacts to provide reality reorientation, refocusing and direction  4. Decrease environmental stimuli, including noise as appropriate  5. Monitor and intervene to maintain adequate nutrition, hydration, elimination, sleep and activity  6. If unable to ensure safety without constant attention obtain sitter and review sitter guidelines with assigned personnel  7. Initiate Psychosocial CNS and Spiritual Care consult, as indicated  Outcome: Progressing     Problem: Nutrition Deficit:  Goal: Optimize nutritional status  Outcome: Progressing     Problem: Chronic Conditions and  Co-morbidities  Goal: Patient's chronic conditions and co-morbidity symptoms are monitored and maintained or improved  5/22/2024 2227 by Doris Ignacio, RN  Outcome: Progressing  5/22/2024 1757 by Keisha Guerra, RN  Outcome: Progressing

## 2024-05-23 NOTE — PROGRESS NOTES
Speech Language Pathology  Dysphagia Treatment Follow-Up Note  Speech / Language / Cognitive Treatment Follow-Up Note  Facility/Department: [facility] C5 - MED SURG/ORTHO    Recommendations:  Solid Consistency: IDDSI 6 Soft and Bite Sized Solids  Liquid Consistency: IDDSI 0 Thin Liquids  Medication: Meds whole with thin liquids  *PO only when pt alert, supervision with PO d/t AMS  Risk Management: upright for all intake, stay upright for at least 30 mins after intake, small bites/sips, close supervision, oral care 2-3x/day to reduce adverse affects in the event of aspiration, increase physical mobility as able, alternate bites/sips, slow rate of intake, general aspiration precautions, and hold PO and contact SLP if s/s of aspiration or worsening respiratory status develop.   .     NAME:Shelley Maurer  : 1945 (79 y.o.)   MRN: [id number]  ROOM: [place]/0541-01  ADMISSION DATE: 2024  PATIENT DIAGNOSIS(ES): Hypocalcemia [E83.51]  Hypokalemia [E87.6]  Hypomagnesemia [E83.42]  Slurred speech [R47.81]  UTI (urinary tract infection) [N39.0]  Generalized abdominal pain [R10.84]  Prolonged Q-T interval on ECG [R94.31]  Acute cystitis with hematuria [N30.01]  Wound of buttock, unspecified laterality, initial encounter [S31.289A]  Altered mental status, unspecified altered mental status type [R41.82]  Allergies   Allergen Reactions    Food      Shellfish - throat closes       DATE ONSET: 2024    Pain: The patient c/o shoulder pain- repositioned to promote pt comfort, RN alerted       Current Diet: ADULT DIET; Dysphagia - Soft and Bite Sized  ADULT ORAL NUTRITION SUPPLEMENT; Dinner, Lunch; Frozen Oral Supplement      Diet Tolerance:  Patient tolerating current diet level without signs/symptoms of aspiration.    Subjective:   Pt seen in room at bedside with RN permission   Behavior / Cognition: Alert and cooperative   RN Report/Chart Review: Pt desat to 86% on RA over night with pt placed on 1.5 LPM 02 via NC  Patient  Notified Dr. Corona of positive blood cultures from 12/20/18 of gram negative rods. Pt currently on vanco and zosyn. No new orders received. Magdalene Mendieta RN    aspiration 5/5 5/23/2024 : Ongoing, progressing.   Goal 3: The patient/caregiver will demonstrate understanding of compensatory swallow strategies, for improved swallow safety 5/23/2024 : Ongoing, progressing.         Speech/Language/Cog Treatment and Impressions:  Treatment:    Med Management:  Pt unable to report number of medications or names of meds PTA   SLP wrote down 2 of pts current prescriptions, with pt able to determine next appropriate time frame for med (e.g., every 4 hours and taken at 8 AM) on 3/3 opp   Pt able to determine number of meds taken for each med on 2/2 opp   Pt noted with fluctuating attention throughout task secondary to fatigue, able to be redirected back to task     Impressions:    Pt continued to present with AMS and confusion, unable to report on meds PTA but was able to accurately complete time calculations for medications and number of meds take each day for 2 prescriptions. At this time, recommend assistance with medication management and 24 hour supervision at D/C for pt.     Speech/Language/Cog Goals: N/A  Time Frame for Long Term Goals: 7 Days (5/29/24)  Goal 1: Pt will demonstrate improved cognitive linguistic function for safe return to prior level of care. 5/23/2024 : Ongoing, progressing.     Short Term Goals  Time Frame for Short Term Goals: 5 Days (5/26/24)  Goal 1: Pt will complete language related functional activities on 70% opp given min-mod cues 5/23/2024 : Ongoing, progressing.   Goal 2: Pt will improve attention to detail for graded complex information to 70%, for improved recall and retention of newly learned information 5/23/2024 : Ongoing, progressing.         Recommendations:  Solid Consistency: IDDSI 6 Soft and Bite Sized Solids  Liquid Consistency: IDDSI 0 Thin Liquids  Medication: Meds whole with thin liquids  *PO only when pt alert, supervision with PO d/t AMS  Risk Management: upright for all intake, stay upright for at least 30 mins after intake, small

## 2024-05-24 LAB
ALBUMIN SERPL-MCNC: 3.5 G/DL (ref 3.4–5)
ALBUMIN/GLOB SERPL: 1.1 {RATIO} (ref 1.1–2.2)
ALP SERPL-CCNC: 70 U/L (ref 40–129)
ALT SERPL-CCNC: 15 U/L (ref 10–40)
ANION GAP SERPL CALCULATED.3IONS-SCNC: 17 MMOL/L (ref 3–16)
AST SERPL-CCNC: 18 U/L (ref 15–37)
BILIRUB SERPL-MCNC: 0.3 MG/DL (ref 0–1)
BUN SERPL-MCNC: 7 MG/DL (ref 7–20)
CALCIUM SERPL-MCNC: 8.2 MG/DL (ref 8.3–10.6)
CHLORIDE SERPL-SCNC: 96 MMOL/L (ref 99–110)
CO2 SERPL-SCNC: 20 MMOL/L (ref 21–32)
CREAT SERPL-MCNC: 1 MG/DL (ref 0.6–1.2)
DEPRECATED RDW RBC AUTO: 18.3 % (ref 12.4–15.4)
GFR SERPLBLD CREATININE-BSD FMLA CKD-EPI: 57 ML/MIN/{1.73_M2}
GLUCOSE BLD-MCNC: 212 MG/DL (ref 70–99)
GLUCOSE BLD-MCNC: 236 MG/DL (ref 70–99)
GLUCOSE BLD-MCNC: 384 MG/DL (ref 70–99)
GLUCOSE SERPL-MCNC: 266 MG/DL (ref 70–99)
HCT VFR BLD AUTO: 30.3 % (ref 36–48)
HGB BLD-MCNC: 9.7 G/DL (ref 12–16)
MAGNESIUM SERPL-MCNC: 1.6 MG/DL (ref 1.8–2.4)
MCH RBC QN AUTO: 24.9 PG (ref 26–34)
MCHC RBC AUTO-ENTMCNC: 31.8 G/DL (ref 31–36)
MCV RBC AUTO: 78.1 FL (ref 80–100)
PERFORMED ON: ABNORMAL
PLATELET # BLD AUTO: 261 K/UL (ref 135–450)
PMV BLD AUTO: 8.5 FL (ref 5–10.5)
POTASSIUM SERPL-SCNC: 3.2 MMOL/L (ref 3.5–5.1)
PROT SERPL-MCNC: 6.8 G/DL (ref 6.4–8.2)
RBC # BLD AUTO: 3.88 M/UL (ref 4–5.2)
SODIUM SERPL-SCNC: 133 MMOL/L (ref 136–145)
TROPONIN, HIGH SENSITIVITY: 30 NG/L (ref 0–14)
WBC # BLD AUTO: 9.7 K/UL (ref 4–11)

## 2024-05-24 PROCEDURE — 97530 THERAPEUTIC ACTIVITIES: CPT

## 2024-05-24 PROCEDURE — 94761 N-INVAS EAR/PLS OXIMETRY MLT: CPT

## 2024-05-24 PROCEDURE — 36415 COLL VENOUS BLD VENIPUNCTURE: CPT

## 2024-05-24 PROCEDURE — 2700000000 HC OXYGEN THERAPY PER DAY

## 2024-05-24 PROCEDURE — 85027 COMPLETE CBC AUTOMATED: CPT

## 2024-05-24 PROCEDURE — 2580000003 HC RX 258: Performed by: STUDENT IN AN ORGANIZED HEALTH CARE EDUCATION/TRAINING PROGRAM

## 2024-05-24 PROCEDURE — 97110 THERAPEUTIC EXERCISES: CPT

## 2024-05-24 PROCEDURE — 6370000000 HC RX 637 (ALT 250 FOR IP): Performed by: NURSE PRACTITIONER

## 2024-05-24 PROCEDURE — 83735 ASSAY OF MAGNESIUM: CPT

## 2024-05-24 PROCEDURE — 80053 COMPREHEN METABOLIC PANEL: CPT

## 2024-05-24 PROCEDURE — 6360000002 HC RX W HCPCS: Performed by: STUDENT IN AN ORGANIZED HEALTH CARE EDUCATION/TRAINING PROGRAM

## 2024-05-24 PROCEDURE — 6370000000 HC RX 637 (ALT 250 FOR IP): Performed by: STUDENT IN AN ORGANIZED HEALTH CARE EDUCATION/TRAINING PROGRAM

## 2024-05-24 PROCEDURE — 97535 SELF CARE MNGMENT TRAINING: CPT

## 2024-05-24 PROCEDURE — 1200000000 HC SEMI PRIVATE

## 2024-05-24 PROCEDURE — 83036 HEMOGLOBIN GLYCOSYLATED A1C: CPT

## 2024-05-24 PROCEDURE — 84484 ASSAY OF TROPONIN QUANT: CPT

## 2024-05-24 RX ORDER — METOPROLOL SUCCINATE 50 MG/1
100 TABLET, EXTENDED RELEASE ORAL DAILY
Status: DISCONTINUED | OUTPATIENT
Start: 2024-05-25 | End: 2024-05-24

## 2024-05-24 RX ORDER — METOPROLOL SUCCINATE 50 MG/1
50 TABLET, EXTENDED RELEASE ORAL DAILY
Status: DISCONTINUED | OUTPATIENT
Start: 2024-05-25 | End: 2024-05-25

## 2024-05-24 RX ORDER — METOPROLOL SUCCINATE 50 MG/1
50 TABLET, EXTENDED RELEASE ORAL ONCE
Status: DISCONTINUED | OUTPATIENT
Start: 2024-05-24 | End: 2024-05-24

## 2024-05-24 RX ORDER — MECOBALAMIN 5000 MCG
5 TABLET,DISINTEGRATING ORAL NIGHTLY
Status: DISCONTINUED | OUTPATIENT
Start: 2024-05-24 | End: 2024-06-03 | Stop reason: HOSPADM

## 2024-05-24 RX ORDER — INSULIN LISPRO 100 [IU]/ML
0-4 INJECTION, SOLUTION INTRAVENOUS; SUBCUTANEOUS NIGHTLY
Status: DISCONTINUED | OUTPATIENT
Start: 2024-05-24 | End: 2024-05-27 | Stop reason: SDUPTHER

## 2024-05-24 RX ORDER — INSULIN GLARGINE 100 [IU]/ML
0.2 INJECTION, SOLUTION SUBCUTANEOUS NIGHTLY
Status: DISCONTINUED | OUTPATIENT
Start: 2024-05-24 | End: 2024-05-25

## 2024-05-24 RX ORDER — INSULIN LISPRO 100 [IU]/ML
0-8 INJECTION, SOLUTION INTRAVENOUS; SUBCUTANEOUS
Status: DISCONTINUED | OUTPATIENT
Start: 2024-05-24 | End: 2024-05-27 | Stop reason: SDUPTHER

## 2024-05-24 RX ADMIN — INSULIN GLARGINE 12 UNITS: 100 INJECTION, SOLUTION SUBCUTANEOUS at 20:29

## 2024-05-24 RX ADMIN — PANTOPRAZOLE SODIUM 40 MG: 40 TABLET, DELAYED RELEASE ORAL at 06:31

## 2024-05-24 RX ADMIN — OXYBUTYNIN CHLORIDE 15 MG: 5 TABLET, EXTENDED RELEASE ORAL at 09:18

## 2024-05-24 RX ADMIN — ATORVASTATIN CALCIUM 40 MG: 40 TABLET, FILM COATED ORAL at 09:18

## 2024-05-24 RX ADMIN — LORAZEPAM 0.5 MG: 0.5 TABLET ORAL at 21:33

## 2024-05-24 RX ADMIN — POTASSIUM CHLORIDE 40 MEQ: 1500 TABLET, EXTENDED RELEASE ORAL at 13:53

## 2024-05-24 RX ADMIN — METOPROLOL SUCCINATE 50 MG: 50 TABLET, EXTENDED RELEASE ORAL at 09:18

## 2024-05-24 RX ADMIN — OXYCODONE 10 MG: 5 TABLET ORAL at 20:29

## 2024-05-24 RX ADMIN — ENOXAPARIN SODIUM 40 MG: 100 INJECTION SUBCUTANEOUS at 09:18

## 2024-05-24 RX ADMIN — INSULIN LISPRO 8 UNITS: 100 INJECTION, SOLUTION INTRAVENOUS; SUBCUTANEOUS at 13:56

## 2024-05-24 RX ADMIN — Medication 5 MG: at 20:29

## 2024-05-24 RX ADMIN — MAGNESIUM SULFATE HEPTAHYDRATE 2000 MG: 40 INJECTION, SOLUTION INTRAVENOUS at 13:54

## 2024-05-24 RX ADMIN — INSULIN LISPRO 2 UNITS: 100 INJECTION, SOLUTION INTRAVENOUS; SUBCUTANEOUS at 17:22

## 2024-05-24 RX ADMIN — CEFTRIAXONE SODIUM 1000 MG: 1 INJECTION, POWDER, FOR SOLUTION INTRAMUSCULAR; INTRAVENOUS at 09:16

## 2024-05-24 RX ADMIN — SODIUM CHLORIDE, PRESERVATIVE FREE 10 ML: 5 INJECTION INTRAVENOUS at 09:16

## 2024-05-24 RX ADMIN — OXYCODONE 10 MG: 5 TABLET ORAL at 04:16

## 2024-05-24 RX ADMIN — SODIUM CHLORIDE, PRESERVATIVE FREE 10 ML: 5 INJECTION INTRAVENOUS at 19:15

## 2024-05-24 ASSESSMENT — PAIN SCALES - GENERAL
PAINLEVEL_OUTOF10: 10
PAINLEVEL_OUTOF10: 8
PAINLEVEL_OUTOF10: 4
PAINLEVEL_OUTOF10: 0
PAINLEVEL_OUTOF10: 6
PAINLEVEL_OUTOF10: 8

## 2024-05-24 ASSESSMENT — PAIN DESCRIPTION - DESCRIPTORS
DESCRIPTORS: ACHING

## 2024-05-24 ASSESSMENT — PAIN DESCRIPTION - ORIENTATION
ORIENTATION: RIGHT
ORIENTATION: LOWER
ORIENTATION: RIGHT;LOWER
ORIENTATION: RIGHT;LOWER

## 2024-05-24 ASSESSMENT — PAIN DESCRIPTION - LOCATION
LOCATION: BACK
LOCATION: HIP;BACK
LOCATION: BACK;HIP
LOCATION: HIP

## 2024-05-24 ASSESSMENT — PAIN DESCRIPTION - PAIN TYPE
TYPE: ACUTE PAIN
TYPE: ACUTE PAIN

## 2024-05-24 NOTE — PLAN OF CARE
Problem: Safety - Adult  Goal: Free from fall injury  5/23/2024 2143 by Doris Ignacio RN  Outcome: Progressing  5/23/2024 1213 by Elier Wylie LPN  Outcome: Progressing     Problem: Discharge Planning  Goal: Discharge to home or other facility with appropriate resources  5/23/2024 2143 by Doris Ignacio RN  Outcome: Progressing  5/23/2024 1213 by Elier Wylie LPN  Outcome: Progressing     Problem: Pain  Goal: Verbalizes/displays adequate comfort level or baseline comfort level  5/23/2024 2143 by Doris Ignacio RN  Outcome: Progressing  5/23/2024 1213 by Elier Wylie LPN  Outcome: Progressing     Problem: Skin/Tissue Integrity  Goal: Absence of new skin breakdown  Description: 1.  Monitor for areas of redness and/or skin breakdown  2.  Assess vascular access sites hourly  3.  Every 4-6 hours minimum:  Change oxygen saturation probe site  4.  Every 4-6 hours:  If on nasal continuous positive airway pressure, respiratory therapy assess nares and determine need for appliance change or resting period.  5/23/2024 2143 by Doris Ignacio RN  Outcome: Progressing  5/23/2024 1213 by Elier Wylie LPN  Outcome: Progressing     Problem: Confusion  Goal: Confusion, delirium, dementia, or psychosis is improved or at baseline  Description: INTERVENTIONS:  1. Assess for possible contributors to thought disturbance, including medications, impaired vision or hearing, underlying metabolic abnormalities, dehydration, psychiatric diagnoses, and notify attending LIP  2. [place] high risk fall precautions, as indicated  3. Provide frequent short contacts to provide reality reorientation, refocusing and direction  4. Decrease environmental stimuli, including noise as appropriate  5. Monitor and intervene to maintain adequate nutrition, hydration, elimination, sleep and activity  6. If unable to ensure safety without constant attention obtain sitter and review sitter guidelines with assigned personnel  7. Initiate Psychosocial  CNS and Spiritual Care consult, as indicated  5/23/2024 2143 by Doris Ignacio RN  Outcome: Progressing  5/23/2024 1213 by Elier Wylie LPN  Outcome: Progressing     Problem: Nutrition Deficit:  Goal: Optimize nutritional status  5/23/2024 2143 by Doris Ignacio RN  Outcome: Progressing  5/23/2024 1213 by Elier Wylie LPN  Outcome: Progressing     Problem: Chronic Conditions and Co-morbidities  Goal: Patient's chronic conditions and co-morbidity symptoms are monitored and maintained or improved  5/23/2024 2143 by Doris Ignacio RN  Outcome: Progressing  5/23/2024 1213 by Elier Wylie LPN  Outcome: Progressing

## 2024-05-24 NOTE — PROGRESS NOTES
Occupational Therapy  Facility/Department: [facility] C5 - MED SURG/ORTHO  Daily Treatment Note  NAME: Shelley Maurer  : 1945  MRN: [id number]    Date of Service: 2024    Discharge Recommendations:  Subacute/Skilled Nursing Facility       Therapy discharge recommendations are subject to collaboration from the patient’s interdisciplinary healthcare team, including MD and case management recommendations.  Barriers to Home Discharge:   [] Steps to access home entry or bed/bath   [x] Unable to transfer, ambulate, or propel wheelchair household distances without assist   [x] Limited available assist at home upon discharge    [] Patient or family requests d/c to post-acute facility    [x] Poor cognition/safety awareness for d/c to home alone    [] Unable to maintain ordered weight bearing status    [] Patient with salient signs of long-standing immobility   [x] Decreased independence with ADLs   [x] Increased risk for falls   [] Other:    Patient Diagnosis(es): The primary encounter diagnosis was Altered mental status, unspecified altered mental status type. Diagnoses of Generalized abdominal pain, Wound of buttock, unspecified laterality, initial encounter, Hypokalemia, Hypocalcemia, Prolonged Q-T interval on ECG, Acute cystitis with hematuria, Hypomagnesemia, and Slurred speech were also pertinent to this visit.     Assessment    Assessment: Pt tolerated OT treatment fair, very limited by pain with WB. Pt requiring Ax2 for bed mobility and functional transfer training with use of RW. Pt requiring increased time and VCs for oral care seated in chair, pt with large amount of food remaining in mouth, reports she had not eaten since previous night, VCs to rinse multiple times. Co-tx collaboration this date with PT staff to safely progress pt toward goals. Pt will have better occupational performance outcomes within a co-treatment than 1:1 session. Pt functioning below her baseline, continue to recommend SNF at d/c.

## 2024-05-24 NOTE — PROGRESS NOTES
05/24/24 1643   Encounter Summary   Encounter Overview/Reason Volunteer Encounter   Service Provided For Patient   Last Encounter  05/24/24  (Pastoral Care Volunteer)   Assessment/Intervention/Outcome   Intervention Prayer (assurance of)/[place]

## 2024-05-24 NOTE — PROGRESS NOTES
Hospital Medicine Progress Note      Date of Admission: 5/20/2024  Hospital Day: 5    Chief Admission Complaint:  AMS     Subjective:      Presenting Admission History:       79 y.o. female who presented to [facility] with altered mental status.  PMHx significant for DM, HLD.       Patient unable to answer questions at this time. No family at bedside.      While in ED patient was found to be afebrile with a temp of 99.3F. Respiratory rate 16 sating at 94% on RA. /96. HR 84. K 2.6. Anion gap 23. Mag 0.80. Calcium 5.6. BNP 4135. Inital trop 72 with repeat of 77. Liver panel unremarable. Hgb 9.7, MCV 76.9.  UA showed UTI, with culture pending. CT-head unremarkable. CT-abd/pelvis showed cystitis. CXR showed possible PNA vs atelectasis. Patient was given calcium gluconate, rocephin, ativan, K, and mag while in the ED. It was at this time patient would be admitted to hospital service for further evaluation and treatment.     Assessment/Plan:      Current Principal Problem:  UTI (urinary tract infection)    UTI - admission U/A c/w acute cystitis.  Infection evidenced by U/A, Cx results and/or signs/sxs of clinical infection despite Cx results.  Started on empiric abx on rocephin pending Cx results.  Changed to DAILY dosing.    Hyperglycemia   -Etiology unclear at this time; no hx of DM  -A1C pending   -Started SSI medium     Tachycardia  -Etiology unclear at this time, possibly due to pain, possibly due to dehydration  -500 ml bolus provided on 5/23/24; minimal improvement   -Continues to have tachycardia in 120s  on 5/24/24; later in the morning HR < 100   -Will continue to monitor and make medication changes according     Dyspnea  -Etiology unclear at this time  -Chest x-ray unremarkable  -Trop pending  -ECG without ST changes     Anemia - etiology clinically unable to determine, w/out evidence of active bleeding/hemolysis. Asymptomatic w/out indication for transfusion. Follow serial labs - documented and  Recommendations for:  []Home independently  []Home w/ assist  []HHC  [x]SNF  []Acute Rehab    Anticipated Discharge Day/Date:  2-4 days    Anticipated Discharge Location: [x]Home  []HHC  []SNF  []Acute Rehab  []ECF  []LTAC  []Hospice  []Other -      Consults:      IP CONSULT TO DIETITIAN      This patient has a high likelihood of being discharged tomorrow and is appropriate for A1 Discharge Unit in AM pending clinical course overnight: []Yes  [x]No    ------------------------------------------------------------------------------------------------------------------------------------------------------------------------    [facility]  (this section is simply meant as an aid to accurate billing and does not necessarily reflect ongoing patient care which is documented elsewhere in the medical record)    Moderate      Medications:  Personally reviewed in detail in conjunction w/ labs as documented for evidence of drug toxicity.     Infusion Medications    sodium chloride       Scheduled Medications    melatonin  5 mg Oral Nightly    atorvastatin  40 mg Oral Daily    metoprolol succinate  50 mg Oral Daily    fluticasone  2 spray Each Nostril Daily    pantoprazole  40 mg Oral QAM AC    oxyBUTYnin  15 mg Oral Daily    lidocaine  1 patch TransDERmal Daily    sodium chloride flush  5-40 mL IntraVENous 2 times per day    enoxaparin  40 mg SubCUTAneous Daily    cefTRIAXone (ROCEPHIN) IV  1,000 mg IntraVENous Q24H     PRN Meds: HYDROmorphone **OR** HYDROmorphone, LORazepam, oxyCODONE **OR** oxyCODONE, sodium chloride flush, sodium chloride, ondansetron **OR** ondansetron, acetaminophen **OR** acetaminophen, polyethylene glycol, potassium chloride **OR** potassium alternative oral replacement **OR** potassium chloride, magnesium sulfate     Labs:  Personally reviewed and interpreted for clinical significance.     Recent Labs     05/22/24  0544 05/23/24  0606   WBC 7.1 6.9   HGB 9.7* 8.3*   HCT 31.0* 26.1*    185       Recent Labs

## 2024-05-24 NOTE — CARE COORDINATION
CM update; LOS # 4; Patient and son decided on [facility].They have accepted patient and pre-cert is complete. When ready patient can discharge to [facility].Deisy Sosa RN         Patient will need HENS completed and call to be placed to [phone] that will be the receiving nurse.Deisy Sosa RN

## 2024-05-24 NOTE — CARE COORDINATION
Cert is approved through [facility].   Initial Days Approved: 8     CM is aware.   TAJ Larsen

## 2024-05-24 NOTE — PROGRESS NOTES
Comprehensive Nutrition Assessment    Type and Reason for Visit:  Reassess    Nutrition Recommendations/Plan:   Continue soft and bite sized diet and encourage PO intake   Modify magic cups to once daily   RD to order new updated weight   Due to inadequate intake, discussion of palliative care vs nutrition support should be considered. Consult dietitian if tube feeding is desired and consistent with patient's plan of care.  Monitor nutrition adequacy, pertinent labs, bowel habits, wt changes, and clinical progress     Malnutrition Assessment:  Malnutrition Status:  At risk for malnutrition (Comment) (05/24/24 0907)    Context:  Acute Illness     Findings of the 6 clinical characteristics of malnutrition:  Energy Intake:  50% or less of estimated energy requirements for 5 or more days    Nutrition Assessment:    Follow up: Pt continues on soft and bite sized diet, PO intakes 0%, % of meals in EMR. Pt lethargic at time of visit. Reports very poor intakes continue, pt only able to eat a few bites of meals. Will order updated weight. Reports she has not been eating magic cups but will try, RD to modify to once daily. Encouraged protein intake, pt compliant. If intakes do not improve, may need to consider alternative methods of nutrition. Consult RD for recommendations. Continue to encourage PO intake, will continue to monitor.    Nutrition Related Findings:    K+ 3.2, Mg 1.6. Na 133. + BM 5/21. Wound Type: None       Current Nutrition Intake & Therapies:    Average Meal Intake: 26-50%, 1-25%, 0%, 51-75%, %  Average Supplements Intake: 0%  ADULT DIET; Dysphagia - Soft and Bite Sized  ADULT ORAL NUTRITION SUPPLEMENT; Dinner, Lunch; Frozen Oral Supplement    Anthropometric Measures:  Height: 170.2 cm (5' 7\")  Ideal Body Weight (IBW): 135 lbs (61 kg)       Current Body Weight: 60.3 kg (133 lb), 98.5 % IBW. Weight Source: Bed Scale  Current BMI (kg/m2): 20.8        Weight Adjustment For: No Adjustment

## 2024-05-24 NOTE — PROGRESS NOTES
Physical Therapy  Facility/Department: [facility] C5 - MED SURG/ORTHO  Daily Treatment Note  NAME: Shelley Maurer  : 1945  MRN: [id number]    Date of Service: 2024    Discharge Recommendations:  Subacute/Skilled Nursing Facility   PT Equipment Recommendations  Equipment Needed: No  Other: Defer    Therapy discharge recommendations are subject to collaboration from the patient’s interdisciplinary healthcare team, including MD and case management recommendations.     Barriers to Home Discharge:   [] Steps to access home entry or bed/bath:   [x] Unable to transfer, ambulate, or propel wheelchair household distances without assist   [x] Limited available assist at home upon discharge    [] Patient or family requests d/c to post-acute facility    [x] Poor cognition/safety awareness for d/c to home alone    [] Unable to maintain ordered weight bearing status    [] Patient with salient signs of long-standing immobility   [x] Decreased independence with ADLs   [x] Increased risk for falls   [] Other:    Patient Diagnosis(es): The primary encounter diagnosis was Altered mental status, unspecified altered mental status type. Diagnoses of Generalized abdominal pain, Wound of buttock, unspecified laterality, initial encounter, Hypokalemia, Hypocalcemia, Prolonged Q-T interval on ECG, Acute cystitis with hematuria, Hypomagnesemia, and Slurred speech were also pertinent to this visit.    Assessment   Assessment: Pt continues having R hip pain 10/10 w mobility, x ray was negative.  pt required max Ax2 supine to sit, mod/maxA Of 2 STS and unable to amb today. Pt is recommended for con't skilled PT and SNF at D/C.  Activity Tolerance: Patient limited by pain  Equipment Needed: No  Other: Defer     Plan    Physical Therapy Plan  General Plan: 3-5 times per week  Current Treatment Recommendations: Strengthening;ROM;Balance training;Gait training;Stair training;Functional mobility training;Home exercise program;Therapeutic  activities;Safety education & training;Transfer training;Patient/Caregiver education & training;Endurance training     Restrictions  Restrictions/Precautions  Restrictions/Precautions: Fall Risk  Position Activity Restriction  Other position/activity restrictions: up with assistance, purewick, IV     Subjective    Subjective  Subjective: Agreeable with encouragement  Pain: Pt reports pain in R hip, 7/10 at rest, 10/10 with standing trials.  Orientation  Overall Orientation Status: Within Functional Limits  Cognition  Overall Cognitive Status: Exceptions  Following Commands: Follows one step commands with increased time  Attention Span: Attends with cues to redirect  Memory: Decreased recall of recent events  Safety Judgement: Decreased awareness of need for safety;Decreased awareness of need for assistance  Insights: Decreased awareness of deficits  Initiation: Requires cues for some  Sequencing: Requires cues for some     Objective   Vitals  Pulse: (!) 107  BP: (!) 144/78  MAP (Calculated): 100  SpO2: 94 %  O2 Device: Nasal cannula (1L)  Bed Mobility Training  Bed Mobility Training: Yes  Interventions: Safety awareness training;Manual cues;Verbal cues;Visual cues;Tactile cues  Supine to Sit: Maximum assistance;Assist X2 (to R with HOB elevated and increased time)  Scooting: Moderate assistance  Balance  Sitting: Impaired  Sitting - Static: Fair (occasional)  Sitting - Dynamic: Fair (occasional)  Standing: Impaired (RW)  Standing - Static: Constant support;Fair  Standing - Dynamic: Constant support;Poor  Transfer Training  Transfer Training: Yes (RW)  Sit to Stand: Moderate assistance;Maximum assistance;Assist X2  Stand to Sit: Moderate assistance;Assist X2  Bed to Chair: Moderate assistance;Maximum assistance;Assist X2 (RW)     PT Exercises  Exercise Treatment: performed BLE TE 8-12X EACH: AP, GS, LAQ, HS     Safety Devices  Type of Devices: Left in chair;Chair alarm in place;Call light within reach;Nurse

## 2024-05-24 NOTE — PLAN OF CARE
Problem: Safety - Adult  Goal: Free from fall injury  Outcome: Progressing     Problem: Discharge Planning  Goal: Discharge to home or other facility with appropriate resources  Outcome: Progressing     Problem: Pain  Goal: Verbalizes/displays adequate comfort level or baseline comfort level  Outcome: Progressing     Problem: Skin/Tissue Integrity  Goal: Absence of new skin breakdown  Description: 1.  Monitor for areas of redness and/or skin breakdown  2.  Assess vascular access sites hourly  3.  Every 4-6 hours minimum:  Change oxygen saturation probe site  4.  Every 4-6 hours:  If on nasal continuous positive airway pressure, respiratory therapy assess nares and determine need for appliance change or resting period.  Outcome: Progressing     Problem: Confusion  Goal: Confusion, delirium, dementia, or psychosis is improved or at baseline  Description: INTERVENTIONS:  1. Assess for possible contributors to thought disturbance, including medications, impaired vision or hearing, underlying metabolic abnormalities, dehydration, psychiatric diagnoses, and notify attending LIP  2. [place] high risk fall precautions, as indicated  3. Provide frequent short contacts to provide reality reorientation, refocusing and direction  4. Decrease environmental stimuli, including noise as appropriate  5. Monitor and intervene to maintain adequate nutrition, hydration, elimination, sleep and activity  6. If unable to ensure safety without constant attention obtain sitter and review sitter guidelines with assigned personnel  7. Initiate Psychosocial CNS and Spiritual Care consult, as indicated  Outcome: Progressing     Problem: Nutrition Deficit:  Goal: Optimize nutritional status  Outcome: Progressing     Problem: Chronic Conditions and Co-morbidities  Goal: Patient's chronic conditions and co-morbidity symptoms are monitored and maintained or improved  Outcome: Progressing

## 2024-05-25 ENCOUNTER — APPOINTMENT (OUTPATIENT)
Dept: GENERAL RADIOLOGY | Age: 79
DRG: 689 | End: 2024-05-25
Payer: MEDICARE

## 2024-05-25 ENCOUNTER — APPOINTMENT (OUTPATIENT)
Dept: CT IMAGING | Age: 79
DRG: 689 | End: 2024-05-25
Payer: MEDICARE

## 2024-05-25 LAB
ANION GAP SERPL CALCULATED.3IONS-SCNC: 12 MMOL/L (ref 3–16)
BACTERIA URNS QL MICRO: ABNORMAL /HPF
BASE EXCESS BLDA CALC-SCNC: 0 MMOL/L (ref -3–3)
BILIRUB UR QL STRIP.AUTO: NEGATIVE
BUN SERPL-MCNC: 11 MG/DL (ref 7–20)
CALCIUM SERPL-MCNC: 8.3 MG/DL (ref 8.3–10.6)
CHLORIDE SERPL-SCNC: 99 MMOL/L (ref 99–110)
CLARITY UR: CLEAR
CO2 BLDA-SCNC: 24.8 MMOL/L
CO2 SERPL-SCNC: 24 MMOL/L (ref 21–32)
COARSE GRAN CASTS #/AREA URNS LPF: ABNORMAL /LPF (ref 0–2)
COHGB MFR BLDA: 0.3 % (ref 0–1.5)
COLOR UR: YELLOW
CREAT SERPL-MCNC: 1 MG/DL (ref 0.6–1.2)
D-DIMER QUANTITATIVE: 6.55 UG/ML FEU (ref 0–0.6)
DEPRECATED RDW RBC AUTO: 18.7 % (ref 12.4–15.4)
EKG ATRIAL RATE: 80 BPM
EKG DIAGNOSIS: NORMAL
EKG P AXIS: -14 DEGREES
EKG P-R INTERVAL: 92 MS
EKG Q-T INTERVAL: 436 MS
EKG QRS DURATION: 78 MS
EKG QTC CALCULATION (BAZETT): 502 MS
EKG R AXIS: -11 DEGREES
EKG T AXIS: 28 DEGREES
EKG VENTRICULAR RATE: 80 BPM
EPI CELLS #/AREA URNS HPF: ABNORMAL /HPF (ref 0–5)
EST. AVERAGE GLUCOSE BLD GHB EST-MCNC: 137 MG/DL
GFR SERPLBLD CREATININE-BSD FMLA CKD-EPI: 57 ML/MIN/{1.73_M2}
GLUCOSE BLD-MCNC: 215 MG/DL (ref 70–99)
GLUCOSE BLD-MCNC: 219 MG/DL (ref 70–99)
GLUCOSE BLD-MCNC: 228 MG/DL (ref 70–99)
GLUCOSE BLD-MCNC: 261 MG/DL (ref 70–99)
GLUCOSE SERPL-MCNC: 229 MG/DL (ref 70–99)
GLUCOSE UR STRIP.AUTO-MCNC: 100 MG/DL
HBA1C MFR BLD: 6.4 %
HCO3 BLDA-SCNC: 23.7 MMOL/L (ref 21–29)
HCT VFR BLD AUTO: 25.2 % (ref 36–48)
HGB BLD-MCNC: 7.8 G/DL (ref 12–16)
HGB BLDA-MCNC: 9.5 G/DL (ref 12–16)
HGB UR QL STRIP.AUTO: ABNORMAL
KETONES UR STRIP.AUTO-MCNC: NEGATIVE MG/DL
LACTATE BLDV-SCNC: 1.3 MMOL/L (ref 0.4–1.9)
LACTATE BLDV-SCNC: 1.8 MMOL/L (ref 0.4–2)
LACTATE BLDV-SCNC: 2.2 MMOL/L (ref 0.4–1.9)
LEUKOCYTE ESTERASE UR QL STRIP.AUTO: ABNORMAL
MAGNESIUM SERPL-MCNC: 1.8 MG/DL (ref 1.8–2.4)
MCH RBC QN AUTO: 24.5 PG (ref 26–34)
MCHC RBC AUTO-ENTMCNC: 31.1 G/DL (ref 31–36)
MCV RBC AUTO: 78.7 FL (ref 80–100)
METHGB MFR BLDA: 0.2 %
MUCOUS THREADS #/AREA URNS LPF: ABNORMAL /LPF
NITRITE UR QL STRIP.AUTO: NEGATIVE
O2 THERAPY: ABNORMAL
PCO2 BLDA: 34.9 MMHG (ref 35–45)
PERFORMED ON: ABNORMAL
PH BLDA: 7.45 [PH] (ref 7.35–7.45)
PH UR STRIP.AUTO: 5.5 [PH] (ref 5–8)
PLATELET # BLD AUTO: 186 K/UL (ref 135–450)
PMV BLD AUTO: 8.9 FL (ref 5–10.5)
PO2 BLDA: 73.3 MMHG (ref 75–108)
POTASSIUM SERPL-SCNC: 3.6 MMOL/L (ref 3.5–5.1)
PROCALCITONIN SERPL IA-MCNC: 0.12 NG/ML (ref 0–0.15)
PROT UR STRIP.AUTO-MCNC: ABNORMAL MG/DL
RBC # BLD AUTO: 3.2 M/UL (ref 4–5.2)
RBC #/AREA URNS HPF: ABNORMAL /HPF (ref 0–4)
RENAL EPI CELLS #/AREA UR COMP ASSIST: ABNORMAL /HPF (ref 0–1)
SAO2 % BLDA: 94.8 %
SODIUM SERPL-SCNC: 135 MMOL/L (ref 136–145)
SP GR UR STRIP.AUTO: 1.02 (ref 1–1.03)
TROPONIN, HIGH SENSITIVITY: 23 NG/L (ref 0–14)
UA COMPLETE W REFLEX CULTURE PNL UR: ABNORMAL
UA DIPSTICK W REFLEX MICRO PNL UR: YES
URN SPEC COLLECT METH UR: ABNORMAL
UROBILINOGEN UR STRIP-ACNC: 0.2 E.U./DL
WBC # BLD AUTO: 6.4 K/UL (ref 4–11)
WBC #/AREA URNS HPF: ABNORMAL /HPF (ref 0–5)

## 2024-05-25 PROCEDURE — 83735 ASSAY OF MAGNESIUM: CPT

## 2024-05-25 PROCEDURE — 6370000000 HC RX 637 (ALT 250 FOR IP): Performed by: STUDENT IN AN ORGANIZED HEALTH CARE EDUCATION/TRAINING PROGRAM

## 2024-05-25 PROCEDURE — 2580000003 HC RX 258: Performed by: INTERNAL MEDICINE

## 2024-05-25 PROCEDURE — 6370000000 HC RX 637 (ALT 250 FOR IP): Performed by: NURSE PRACTITIONER

## 2024-05-25 PROCEDURE — 85379 FIBRIN DEGRADATION QUANT: CPT

## 2024-05-25 PROCEDURE — 71045 X-RAY EXAM CHEST 1 VIEW: CPT

## 2024-05-25 PROCEDURE — 2580000003 HC RX 258: Performed by: NURSE PRACTITIONER

## 2024-05-25 PROCEDURE — 93005 ELECTROCARDIOGRAM TRACING: CPT | Performed by: STUDENT IN AN ORGANIZED HEALTH CARE EDUCATION/TRAINING PROGRAM

## 2024-05-25 PROCEDURE — 6360000002 HC RX W HCPCS: Performed by: STUDENT IN AN ORGANIZED HEALTH CARE EDUCATION/TRAINING PROGRAM

## 2024-05-25 PROCEDURE — 83605 ASSAY OF LACTIC ACID: CPT

## 2024-05-25 PROCEDURE — 2500000003 HC RX 250 WO HCPCS: Performed by: STUDENT IN AN ORGANIZED HEALTH CARE EDUCATION/TRAINING PROGRAM

## 2024-05-25 PROCEDURE — 36415 COLL VENOUS BLD VENIPUNCTURE: CPT

## 2024-05-25 PROCEDURE — 80048 BASIC METABOLIC PNL TOTAL CA: CPT

## 2024-05-25 PROCEDURE — 36600 WITHDRAWAL OF ARTERIAL BLOOD: CPT

## 2024-05-25 PROCEDURE — 6360000002 HC RX W HCPCS: Performed by: NURSE PRACTITIONER

## 2024-05-25 PROCEDURE — 85027 COMPLETE CBC AUTOMATED: CPT

## 2024-05-25 PROCEDURE — 94761 N-INVAS EAR/PLS OXIMETRY MLT: CPT

## 2024-05-25 PROCEDURE — 82803 BLOOD GASES ANY COMBINATION: CPT

## 2024-05-25 PROCEDURE — 84145 PROCALCITONIN (PCT): CPT

## 2024-05-25 PROCEDURE — 87040 BLOOD CULTURE FOR BACTERIA: CPT

## 2024-05-25 PROCEDURE — 74176 CT ABD & PELVIS W/O CONTRAST: CPT

## 2024-05-25 PROCEDURE — 81001 URINALYSIS AUTO W/SCOPE: CPT

## 2024-05-25 PROCEDURE — 1200000000 HC SEMI PRIVATE

## 2024-05-25 PROCEDURE — 2580000003 HC RX 258: Performed by: STUDENT IN AN ORGANIZED HEALTH CARE EDUCATION/TRAINING PROGRAM

## 2024-05-25 PROCEDURE — 93010 ELECTROCARDIOGRAM REPORT: CPT | Performed by: INTERNAL MEDICINE

## 2024-05-25 PROCEDURE — 6360000002 HC RX W HCPCS: Performed by: INTERNAL MEDICINE

## 2024-05-25 PROCEDURE — 84484 ASSAY OF TROPONIN QUANT: CPT

## 2024-05-25 PROCEDURE — 2700000000 HC OXYGEN THERAPY PER DAY

## 2024-05-25 RX ORDER — 0.9 % SODIUM CHLORIDE 0.9 %
30 INTRAVENOUS SOLUTION INTRAVENOUS ONCE
Status: COMPLETED | OUTPATIENT
Start: 2024-05-25 | End: 2024-05-25

## 2024-05-25 RX ORDER — BISACODYL 10 MG
10 SUPPOSITORY, RECTAL RECTAL DAILY
Status: DISCONTINUED | OUTPATIENT
Start: 2024-05-25 | End: 2024-06-03 | Stop reason: HOSPADM

## 2024-05-25 RX ORDER — METOPROLOL TARTRATE 1 MG/ML
10 INJECTION, SOLUTION INTRAVENOUS ONCE
Status: COMPLETED | OUTPATIENT
Start: 2024-05-25 | End: 2024-05-25

## 2024-05-25 RX ORDER — GUAIFENESIN/DEXTROMETHORPHAN 100-10MG/5
5 SYRUP ORAL EVERY 4 HOURS PRN
Status: DISCONTINUED | OUTPATIENT
Start: 2024-05-25 | End: 2024-06-03 | Stop reason: HOSPADM

## 2024-05-25 RX ORDER — POLYETHYLENE GLYCOL 3350 17 G/17G
34 POWDER, FOR SOLUTION ORAL DAILY
Status: DISCONTINUED | OUTPATIENT
Start: 2024-05-25 | End: 2024-05-27

## 2024-05-25 RX ORDER — KETOROLAC TROMETHAMINE 30 MG/ML
15 INJECTION, SOLUTION INTRAMUSCULAR; INTRAVENOUS ONCE
Status: COMPLETED | OUTPATIENT
Start: 2024-05-25 | End: 2024-05-25

## 2024-05-25 RX ORDER — SODIUM CHLORIDE 9 MG/ML
INJECTION, SOLUTION INTRAVENOUS CONTINUOUS
Status: DISCONTINUED | OUTPATIENT
Start: 2024-05-25 | End: 2024-05-31

## 2024-05-25 RX ORDER — METOPROLOL SUCCINATE 50 MG/1
100 TABLET, EXTENDED RELEASE ORAL DAILY
Status: DISCONTINUED | OUTPATIENT
Start: 2024-05-26 | End: 2024-06-03 | Stop reason: HOSPADM

## 2024-05-25 RX ORDER — INSULIN GLARGINE 100 [IU]/ML
0.25 INJECTION, SOLUTION SUBCUTANEOUS NIGHTLY
Status: DISCONTINUED | OUTPATIENT
Start: 2024-05-25 | End: 2024-06-03 | Stop reason: HOSPADM

## 2024-05-25 RX ADMIN — OXYCODONE 10 MG: 5 TABLET ORAL at 12:31

## 2024-05-25 RX ADMIN — SODIUM CHLORIDE 1818 ML: 9 INJECTION, SOLUTION INTRAVENOUS at 20:32

## 2024-05-25 RX ADMIN — OXYBUTYNIN CHLORIDE 15 MG: 5 TABLET, EXTENDED RELEASE ORAL at 09:18

## 2024-05-25 RX ADMIN — ENOXAPARIN SODIUM 40 MG: 100 INJECTION SUBCUTANEOUS at 09:17

## 2024-05-25 RX ADMIN — Medication 5 MG: at 19:56

## 2024-05-25 RX ADMIN — PIPERACILLIN SODIUM AND TAZOBACTAM SODIUM 4500 MG: 4; .5 INJECTION, POWDER, LYOPHILIZED, FOR SOLUTION INTRAVENOUS at 22:51

## 2024-05-25 RX ADMIN — INSULIN LISPRO 2 UNITS: 100 INJECTION, SOLUTION INTRAVENOUS; SUBCUTANEOUS at 12:35

## 2024-05-25 RX ADMIN — KETOROLAC TROMETHAMINE 15 MG: 30 INJECTION, SOLUTION INTRAMUSCULAR at 19:56

## 2024-05-25 RX ADMIN — PANTOPRAZOLE SODIUM 40 MG: 40 TABLET, DELAYED RELEASE ORAL at 06:04

## 2024-05-25 RX ADMIN — ACETAMINOPHEN 650 MG: 325 TABLET ORAL at 18:34

## 2024-05-25 RX ADMIN — METOPROLOL SUCCINATE 50 MG: 50 TABLET, EXTENDED RELEASE ORAL at 09:18

## 2024-05-25 RX ADMIN — POLYETHYLENE GLYCOL 3350 34 G: 17 POWDER, FOR SOLUTION ORAL at 12:42

## 2024-05-25 RX ADMIN — MEROPENEM 1000 MG: 1 INJECTION, POWDER, FOR SOLUTION INTRAVENOUS at 20:03

## 2024-05-25 RX ADMIN — INSULIN LISPRO 4 UNITS: 100 INJECTION, SOLUTION INTRAVENOUS; SUBCUTANEOUS at 17:33

## 2024-05-25 RX ADMIN — METOPROLOL TARTRATE 10 MG: 5 INJECTION INTRAVENOUS at 14:56

## 2024-05-25 RX ADMIN — ATORVASTATIN CALCIUM 40 MG: 40 TABLET, FILM COATED ORAL at 09:18

## 2024-05-25 RX ADMIN — INSULIN GLARGINE 15 UNITS: 100 INJECTION, SOLUTION SUBCUTANEOUS at 19:58

## 2024-05-25 RX ADMIN — SODIUM CHLORIDE: 9 INJECTION, SOLUTION INTRAVENOUS at 14:53

## 2024-05-25 RX ADMIN — FLUTICASONE PROPIONATE 2 SPRAY: 50 SPRAY, METERED NASAL at 09:22

## 2024-05-25 RX ADMIN — INSULIN LISPRO 2 UNITS: 100 INJECTION, SOLUTION INTRAVENOUS; SUBCUTANEOUS at 09:37

## 2024-05-25 RX ADMIN — OXYCODONE 10 MG: 5 TABLET ORAL at 04:55

## 2024-05-25 RX ADMIN — BISACODYL 10 MG: 10 SUPPOSITORY RECTAL at 12:30

## 2024-05-25 RX ADMIN — SODIUM CHLORIDE, PRESERVATIVE FREE 10 ML: 5 INJECTION INTRAVENOUS at 09:18

## 2024-05-25 ASSESSMENT — PAIN SCALES - WONG BAKER: WONGBAKER_NUMERICALRESPONSE: NO HURT

## 2024-05-25 ASSESSMENT — PAIN DESCRIPTION - DESCRIPTORS
DESCRIPTORS: ACHING

## 2024-05-25 ASSESSMENT — PAIN DESCRIPTION - LOCATION
LOCATION: HIP
LOCATION: ABDOMEN
LOCATION: HIP

## 2024-05-25 ASSESSMENT — PAIN DESCRIPTION - ORIENTATION
ORIENTATION: LOWER
ORIENTATION: RIGHT
ORIENTATION: RIGHT

## 2024-05-25 ASSESSMENT — PAIN SCALES - GENERAL
PAINLEVEL_OUTOF10: 5
PAINLEVEL_OUTOF10: 0
PAINLEVEL_OUTOF10: 0
PAINLEVEL_OUTOF10: 10
PAINLEVEL_OUTOF10: 0
PAINLEVEL_OUTOF10: 0
PAINLEVEL_OUTOF10: 7
PAINLEVEL_OUTOF10: 5

## 2024-05-25 ASSESSMENT — PAIN - FUNCTIONAL ASSESSMENT: PAIN_FUNCTIONAL_ASSESSMENT: ACTIVITIES ARE NOT PREVENTED

## 2024-05-25 NOTE — PROGRESS NOTES
Patient arrived back to C5 from CT scan, O2 found to be in 80s - increased oxygen up to 6L NC recovered to 93%; found to be febrile 101.3 tylenol given, HR continues to be elevated at 115. Dr. Roger GALLEGO crosscovering and Aracely matt RN made aware. Oxygen decreased to 5L NC stating at 91-92%. New orders for blood cultures and IV zosyn.

## 2024-05-25 NOTE — PROGRESS NOTES
Hospital Medicine Progress Note      Date of Admission: 5/20/2024  Hospital Day: 6    Chief Admission Complaint:  AMS     Subjective:  Patient is in hospital bed alert and oriented. Continued to have issues with hip and shoulder pain since falling. Stated constipation.     Informed by nursing staff that patient had issues eating this AM sating appropriately on RA. Heart rate > 110s. Abdominal pain. No BM since 5/20/24. Falling SPO2 in 80s, pt put on 1L via NC. Informed again about increased abdominal pain and worsening SOB. Labs and imaging ordered accordingly.     Presenting Admission History:       79 y.o. female who presented to [facility] with altered mental status.  PMHx significant for DM, HLD.       Patient unable to answer questions at this time. No family at bedside.      While in ED patient was found to be afebrile with a temp of 99.3F. Respiratory rate 16 sating at 94% on RA. /96. HR 84. K 2.6. Anion gap 23. Mag 0.80. Calcium 5.6. BNP 4135. Inital trop 72 with repeat of 77. Liver panel unremarable. Hgb 9.7, MCV 76.9.  UA showed UTI, with culture pending. CT-head unremarkable. CT-abd/pelvis showed cystitis. CXR showed possible PNA vs atelectasis. Patient was given calcium gluconate, rocephin, ativan, K, and mag while in the ED. It was at this time patient would be admitted to hospital service for further evaluation and treatment.     Assessment/Plan:      Current Principal Problem:  UTI (urinary tract infection)    UTI - admission U/A c/w acute cystitis.  Infection evidenced by U/A, Cx results and/or signs/sxs of clinical infection despite Cx results.  Started on empiric abx on rocephin pending Cx results.  Changed to DAILY dosing.  Resolved    Tachycardia  -Etiology unclear at this time, possibly due to pain, possibly due to dehydration-500 ml bolus provided on 5/23/24; minimal improvement -Continues to have tachycardia in 120s  on 5/24/24; later in the morning HR < 100 -Will continue to

## 2024-05-25 NOTE — PROGRESS NOTES
Dr. Dario Segovia DO and Aracely matt RN made aware that pt is slightly tachy in 110s, SPO2 90% RA; also DO/RN made aware that pt possibly aspirated on breakfast. DO advised to continue to monitor.     1014: DO/charge RN made aware that HR continues to be in the 110s, SPO2 dropped into the 80s placed on 1L NC recovered to 92%. Also, pt complaining of abd pain. New orders for suppository and glycolax.     1422: DO/charge RN made aware that pt HR continues to be in the 110s. SPO2 dropping into 80s, oxygen increased to 6L recovered to 93%. DO returned call, placed new orders. See orders. Oxygen decreased to 1L NC SPO2 at 92%.

## 2024-05-25 NOTE — PLAN OF CARE
Problem: Safety - Adult  Goal: Free from fall injury  5/24/2024 2042 by Doris Ignacio RN  Outcome: HH/HSPC Not Progressing  5/24/2024 1147 by Sarmad Delacruz RN  Outcome: Progressing     Problem: Discharge Planning  Goal: Discharge to home or other facility with appropriate resources  5/24/2024 2042 by Doris Ignacio RN  Outcome: HH/HSPC Not Progressing  5/24/2024 1147 by Sarmad Delacruz RN  Outcome: Progressing     Problem: Pain  Goal: Verbalizes/displays adequate comfort level or baseline comfort level  5/24/2024 2042 by Doris Ignacio RN  Outcome: HH/HSPC Not Progressing  5/24/2024 1147 by Sarmad Delacruz RN  Outcome: Progressing     Problem: Skin/Tissue Integrity  Goal: Absence of new skin breakdown  Description: 1.  Monitor for areas of redness and/or skin breakdown  2.  Assess vascular access sites hourly  3.  Every 4-6 hours minimum:  Change oxygen saturation probe site  4.  Every 4-6 hours:  If on nasal continuous positive airway pressure, respiratory therapy assess nares and determine need for appliance change or resting period.  5/24/2024 2042 by Doris Ignacio RN  Outcome: HH/HSPC Not Progressing  5/24/2024 1147 by Sarmad Delacruz RN  Outcome: Progressing     Problem: Confusion  Goal: Confusion, delirium, dementia, or psychosis is improved or at baseline  Description: INTERVENTIONS:  1. Assess for possible contributors to thought disturbance, including medications, impaired vision or hearing, underlying metabolic abnormalities, dehydration, psychiatric diagnoses, and notify attending LIP  2. [place] high risk fall precautions, as indicated  3. Provide frequent short contacts to provide reality reorientation, refocusing and direction  4. Decrease environmental stimuli, including noise as appropriate  5. Monitor and intervene to maintain adequate nutrition, hydration, elimination, sleep and activity  6. If unable to ensure safety without constant attention obtain sitter and review sitter guidelines with  assigned personnel  7. Initiate Psychosocial CNS and Spiritual Care consult, as indicated  5/24/2024 2042 by Doris Ignacio RN  Outcome: HH/HSPC Not Progressing  5/24/2024 1147 by Sarmad Delacruz RN  Outcome: Progressing     Problem: Nutrition Deficit:  Goal: Optimize nutritional status  5/24/2024 2042 by Doris Ignacio RN  Outcome: HH/HSPC Not Progressing  Flowsheets (Taken 5/24/2024 1229 by Griselda Marie, MS, RD, LD)  Nutrient intake appropriate for improving, restoring, or maintaining nutritional needs:   Assess nutritional status and recommend course of action   Recommend appropriate diets, oral nutritional supplements, and vitamin/mineral supplements   Monitor oral intake, labs, and treatment plans  5/24/2024 1147 by Sarmad Delacruz RN  Outcome: Progressing     Problem: Chronic Conditions and Co-morbidities  Goal: Patient's chronic conditions and co-morbidity symptoms are monitored and maintained or improved  5/24/2024 2042 by Doris Ignacio RN  Outcome: HH/HSPC Not Progressing  5/24/2024 1147 by Sarmad Delacruz RN  Outcome: Progressing

## 2024-05-25 NOTE — PLAN OF CARE
Problem: Safety - Adult  Goal: Free from fall injury  Outcome: Progressing     Problem: Discharge Planning  Goal: Discharge to home or other facility with appropriate resources  Outcome: Progressing     Problem: Pain  Goal: Verbalizes/displays adequate comfort level or baseline comfort level  Outcome: Progressing     Problem: Skin/Tissue Integrity  Goal: Absence of new skin breakdown  Description: 1.  Monitor for areas of redness and/or skin breakdown  2.  Assess vascular access sites hourly  3.  Every 4-6 hours minimum:  Change oxygen saturation probe site  4.  Every 4-6 hours:  If on nasal continuous positive airway pressure, respiratory therapy assess nares and determine need for appliance change or resting period.  Outcome: Progressing     Problem: Confusion  Goal: Confusion, delirium, dementia, or psychosis is improved or at baseline  Description: INTERVENTIONS:  1. Assess for possible contributors to thought disturbance, including medications, impaired vision or hearing, underlying metabolic abnormalities, dehydration, psychiatric diagnoses, and notify attending LIP  2. [place] high risk fall precautions, as indicated  3. Provide frequent short contacts to provide reality reorientation, refocusing and direction  4. Decrease environmental stimuli, including noise as appropriate  5. Monitor and intervene to maintain adequate nutrition, hydration, elimination, sleep and activity  6. If unable to ensure safety without constant attention obtain sitter and review sitter guidelines with assigned personnel  7. Initiate Psychosocial CNS and Spiritual Care consult, as indicated  Outcome: Progressing     Problem: Nutrition Deficit:  Goal: Optimize nutritional status  Outcome: Progressing     Problem: Chronic Conditions and Co-morbidities  Goal: Patient's chronic conditions and co-morbidity symptoms are monitored and maintained or improved  Outcome: Progressing

## 2024-05-26 ENCOUNTER — APPOINTMENT (OUTPATIENT)
Dept: CT IMAGING | Age: 79
DRG: 689 | End: 2024-05-26
Payer: MEDICARE

## 2024-05-26 LAB
ANION GAP SERPL CALCULATED.3IONS-SCNC: 10 MMOL/L (ref 3–16)
BUN SERPL-MCNC: 10 MG/DL (ref 7–20)
CALCIUM SERPL-MCNC: 7.7 MG/DL (ref 8.3–10.6)
CHLORIDE SERPL-SCNC: 103 MMOL/L (ref 99–110)
CO2 SERPL-SCNC: 22 MMOL/L (ref 21–32)
CREAT SERPL-MCNC: 1.1 MG/DL (ref 0.6–1.2)
DEPRECATED RDW RBC AUTO: 18.2 % (ref 12.4–15.4)
GFR SERPLBLD CREATININE-BSD FMLA CKD-EPI: 50 ML/MIN/{1.73_M2}
GLUCOSE BLD-MCNC: 176 MG/DL (ref 70–99)
GLUCOSE BLD-MCNC: 187 MG/DL (ref 70–99)
GLUCOSE BLD-MCNC: 197 MG/DL (ref 70–99)
GLUCOSE BLD-MCNC: 321 MG/DL (ref 70–99)
GLUCOSE SERPL-MCNC: 167 MG/DL (ref 70–99)
HCT VFR BLD AUTO: 22.3 % (ref 36–48)
HCT VFR BLD AUTO: 22.5 % (ref 36–48)
HGB BLD-MCNC: 7.1 G/DL (ref 12–16)
HGB BLD-MCNC: 7.2 G/DL (ref 12–16)
MAGNESIUM SERPL-MCNC: 1.3 MG/DL (ref 1.8–2.4)
MCH RBC QN AUTO: 24.8 PG (ref 26–34)
MCHC RBC AUTO-ENTMCNC: 31.8 G/DL (ref 31–36)
MCV RBC AUTO: 78 FL (ref 80–100)
PERFORMED ON: ABNORMAL
PLATELET # BLD AUTO: 161 K/UL (ref 135–450)
PMV BLD AUTO: 8.3 FL (ref 5–10.5)
POTASSIUM SERPL-SCNC: 3.5 MMOL/L (ref 3.5–5.1)
RBC # BLD AUTO: 2.86 M/UL (ref 4–5.2)
SODIUM SERPL-SCNC: 135 MMOL/L (ref 136–145)
WBC # BLD AUTO: 9 K/UL (ref 4–11)

## 2024-05-26 PROCEDURE — 85027 COMPLETE CBC AUTOMATED: CPT

## 2024-05-26 PROCEDURE — 1200000000 HC SEMI PRIVATE

## 2024-05-26 PROCEDURE — 83735 ASSAY OF MAGNESIUM: CPT

## 2024-05-26 PROCEDURE — 6360000002 HC RX W HCPCS: Performed by: INTERNAL MEDICINE

## 2024-05-26 PROCEDURE — 6360000002 HC RX W HCPCS: Performed by: STUDENT IN AN ORGANIZED HEALTH CARE EDUCATION/TRAINING PROGRAM

## 2024-05-26 PROCEDURE — 2700000000 HC OXYGEN THERAPY PER DAY

## 2024-05-26 PROCEDURE — 85014 HEMATOCRIT: CPT

## 2024-05-26 PROCEDURE — 2580000003 HC RX 258: Performed by: STUDENT IN AN ORGANIZED HEALTH CARE EDUCATION/TRAINING PROGRAM

## 2024-05-26 PROCEDURE — 71260 CT THORAX DX C+: CPT

## 2024-05-26 PROCEDURE — 6370000000 HC RX 637 (ALT 250 FOR IP): Performed by: STUDENT IN AN ORGANIZED HEALTH CARE EDUCATION/TRAINING PROGRAM

## 2024-05-26 PROCEDURE — 6360000004 HC RX CONTRAST MEDICATION: Performed by: STUDENT IN AN ORGANIZED HEALTH CARE EDUCATION/TRAINING PROGRAM

## 2024-05-26 PROCEDURE — 85018 HEMOGLOBIN: CPT

## 2024-05-26 PROCEDURE — 2580000003 HC RX 258: Performed by: INTERNAL MEDICINE

## 2024-05-26 PROCEDURE — 94761 N-INVAS EAR/PLS OXIMETRY MLT: CPT

## 2024-05-26 PROCEDURE — 80048 BASIC METABOLIC PNL TOTAL CA: CPT

## 2024-05-26 PROCEDURE — 36415 COLL VENOUS BLD VENIPUNCTURE: CPT

## 2024-05-26 PROCEDURE — 6370000000 HC RX 637 (ALT 250 FOR IP): Performed by: NURSE PRACTITIONER

## 2024-05-26 RX ADMIN — METOPROLOL SUCCINATE 100 MG: 50 TABLET, EXTENDED RELEASE ORAL at 11:48

## 2024-05-26 RX ADMIN — PIPERACILLIN AND TAZOBACTAM 3375 MG: 3; .375 INJECTION, POWDER, LYOPHILIZED, FOR SOLUTION INTRAVENOUS at 22:38

## 2024-05-26 RX ADMIN — POLYETHYLENE GLYCOL 3350 34 G: 17 POWDER, FOR SOLUTION ORAL at 11:48

## 2024-05-26 RX ADMIN — HYDROMORPHONE HYDROCHLORIDE 0.5 MG: 1 INJECTION, SOLUTION INTRAMUSCULAR; INTRAVENOUS; SUBCUTANEOUS at 22:04

## 2024-05-26 RX ADMIN — INSULIN LISPRO 4 UNITS: 100 INJECTION, SOLUTION INTRAVENOUS; SUBCUTANEOUS at 22:00

## 2024-05-26 RX ADMIN — IOPAMIDOL 75 ML: 755 INJECTION, SOLUTION INTRAVENOUS at 11:12

## 2024-05-26 RX ADMIN — SODIUM CHLORIDE: 9 INJECTION, SOLUTION INTRAVENOUS at 22:37

## 2024-05-26 RX ADMIN — MAGNESIUM SULFATE HEPTAHYDRATE 2000 MG: 40 INJECTION, SOLUTION INTRAVENOUS at 15:38

## 2024-05-26 RX ADMIN — FLUTICASONE PROPIONATE 2 SPRAY: 50 SPRAY, METERED NASAL at 11:49

## 2024-05-26 RX ADMIN — ATORVASTATIN CALCIUM 40 MG: 40 TABLET, FILM COATED ORAL at 11:48

## 2024-05-26 RX ADMIN — PIPERACILLIN AND TAZOBACTAM 3375 MG: 3; .375 INJECTION, POWDER, LYOPHILIZED, FOR SOLUTION INTRAVENOUS at 11:40

## 2024-05-26 RX ADMIN — INSULIN GLARGINE 15 UNITS: 100 INJECTION, SOLUTION SUBCUTANEOUS at 22:00

## 2024-05-26 RX ADMIN — OXYCODONE 5 MG: 5 TABLET ORAL at 12:13

## 2024-05-26 RX ADMIN — SODIUM CHLORIDE, PRESERVATIVE FREE 10 ML: 5 INJECTION INTRAVENOUS at 22:09

## 2024-05-26 RX ADMIN — Medication 5 MG: at 22:10

## 2024-05-26 RX ADMIN — ENOXAPARIN SODIUM 40 MG: 100 INJECTION SUBCUTANEOUS at 11:47

## 2024-05-26 RX ADMIN — BISACODYL 10 MG: 10 SUPPOSITORY RECTAL at 11:49

## 2024-05-26 RX ADMIN — MAGNESIUM SULFATE HEPTAHYDRATE 2000 MG: 40 INJECTION, SOLUTION INTRAVENOUS at 18:29

## 2024-05-26 RX ADMIN — OXYBUTYNIN CHLORIDE 15 MG: 5 TABLET, EXTENDED RELEASE ORAL at 11:48

## 2024-05-26 ASSESSMENT — PAIN SCALES - GENERAL
PAINLEVEL_OUTOF10: 7
PAINLEVEL_OUTOF10: 6
PAINLEVEL_OUTOF10: 7

## 2024-05-26 ASSESSMENT — PAIN DESCRIPTION - LOCATION: LOCATION: BACK

## 2024-05-26 NOTE — CONSULTS
Day # 1/7  Dx: HAP  Competed 5 days of rocephin for E.Coli UTI  Scr: 1.0/CrCl ~ 42ml/min  Will start at 3.375gm q8h extended infusion   Kathy Todd/Perry. 5/25/24 8:22 PM EDT     High Blood Pressure: Care Instructions  Overview    It's normal for blood pressure to go up and down throughout the day. But if it stays up, you have high blood pressure. Another name for high blood pressure is hypertension. Despite what a lot of people think, high blood pressure usually doesn't cause headaches or make you feel dizzy or lightheaded. It usually has no symptoms. But it does increase your risk of stroke, heart attack, and other problems. You and your doctor will talk about your risks of these problems based on your blood pressure. Your doctor will give you a goal for your blood pressure. Your goal will be based on your health and your age. Lifestyle changes, such as eating healthy and being active, are always important to help lower blood pressure. You might also take medicine to reach your blood pressure goal.  Follow-up care is a key part of your treatment and safety. Be sure to make and go to all appointments, and call your doctor if you are having problems. It's also a good idea to know your test results and keep a list of the medicines you take. How can you care for yourself at home? Medical treatment  · If you stop taking your medicine, your blood pressure will go back up. You may take one or more types of medicine to lower your blood pressure. Be safe with medicines. Take your medicine exactly as prescribed. Call your doctor if you think you are having a problem with your medicine. · Talk to your doctor before you start taking aspirin every day. Aspirin can help certain people lower their risk of a heart attack or stroke. But taking aspirin isn't right for everyone, because it can cause serious bleeding. · See your doctor regularly. You may need to see the doctor more often at first or until your blood pressure comes down. · If you are taking blood pressure medicine, talk to your doctor before you take decongestants or anti-inflammatory medicine, such as ibuprofen.  Some of these medicines can raise blood pressure. · Learn how to check your blood pressure at home. Lifestyle changes  · Stay at a healthy weight. This is especially important if you put on weight around the waist. Losing even 10 pounds can help you lower your blood pressure. · If your doctor recommends it, get more exercise. Walking is a good choice. Bit by bit, increase the amount you walk every day. Try for at least 30 minutes on most days of the week. You also may want to swim, bike, or do other activities. · Avoid or limit alcohol. Talk to your doctor about whether you can drink any alcohol. · Try to limit how much sodium you eat to less than 2,300 milligrams (mg) a day. Your doctor may ask you to try to eat less than 1,500 mg a day. · Eat plenty of fruits (such as bananas and oranges), vegetables, legumes, whole grains, and low-fat dairy products. · Lower the amount of saturated fat in your diet. Saturated fat is found in animal products such as milk, cheese, and meat. Limiting these foods may help you lose weight and also lower your risk for heart disease. · Do not smoke. Smoking increases your risk for heart attack and stroke. If you need help quitting, talk to your doctor about stop-smoking programs and medicines. These can increase your chances of quitting for good. When should you call for help? Call 911 anytime you think you may need emergency care. This may mean having symptoms that suggest that your blood pressure is causing a serious heart or blood vessel problem. Your blood pressure may be over 180/120.   For example, call 911 if:    · You have symptoms of a heart attack. These may include:  ? Chest pain or pressure, or a strange feeling in the chest.  ? Sweating. ? Shortness of breath. ? Nausea or vomiting. ? Pain, pressure, or a strange feeling in the back, neck, jaw, or upper belly or in one or both shoulders or arms. ? Lightheadedness or sudden weakness.   ? A fast or irregular heartbeat.     · You have symptoms of a stroke. These may include:  ? Sudden numbness, tingling, weakness, or loss of movement in your face, arm, or leg, especially on only one side of your body. ? Sudden vision changes. ? Sudden trouble speaking. ? Sudden confusion or trouble understanding simple statements. ? Sudden problems with walking or balance. ? A sudden, severe headache that is different from past headaches.     · You have severe back or belly pain.    Do not wait until your blood pressure comes down on its own. Get help right away.   Call your doctor now or seek immediate care if:    · Your blood pressure is much higher than normal (such as 180/120 or higher), but you don't have symptoms.     · You think high blood pressure is causing symptoms, such as:  ? Severe headache.  ? Blurry vision.    Watch closely for changes in your health, and be sure to contact your doctor if:    · Your blood pressure measures higher than your doctor recommends at least 2 times. That means the top number is higher or the bottom number is higher, or both.     · You think you may be having side effects from your blood pressure medicine. Where can you learn more? Go to http://maureen-mahesh.info/. Enter T374 in the search box to learn more about \"High Blood Pressure: Care Instructions. \"  Current as of: July 22, 2018  Content Version: 12.1  © 5894-3066 [employer]. Care instructions adapted under license by [employer] (which disclaims liability or warranty for this information). If you have questions about a medical condition or this instruction, always ask your healthcare professional. [place] [address] any warranty or liability for your use of this information.

## 2024-05-26 NOTE — PROGRESS NOTES
chloride       Scheduled Medications    polyethylene glycol  34 g Oral Daily    bisacodyl  10 mg Rectal Daily    metoprolol succinate  100 mg Oral Daily    insulin glargine  0.25 Units/kg SubCUTAneous Nightly    piperacillin-tazobactam  3,375 mg IntraVENous Q8H    melatonin  5 mg Oral Nightly    insulin lispro  0-8 Units SubCUTAneous TID WC    insulin lispro  0-4 Units SubCUTAneous Nightly    atorvastatin  40 mg Oral Daily    fluticasone  2 spray Each Nostril Daily    pantoprazole  40 mg Oral QAM AC    oxyBUTYnin  15 mg Oral Daily    lidocaine  1 patch TransDERmal Daily    sodium chloride flush  5-40 mL IntraVENous 2 times per day    enoxaparin  40 mg SubCUTAneous Daily     PRN Meds: guaiFENesin-dextromethorphan, HYDROmorphone **OR** HYDROmorphone, LORazepam, oxyCODONE **OR** oxyCODONE, sodium chloride flush, sodium chloride, ondansetron **OR** ondansetron, acetaminophen **OR** acetaminophen, polyethylene glycol, potassium chloride **OR** potassium alternative oral replacement **OR** potassium chloride, magnesium sulfate     Labs:  Personally reviewed and interpreted for clinical significance.     Recent Labs     05/24/24  1013 05/25/24  0626 05/26/24  1057   WBC 9.7 6.4 9.0   HGB 9.7* 7.8* 7.1*   HCT 30.3* 25.2* 22.3*    186 161       Recent Labs     05/24/24  1013 05/25/24  0626 05/26/24  1056   * 135* 135*   K 3.2* 3.6 3.5   CL 96* 99 103   CO2 20* 24 22   BUN 7 11 10   CREATININE 1.0 1.0 1.1   CALCIUM 8.2* 8.3 7.7*   MG 1.60* 1.80 1.30*       Recent Labs     05/24/24  1013 05/25/24  1558   TROPHS 30* 23*       Recent Labs     05/24/24  1013   LABA1C 6.4       Recent Labs     05/24/24  1013   AST 18   ALT 15   BILITOT 0.3   ALKPHOS 70       Recent Labs     05/25/24  1558   LACTA 1.8       Urine Cultures:   Lab Results   Component Value Date/Time    LABURIN >100,000 CFU/ml 05/20/2024 04:18 PM     Blood Cultures:   No results found for: \"BC\"  No results found for: \"BLOODCULT2\"  Organism:   Lab Results    Component Value Date/Time    ORG Escherichia coli 05/20/2024 04:18 PM         Dario Segovia DO

## 2024-05-26 NOTE — PROGRESS NOTES
Provider notified d/t abnormal vitals, see orders, CN aware. Lactic elevated, MRSA nasal swab sent, fluid bolus started, IV ABX started, IV toradol for fever given per order. Ice water provided. Call light in reach.

## 2024-05-26 NOTE — PLAN OF CARE
Problem: Safety - Adult  Goal: Free from fall injury  5/25/2024 1526 by Elier Wylie LPN  Outcome: Progressing     Problem: Discharge Planning  Goal: Discharge to home or other facility with appropriate resources  5/25/2024 2042 by KAIDEN LAO  Outcome: Not Progressing  5/25/2024 1526 by Elier Wylie LPN  Outcome: Progressing

## 2024-05-27 LAB
ABO + RH BLD: NORMAL
ANION GAP SERPL CALCULATED.3IONS-SCNC: 12 MMOL/L (ref 3–16)
BLD GP AB SCN SERPL QL: NORMAL
BLOOD BANK DISPENSE STATUS: NORMAL
BLOOD BANK PRODUCT CODE: NORMAL
BPU ID: NORMAL
BUN SERPL-MCNC: 9 MG/DL (ref 7–20)
CALCIUM SERPL-MCNC: 7.4 MG/DL (ref 8.3–10.6)
CHLORIDE SERPL-SCNC: 100 MMOL/L (ref 99–110)
CO2 SERPL-SCNC: 23 MMOL/L (ref 21–32)
CREAT SERPL-MCNC: 0.9 MG/DL (ref 0.6–1.2)
DEPRECATED RDW RBC AUTO: 18.1 % (ref 12.4–15.4)
DESCRIPTION BLOOD BANK: NORMAL
GFR SERPLBLD CREATININE-BSD FMLA CKD-EPI: 64 ML/MIN/{1.73_M2}
GLUCOSE BLD-MCNC: 143 MG/DL (ref 70–99)
GLUCOSE BLD-MCNC: 154 MG/DL (ref 70–99)
GLUCOSE BLD-MCNC: 160 MG/DL (ref 70–99)
GLUCOSE BLD-MCNC: 287 MG/DL (ref 70–99)
GLUCOSE SERPL-MCNC: 147 MG/DL (ref 70–99)
HCT VFR BLD AUTO: 20.3 % (ref 36–48)
HCT VFR BLD AUTO: 26.8 % (ref 36–48)
HEMOCCULT SP1 STL QL: NORMAL
HGB BLD-MCNC: 6.5 G/DL (ref 12–16)
HGB BLD-MCNC: 8.7 G/DL (ref 12–16)
MAGNESIUM SERPL-MCNC: 1.9 MG/DL (ref 1.8–2.4)
MCH RBC QN AUTO: 25 PG (ref 26–34)
MCHC RBC AUTO-ENTMCNC: 32.2 G/DL (ref 31–36)
MCV RBC AUTO: 77.7 FL (ref 80–100)
PERFORMED ON: ABNORMAL
PLATELET # BLD AUTO: 157 K/UL (ref 135–450)
PMV BLD AUTO: 9 FL (ref 5–10.5)
POTASSIUM SERPL-SCNC: 3 MMOL/L (ref 3.5–5.1)
POTASSIUM SERPL-SCNC: 3.9 MMOL/L (ref 3.5–5.1)
RBC # BLD AUTO: 2.61 M/UL (ref 4–5.2)
SODIUM SERPL-SCNC: 135 MMOL/L (ref 136–145)
WBC # BLD AUTO: 7.8 K/UL (ref 4–11)

## 2024-05-27 PROCEDURE — 86901 BLOOD TYPING SEROLOGIC RH(D): CPT

## 2024-05-27 PROCEDURE — 6360000002 HC RX W HCPCS: Performed by: STUDENT IN AN ORGANIZED HEALTH CARE EDUCATION/TRAINING PROGRAM

## 2024-05-27 PROCEDURE — 6360000002 HC RX W HCPCS: Performed by: INTERNAL MEDICINE

## 2024-05-27 PROCEDURE — 86900 BLOOD TYPING SEROLOGIC ABO: CPT

## 2024-05-27 PROCEDURE — 85014 HEMATOCRIT: CPT

## 2024-05-27 PROCEDURE — 97129 THER IVNTJ 1ST 15 MIN: CPT

## 2024-05-27 PROCEDURE — 2580000003 HC RX 258: Performed by: INTERNAL MEDICINE

## 2024-05-27 PROCEDURE — C9113 INJ PANTOPRAZOLE SODIUM, VIA: HCPCS | Performed by: STUDENT IN AN ORGANIZED HEALTH CARE EDUCATION/TRAINING PROGRAM

## 2024-05-27 PROCEDURE — 83735 ASSAY OF MAGNESIUM: CPT

## 2024-05-27 PROCEDURE — 86850 RBC ANTIBODY SCREEN: CPT

## 2024-05-27 PROCEDURE — 1200000000 HC SEMI PRIVATE

## 2024-05-27 PROCEDURE — 2700000000 HC OXYGEN THERAPY PER DAY

## 2024-05-27 PROCEDURE — 86923 COMPATIBILITY TEST ELECTRIC: CPT

## 2024-05-27 PROCEDURE — 6370000000 HC RX 637 (ALT 250 FOR IP): Performed by: NURSE PRACTITIONER

## 2024-05-27 PROCEDURE — 83036 HEMOGLOBIN GLYCOSYLATED A1C: CPT

## 2024-05-27 PROCEDURE — 2580000003 HC RX 258: Performed by: STUDENT IN AN ORGANIZED HEALTH CARE EDUCATION/TRAINING PROGRAM

## 2024-05-27 PROCEDURE — P9016 RBC LEUKOCYTES REDUCED: HCPCS

## 2024-05-27 PROCEDURE — 82270 OCCULT BLOOD FECES: CPT

## 2024-05-27 PROCEDURE — 6370000000 HC RX 637 (ALT 250 FOR IP): Performed by: STUDENT IN AN ORGANIZED HEALTH CARE EDUCATION/TRAINING PROGRAM

## 2024-05-27 PROCEDURE — 36430 TRANSFUSION BLD/BLD COMPNT: CPT

## 2024-05-27 PROCEDURE — 94761 N-INVAS EAR/PLS OXIMETRY MLT: CPT

## 2024-05-27 PROCEDURE — 85027 COMPLETE CBC AUTOMATED: CPT

## 2024-05-27 PROCEDURE — 36415 COLL VENOUS BLD VENIPUNCTURE: CPT

## 2024-05-27 PROCEDURE — 30243N1 TRANSFUSION OF NONAUTOLOGOUS RED BLOOD CELLS INTO CENTRAL VEIN, PERCUTANEOUS APPROACH: ICD-10-PCS | Performed by: HOSPITALIST

## 2024-05-27 PROCEDURE — 85018 HEMOGLOBIN: CPT

## 2024-05-27 PROCEDURE — 80048 BASIC METABOLIC PNL TOTAL CA: CPT

## 2024-05-27 PROCEDURE — 84132 ASSAY OF SERUM POTASSIUM: CPT

## 2024-05-27 RX ORDER — INSULIN LISPRO 100 [IU]/ML
0-4 INJECTION, SOLUTION INTRAVENOUS; SUBCUTANEOUS
Status: DISCONTINUED | OUTPATIENT
Start: 2024-05-28 | End: 2024-06-03 | Stop reason: HOSPADM

## 2024-05-27 RX ORDER — INSULIN LISPRO 100 [IU]/ML
0-4 INJECTION, SOLUTION INTRAVENOUS; SUBCUTANEOUS NIGHTLY
Status: DISCONTINUED | OUTPATIENT
Start: 2024-05-27 | End: 2024-06-03 | Stop reason: HOSPADM

## 2024-05-27 RX ORDER — DEXTROSE MONOHYDRATE 100 MG/ML
INJECTION, SOLUTION INTRAVENOUS CONTINUOUS PRN
Status: DISCONTINUED | OUTPATIENT
Start: 2024-05-27 | End: 2024-06-03 | Stop reason: HOSPADM

## 2024-05-27 RX ORDER — INSULIN LISPRO 100 [IU]/ML
0-8 INJECTION, SOLUTION INTRAVENOUS; SUBCUTANEOUS EVERY 4 HOURS
Status: DISCONTINUED | OUTPATIENT
Start: 2024-05-27 | End: 2024-05-27

## 2024-05-27 RX ORDER — SODIUM CHLORIDE 9 MG/ML
INJECTION, SOLUTION INTRAVENOUS PRN
Status: DISCONTINUED | OUTPATIENT
Start: 2024-05-27 | End: 2024-06-03 | Stop reason: HOSPADM

## 2024-05-27 RX ORDER — GLUCAGON 1 MG/ML
1 KIT INJECTION PRN
Status: DISCONTINUED | OUTPATIENT
Start: 2024-05-27 | End: 2024-06-03 | Stop reason: HOSPADM

## 2024-05-27 RX ORDER — PANTOPRAZOLE SODIUM 40 MG/10ML
40 INJECTION, POWDER, LYOPHILIZED, FOR SOLUTION INTRAVENOUS 2 TIMES DAILY
Status: DISCONTINUED | OUTPATIENT
Start: 2024-05-27 | End: 2024-06-03 | Stop reason: HOSPADM

## 2024-05-27 RX ORDER — POLYETHYLENE GLYCOL 3350 17 G/17G
17 POWDER, FOR SOLUTION ORAL DAILY
Status: DISCONTINUED | OUTPATIENT
Start: 2024-05-28 | End: 2024-06-03 | Stop reason: HOSPADM

## 2024-05-27 RX ADMIN — PIPERACILLIN AND TAZOBACTAM 3375 MG: 3; .375 INJECTION, POWDER, LYOPHILIZED, FOR SOLUTION INTRAVENOUS at 20:52

## 2024-05-27 RX ADMIN — Medication 10 ML: at 05:46

## 2024-05-27 RX ADMIN — PANTOPRAZOLE SODIUM 40 MG: 40 INJECTION, POWDER, FOR SOLUTION INTRAVENOUS at 20:36

## 2024-05-27 RX ADMIN — POTASSIUM CHLORIDE 10 MEQ: 7.46 INJECTION, SOLUTION INTRAVENOUS at 10:29

## 2024-05-27 RX ADMIN — SODIUM CHLORIDE: 9 INJECTION, SOLUTION INTRAVENOUS at 05:49

## 2024-05-27 RX ADMIN — BISACODYL 10 MG: 10 SUPPOSITORY RECTAL at 10:13

## 2024-05-27 RX ADMIN — SODIUM CHLORIDE, PRESERVATIVE FREE 10 ML: 5 INJECTION INTRAVENOUS at 10:18

## 2024-05-27 RX ADMIN — PIPERACILLIN AND TAZOBACTAM 3375 MG: 3; .375 INJECTION, POWDER, LYOPHILIZED, FOR SOLUTION INTRAVENOUS at 05:50

## 2024-05-27 RX ADMIN — HYDROMORPHONE HYDROCHLORIDE 0.5 MG: 1 INJECTION, SOLUTION INTRAMUSCULAR; INTRAVENOUS; SUBCUTANEOUS at 05:04

## 2024-05-27 RX ADMIN — LORAZEPAM 0.5 MG: 0.5 TABLET ORAL at 22:35

## 2024-05-27 RX ADMIN — Medication 5 MG: at 22:35

## 2024-05-27 RX ADMIN — SODIUM CHLORIDE, PRESERVATIVE FREE 10 ML: 5 INJECTION INTRAVENOUS at 22:31

## 2024-05-27 RX ADMIN — SODIUM CHLORIDE: 9 INJECTION, SOLUTION INTRAVENOUS at 05:48

## 2024-05-27 RX ADMIN — PIPERACILLIN AND TAZOBACTAM 3375 MG: 3; .375 INJECTION, POWDER, LYOPHILIZED, FOR SOLUTION INTRAVENOUS at 11:52

## 2024-05-27 RX ADMIN — POTASSIUM CHLORIDE 10 MEQ: 7.46 INJECTION, SOLUTION INTRAVENOUS at 12:42

## 2024-05-27 RX ADMIN — POLYETHYLENE GLYCOL 3350 34 G: 17 POWDER, FOR SOLUTION ORAL at 10:13

## 2024-05-27 RX ADMIN — INSULIN LISPRO 4 UNITS: 100 INJECTION, SOLUTION INTRAVENOUS; SUBCUTANEOUS at 11:46

## 2024-05-27 RX ADMIN — POTASSIUM CHLORIDE 10 MEQ: 7.46 INJECTION, SOLUTION INTRAVENOUS at 16:28

## 2024-05-27 RX ADMIN — LORAZEPAM 0.5 MG: 0.5 TABLET ORAL at 05:52

## 2024-05-27 RX ADMIN — METOPROLOL SUCCINATE 100 MG: 50 TABLET, EXTENDED RELEASE ORAL at 10:13

## 2024-05-27 RX ADMIN — POTASSIUM CHLORIDE 10 MEQ: 7.46 INJECTION, SOLUTION INTRAVENOUS at 11:41

## 2024-05-27 RX ADMIN — FLUTICASONE PROPIONATE 2 SPRAY: 50 SPRAY, METERED NASAL at 10:17

## 2024-05-27 RX ADMIN — INSULIN GLARGINE 15 UNITS: 100 INJECTION, SOLUTION SUBCUTANEOUS at 20:35

## 2024-05-27 RX ADMIN — ATORVASTATIN CALCIUM 40 MG: 40 TABLET, FILM COATED ORAL at 10:13

## 2024-05-27 RX ADMIN — OXYBUTYNIN CHLORIDE 15 MG: 5 TABLET, EXTENDED RELEASE ORAL at 10:12

## 2024-05-27 RX ADMIN — ACETAMINOPHEN 650 MG: 325 TABLET ORAL at 05:52

## 2024-05-27 RX ADMIN — PANTOPRAZOLE SODIUM 40 MG: 40 TABLET, DELAYED RELEASE ORAL at 05:52

## 2024-05-27 RX ADMIN — POTASSIUM CHLORIDE 10 MEQ: 7.46 INJECTION, SOLUTION INTRAVENOUS at 13:55

## 2024-05-27 RX ADMIN — SODIUM CHLORIDE: 9 INJECTION, SOLUTION INTRAVENOUS at 13:55

## 2024-05-27 RX ADMIN — POTASSIUM CHLORIDE 10 MEQ: 7.46 INJECTION, SOLUTION INTRAVENOUS at 15:16

## 2024-05-27 ASSESSMENT — PAIN SCALES - GENERAL
PAINLEVEL_OUTOF10: 0
PAINLEVEL_OUTOF10: 7

## 2024-05-27 ASSESSMENT — PAIN DESCRIPTION - LOCATION: LOCATION: BACK

## 2024-05-27 NOTE — PLAN OF CARE
Problem: Safety - Adult  Goal: Free from fall injury  Outcome: Progressing     Problem: Discharge Planning  Goal: Discharge to home or other facility with appropriate resources  Outcome: Progressing     Problem: Pain  Goal: Verbalizes/displays adequate comfort level or baseline comfort level  Outcome: Progressing     Problem: Skin/Tissue Integrity  Goal: Absence of new skin breakdown  Description: 1.  Monitor for areas of redness and/or skin breakdown  2.  Assess vascular access sites hourly  3.  Every 4-6 hours minimum:  Change oxygen saturation probe site  4.  Every 4-6 hours:  If on nasal continuous positive airway pressure, respiratory therapy assess nares and determine need for appliance change or resting period.  Outcome: Progressing     Problem: Confusion  Goal: Confusion, delirium, dementia, or psychosis is improved or at baseline  Description: INTERVENTIONS:  1. Assess for possible contributors to thought disturbance, including medications, impaired vision or hearing, underlying metabolic abnormalities, dehydration, psychiatric diagnoses, and notify attending LIP  2. [place] high risk fall precautions, as indicated  3. Provide frequent short contacts to provide reality reorientation, refocusing and direction  4. Decrease environmental stimuli, including noise as appropriate  5. Monitor and intervene to maintain adequate nutrition, hydration, elimination, sleep and activity  6. If unable to ensure safety without constant attention obtain sitter and review sitter guidelines with assigned personnel  7. Initiate Psychosocial CNS and Spiritual Care consult, as indicated  Outcome: Progressing     Problem: Nutrition Deficit:  Goal: Optimize nutritional status  Outcome: Progressing     Problem: Chronic Conditions and Co-morbidities  Goal: Patient's chronic conditions and co-morbidity symptoms are monitored and maintained or improved  Outcome: Progressing

## 2024-05-27 NOTE — PLAN OF CARE
Problem: Safety - Adult  Goal: Free from fall injury  5/27/2024 1117 by Sarmad Delacruz RN  Outcome: Progressing  5/26/2024 2245 by Noelle Shaikh LPN  Outcome: Progressing     Problem: Discharge Planning  Goal: Discharge to home or other facility with appropriate resources  5/27/2024 1117 by Sarmad Delacruz RN  Outcome: Progressing  5/26/2024 2245 by Noelle Shaikh LPN  Outcome: Progressing     Problem: Pain  Goal: Verbalizes/displays adequate comfort level or baseline comfort level  5/27/2024 1117 by Sarmad Delacruz RN  Outcome: Progressing  5/26/2024 2245 by Noelle Shaikh LPN  Outcome: Progressing     Problem: Skin/Tissue Integrity  Goal: Absence of new skin breakdown  Description: 1.  Monitor for areas of redness and/or skin breakdown  2.  Assess vascular access sites hourly  3.  Every 4-6 hours minimum:  Change oxygen saturation probe site  4.  Every 4-6 hours:  If on nasal continuous positive airway pressure, respiratory therapy assess nares and determine need for appliance change or resting period.  5/27/2024 1117 by Sarmad Delacruz RN  Outcome: Progressing  5/26/2024 2245 by Noelle Shaikh LPN  Outcome: Progressing     Problem: Confusion  Goal: Confusion, delirium, dementia, or psychosis is improved or at baseline  Description: INTERVENTIONS:  1. Assess for possible contributors to thought disturbance, including medications, impaired vision or hearing, underlying metabolic abnormalities, dehydration, psychiatric diagnoses, and notify attending LIP  2. [place] high risk fall precautions, as indicated  3. Provide frequent short contacts to provide reality reorientation, refocusing and direction  4. Decrease environmental stimuli, including noise as appropriate  5. Monitor and intervene to maintain adequate nutrition, hydration, elimination, sleep and activity  6. If unable to ensure safety without constant attention obtain sitter and review sitter guidelines with assigned personnel  7. Initiate Psychosocial  CNS and Spiritual Care consult, as indicated  5/27/2024 1117 by Sarmad Delacruz RN  Outcome: Progressing  5/26/2024 2245 by Noelle Shaikh LPN  Outcome: Progressing     Problem: Nutrition Deficit:  Goal: Optimize nutritional status  5/27/2024 1117 by Sarmad Delacruz RN  Outcome: Progressing  5/26/2024 2245 by Noelle Shaikh LPN  Outcome: Progressing     Problem: Chronic Conditions and Co-morbidities  Goal: Patient's chronic conditions and co-morbidity symptoms are monitored and maintained or improved  5/27/2024 1117 by Sarmad Delacruz RN  Outcome: Progressing  5/26/2024 2245 by Noelle Shaikh LPN  Outcome: Progressing

## 2024-05-27 NOTE — PROGRESS NOTES
SLP ALL NOTES    Speech Language Pathology  Speech / Language / Cognitive Treatment/Follow-Up Note  Facility/Department: [place] [address] - MED SURG/ORTHO    NAME:Shelley Maurer  : 1945 (79 y.o.)   MRN: [id number]  ROOM: [facility]/0541-  ADMISSION DATE: 2024  PATIENT DIAGNOSIS(ES): Hypocalcemia [E83.51]  Hypokalemia [E87.6]  Hypomagnesemia [E83.42]  Slurred speech [R47.81]  UTI (urinary tract infection) [N39.0]  Generalized abdominal pain [R10.84]  Prolonged Q-T interval on ECG [R94.31]  Acute cystitis with hematuria [N30.01]  Wound of buttock, unspecified laterality, initial encounter [S31.674D]  Altered mental status, unspecified altered mental status type [R41.82]  Allergies   Allergen Reactions    Food      Shellfish - throat closes       DATE ONSET: 2024    Pain: The patient does not complain of pain this date, but sttaes she cannot remember how she slept and is confused at baseline.       Current Diet: ADULT DIET; Dysphagia - Soft and Bite Sized  ADULT ORAL NUTRITION SUPPLEMENT; Dinner; Frozen Oral Supplement      Diet Tolerance:  Patient tolerating current diet level without signs/symptoms of aspiration. Pt is being followed by ST for speech / lang / cog only.     Speech / Language / Cognitive Treatment and Impressions:  Subjective: Pt seen in room at bedside with RN permission.   Behavior / Cognition: Pt cog function limited at baseline.  RN Report/Chart Review: RN stated that pt is doing well and is going to [facility] [place] hen medically able to d/c from hosp.     Treatment:  SLP provided cog-linguistic tx this date. Pt able to answer orientation questions as follow: correct- place, month, year, and self. Pt unable to state date or day of week. Pt difficulty following conversation and responding appropriately at times due to attention and fatigue, but repetition and cues assisted pt in responding. Ex: pt difficulty responding where she lived before the hospital vs. If she is currently at

## 2024-05-27 NOTE — PROGRESS NOTES
Hospital Medicine Progress Note      Date of Admission: 5/20/2024  Hospital Day: 8    Chief Admission Complaint:  AMS     Subjective: Patient is in hospital bed alert and oriented.  No new issues complaints today.  Patient states that she feels fatigued and weak at this time.  Denies any pain at this time. Spoke with nursing staff and was informed of no acute issues overnight.    Presenting Admission History:       79 y.o. female who presented to [facility] with altered mental status.  PMHx significant for DM, HLD.       Patient unable to answer questions at this time. No family at bedside.      While in ED patient was found to be afebrile with a temp of 99.3F. Respiratory rate 16 sating at 94% on RA. /96. HR 84. K 2.6. Anion gap 23. Mag 0.80. Calcium 5.6. BNP 4135. Inital trop 72 with repeat of 77. Liver panel unremarable. Hgb 9.7, MCV 76.9.  UA showed UTI, with culture pending. CT-head unremarkable. CT-abd/pelvis showed cystitis. CXR showed possible PNA vs atelectasis. Patient was given calcium gluconate, rocephin, ativan, K, and mag while in the ED. It was at this time patient would be admitted to hospital service for further evaluation and treatment.     Assessment/Plan:      Current Principal Problem:  UTI (urinary tract infection)    Acute Respiratory Failure - w/ hypoxia.  Presence of clinical respiratory distress w/ dyspnea/SOB.  Supplemental O2 and wean as tolerated.   -Etiology unclear at this time  -Initial Chest x-ray unremarkable-Trop pending-ECG without ST changes  -On 5/25/24 CXR, ABG, D-dimer ordered  -D-Dimer elevated  -CT-PE showed no PE    Sepsis - w/ Tachycardia/Fever 2nd to infection.  Continue IVF as appropriate and monitor clinical response w/ ABX as written.   -Possibly due to pulmonary infection given increased O2 requirement  -Zosyn started   -Fever control as needed    Anemia - etiology clinically unable to determine, w/out evidence of active bleeding/hemolysis. Asymptomatic  (with 4 weeks of admission) dated:   [] Type II MI w/ demand ischemia  [x] Demand ischemia w/out MI   [] Other -   [] Clinically insignificant Troponin Elevation of unclear etiology  [] Clinically insignificant Troponin Elevation 2nd to CKD/ESRD     of unclear clinical significance w/out signs/sxs of active ischemia.  Documented and reviewed.     HyperLipidemia - normally controlled on home Statin. Continued.  F/U w/ PCP outpt for medication initiation/adjustment as needed.     Physical Exam Performed:      General appearance:  No apparent distress  Respiratory:  Normal respiratory effort.   Cardiovascular:  Regular rate and rhythm.  Abdomen:  Soft, non-tender, non-distended.  Musculoskeletal:  No edema  Neurologic:  Non-focal  Psychiatric:  Alert and oriented    /75   Pulse 78   Temp 98.8 °F (37.1 °C)   Resp 16   Ht 1.702 m (5' 7\")   Wt 60.6 kg (133 lb 11.2 oz)   SpO2 97%   BMI 20.94 kg/m²     Diet: Diet NPO Exceptions are: Sips of Water with Meds, Sips of Clear Liquids, Ice Chips  DVT Prophylaxis: [x]PPx LMWH  []SQ Heparin  []IPC/SCDs  []Eliquis  []Xarelto  []Coumadin  []Other -      Code status: Full Code  PT/OT Eval Status:   []NOT yet ordered  []Ordered and Pending   [x]Seen with Recommendations for:  []Home independently  []Home w/ assist  []HHC  [x]SNF  []Acute Rehab    Anticipated Discharge Day/Date:  2-4 days    Anticipated Discharge Location: [x]Home  []HHC  []SNF  []Acute Rehab  []ECF  []LTAC  []Hospice  []Other -      Consults:      IP CONSULT TO DIETITIAN  PHARMACY TO DOSE MEDICATION  IP CONSULT TO GI      This patient has a high likelihood of being discharged tomorrow and is appropriate for A1 Discharge Unit in AM pending clinical course overnight: []Yes  [x]No    ------------------------------------------------------------------------------------------------------------------------------------------------------------------------    MDM  (this section is simply meant as an aid to accurate

## 2024-05-27 NOTE — CONSENT
Informed Consent for Blood Component Transfusion Note    I have discussed with the patient the rationale for blood component transfusion; its benefits in treating or preventing fatigue, organ damage, or death; and its risk which includes mild transfusion reactions, rare risk of blood borne infection, or more serious but rare reactions. I have discussed the alternatives to transfusion, including the risk and consequences of not receiving transfusion. The patient had an opportunity to ask questions and had agreed to proceed with transfusion of blood components.    Electronically signed by Dario Segovia DO on 5/27/24 at 9:24 AM EDT

## 2024-05-27 NOTE — CONSULTS
Shellfish - throat closes       ROS:   Review of Systems   Constitutional: Negative.    HENT: Negative.     Eyes: Negative.    Respiratory: Negative.     Cardiovascular: Negative.    Gastrointestinal: Negative.    Endocrine: Negative.    Genitourinary: Negative.    Musculoskeletal: Negative.    Skin: Negative.    Allergic/Immunologic: Negative.    Neurological: Negative.    Hematological: Negative.    Psychiatric/Behavioral: Negative.         Objective:     Physical Exam:   Vitals:    05/27/24 1445   BP: (!) 146/69   Pulse: 81   Resp: 16   Temp: 99.7 °F (37.6 °C)   SpO2: 95%     I/O last 3 completed shifts:  In: 120 [P.O.:120]  Out: 1500 [Urine:1500]   General appearance: alert, appears stated age, and cooperative  HEENT: PERRLA  Neck: no adenopathy, no carotid bruit, no JVD, supple, symmetrical, trachea midline, and thyroid not enlarged, symmetric, no tenderness/mass/nodules  Lungs: clear to auscultation bilaterally  Heart: regular rate and rhythm, S1, S2 normal, no murmur, click, rub or gallop  Abdomen: soft, non-tender; bowel sounds normal; no masses,  no organomegaly  Extremities: extremities normal, atraumatic, no cyanosis or edema     Lab and Imaging Review   Labs:  CBC:   Recent Labs     05/25/24  0626 05/26/24  1057 05/26/24  1616 05/27/24  0732 05/27/24  1555   WBC 6.4 9.0  --  7.8  --    HGB 7.8* 7.1* 7.2* 6.5* 8.7*   HCT 25.2* 22.3* 22.5* 20.3* 26.8*   MCV 78.7* 78.0*  --  77.7*  --     161  --  157  --      BMP:   Recent Labs     05/25/24  0626 05/26/24  1056 05/27/24  0732   * 135* 135*   K 3.6 3.5 3.0*   CL 99 103 100   CO2 24 22 23   BUN 11 10 9   CREATININE 1.0 1.1 0.9     LIVER PROFILE: No results for input(s): \"AST\", \"ALT\", \"LIPASE\", \"AMYLASE\", \"PROT\", \"BILIDIR\", \"BILITOT\", \"ALKPHOS\" in the last 72 hours.    Invalid input(s): \"ALB\"  PT/INR: No results for input(s): \"INR\" in the last 72 hours.    Invalid input(s): \"PT\"    IMAGING:  CT CHEST PULMONARY EMBOLISM W CONTRAST    Result Date:  5/26/2024  EXAMINATION: CTA OF THE CHEST 5/26/2024 8:01 am TECHNIQUE: CTA of the chest was performed after the administration of intravenous contrast.  Multiplanar reformatted images are provided for review.  MIP images are provided for review. Automated exposure control, iterative reconstruction, and/or weight based adjustment of the mA/kV was utilized to reduce the radiation dose to as low as reasonably achievable. COMPARISON: None. HISTORY: ORDERING SYSTEM PROVIDED HISTORY: elevated d-dimer TECHNOLOGIST PROVIDED HISTORY: Reason for exam:->elevated d-dimer Additional Contrast?->1 Reason for Exam: elevated d-dimer,shortness of breath FINDINGS: Pulmonary Arteries: Pulmonary arteries are adequately opacified for evaluation.  No evidence of intraluminal filling defect to suggest pulmonary embolism.  Main pulmonary artery is normal in caliber. Mediastinum: No evidence of mediastinal lymphadenopathy.  The heart and pericardium demonstrate no acute abnormality.  There is no acute abnormality of the thoracic aorta. Lungs/pleura: Right basilar airspace opacity.  No pneumothorax.  No pleural effusion.  Central airways patent. Upper Abdomen: Limited images of the upper abdomen are unremarkable. Soft Tissues/Bones: Midthoracic spine compression deformity about the T8 level     Negative for acute pulmonary embolus Right basilar airspace opacity suggesting pneumonia New compression deformity compared to previous CT 08/07/2023 involving the T8 vertebral body.     CT ABDOMEN PELVIS WO CONTRAST Additional Contrast? None    Result Date: 5/25/2024  EXAMINATION: CT OF THE ABDOMEN AND PELVIS WITHOUT CONTRAST 5/25/2024 6:03 pm TECHNIQUE: CT of the abdomen and pelvis was performed without the administration of intravenous contrast. Multiplanar reformatted images are provided for review. Automated exposure control, iterative reconstruction, and/or weight based adjustment of the mA/kV was utilized to reduce the radiation dose to as low as

## 2024-05-28 ENCOUNTER — APPOINTMENT (OUTPATIENT)
Dept: GENERAL RADIOLOGY | Age: 79
DRG: 689 | End: 2024-05-28
Payer: MEDICARE

## 2024-05-28 LAB
ANION GAP SERPL CALCULATED.3IONS-SCNC: 14 MMOL/L (ref 3–16)
BUN SERPL-MCNC: 7 MG/DL (ref 7–20)
CALCIUM SERPL-MCNC: 8.3 MG/DL (ref 8.3–10.6)
CHLORIDE SERPL-SCNC: 101 MMOL/L (ref 99–110)
CO2 SERPL-SCNC: 22 MMOL/L (ref 21–32)
CREAT SERPL-MCNC: 1 MG/DL (ref 0.6–1.2)
DEPRECATED RDW RBC AUTO: 17.8 % (ref 12.4–15.4)
EST. AVERAGE GLUCOSE BLD GHB EST-MCNC: 125.5 MG/DL
GFR SERPLBLD CREATININE-BSD FMLA CKD-EPI: 57 ML/MIN/{1.73_M2}
GLUCOSE BLD-MCNC: 126 MG/DL (ref 70–99)
GLUCOSE BLD-MCNC: 167 MG/DL (ref 70–99)
GLUCOSE BLD-MCNC: 182 MG/DL (ref 70–99)
GLUCOSE BLD-MCNC: 292 MG/DL (ref 70–99)
GLUCOSE SERPL-MCNC: 126 MG/DL (ref 70–99)
HBA1C MFR BLD: 6 %
HCT VFR BLD AUTO: 28.8 % (ref 36–48)
HGB BLD-MCNC: 9.3 G/DL (ref 12–16)
MAGNESIUM SERPL-MCNC: 1.5 MG/DL (ref 1.8–2.4)
MCH RBC QN AUTO: 25.7 PG (ref 26–34)
MCHC RBC AUTO-ENTMCNC: 32.4 G/DL (ref 31–36)
MCV RBC AUTO: 79.5 FL (ref 80–100)
PERFORMED ON: ABNORMAL
PLATELET # BLD AUTO: 194 K/UL (ref 135–450)
PMV BLD AUTO: 8.1 FL (ref 5–10.5)
POTASSIUM SERPL-SCNC: 3.4 MMOL/L (ref 3.5–5.1)
RBC # BLD AUTO: 3.63 M/UL (ref 4–5.2)
SODIUM SERPL-SCNC: 137 MMOL/L (ref 136–145)
WBC # BLD AUTO: 7.6 K/UL (ref 4–11)

## 2024-05-28 PROCEDURE — 6360000002 HC RX W HCPCS: Performed by: STUDENT IN AN ORGANIZED HEALTH CARE EDUCATION/TRAINING PROGRAM

## 2024-05-28 PROCEDURE — 83735 ASSAY OF MAGNESIUM: CPT

## 2024-05-28 PROCEDURE — 1200000000 HC SEMI PRIVATE

## 2024-05-28 PROCEDURE — 73552 X-RAY EXAM OF FEMUR 2/>: CPT

## 2024-05-28 PROCEDURE — 2580000003 HC RX 258: Performed by: STUDENT IN AN ORGANIZED HEALTH CARE EDUCATION/TRAINING PROGRAM

## 2024-05-28 PROCEDURE — 97530 THERAPEUTIC ACTIVITIES: CPT

## 2024-05-28 PROCEDURE — 97535 SELF CARE MNGMENT TRAINING: CPT

## 2024-05-28 PROCEDURE — 94761 N-INVAS EAR/PLS OXIMETRY MLT: CPT

## 2024-05-28 PROCEDURE — 6360000002 HC RX W HCPCS: Performed by: INTERNAL MEDICINE

## 2024-05-28 PROCEDURE — 36415 COLL VENOUS BLD VENIPUNCTURE: CPT

## 2024-05-28 PROCEDURE — 80048 BASIC METABOLIC PNL TOTAL CA: CPT

## 2024-05-28 PROCEDURE — 73502 X-RAY EXAM HIP UNI 2-3 VIEWS: CPT

## 2024-05-28 PROCEDURE — 6370000000 HC RX 637 (ALT 250 FOR IP): Performed by: NURSE PRACTITIONER

## 2024-05-28 PROCEDURE — 2580000003 HC RX 258: Performed by: INTERNAL MEDICINE

## 2024-05-28 PROCEDURE — 2700000000 HC OXYGEN THERAPY PER DAY

## 2024-05-28 PROCEDURE — 85027 COMPLETE CBC AUTOMATED: CPT

## 2024-05-28 PROCEDURE — 6370000000 HC RX 637 (ALT 250 FOR IP): Performed by: STUDENT IN AN ORGANIZED HEALTH CARE EDUCATION/TRAINING PROGRAM

## 2024-05-28 PROCEDURE — C9113 INJ PANTOPRAZOLE SODIUM, VIA: HCPCS | Performed by: STUDENT IN AN ORGANIZED HEALTH CARE EDUCATION/TRAINING PROGRAM

## 2024-05-28 RX ORDER — LOPERAMIDE HYDROCHLORIDE 2 MG/1
2 CAPSULE ORAL 4 TIMES DAILY PRN
Status: DISCONTINUED | OUTPATIENT
Start: 2024-05-28 | End: 2024-06-03 | Stop reason: HOSPADM

## 2024-05-28 RX ORDER — CLOTRIMAZOLE 10 MG/1
10 LOZENGE ORAL; TOPICAL
Status: DISCONTINUED | OUTPATIENT
Start: 2024-05-28 | End: 2024-06-03 | Stop reason: HOSPADM

## 2024-05-28 RX ADMIN — ENOXAPARIN SODIUM 40 MG: 100 INJECTION SUBCUTANEOUS at 09:12

## 2024-05-28 RX ADMIN — METOPROLOL SUCCINATE 100 MG: 50 TABLET, EXTENDED RELEASE ORAL at 09:12

## 2024-05-28 RX ADMIN — CLOTRIMAZOLE 10 MG: 10 LOZENGE ORAL at 19:07

## 2024-05-28 RX ADMIN — PIPERACILLIN AND TAZOBACTAM 3375 MG: 3; .375 INJECTION, POWDER, LYOPHILIZED, FOR SOLUTION INTRAVENOUS at 21:59

## 2024-05-28 RX ADMIN — SODIUM CHLORIDE: 9 INJECTION, SOLUTION INTRAVENOUS at 21:54

## 2024-05-28 RX ADMIN — INSULIN GLARGINE 15 UNITS: 100 INJECTION, SOLUTION SUBCUTANEOUS at 21:49

## 2024-05-28 RX ADMIN — FLUTICASONE PROPIONATE 2 SPRAY: 50 SPRAY, METERED NASAL at 09:13

## 2024-05-28 RX ADMIN — PANTOPRAZOLE SODIUM 40 MG: 40 INJECTION, POWDER, FOR SOLUTION INTRAVENOUS at 21:46

## 2024-05-28 RX ADMIN — POTASSIUM CHLORIDE 40 MEQ: 1500 TABLET, EXTENDED RELEASE ORAL at 09:12

## 2024-05-28 RX ADMIN — CLOTRIMAZOLE 10 MG: 10 LOZENGE ORAL at 15:24

## 2024-05-28 RX ADMIN — ACETAMINOPHEN 650 MG: 325 TABLET ORAL at 09:11

## 2024-05-28 RX ADMIN — OXYBUTYNIN CHLORIDE 15 MG: 5 TABLET, EXTENDED RELEASE ORAL at 09:12

## 2024-05-28 RX ADMIN — INSULIN LISPRO 2 UNITS: 100 INJECTION, SOLUTION INTRAVENOUS; SUBCUTANEOUS at 11:42

## 2024-05-28 RX ADMIN — ATORVASTATIN CALCIUM 40 MG: 40 TABLET, FILM COATED ORAL at 09:12

## 2024-05-28 RX ADMIN — SODIUM CHLORIDE, PRESERVATIVE FREE 10 ML: 5 INJECTION INTRAVENOUS at 09:13

## 2024-05-28 RX ADMIN — PIPERACILLIN AND TAZOBACTAM 3375 MG: 3; .375 INJECTION, POWDER, LYOPHILIZED, FOR SOLUTION INTRAVENOUS at 04:10

## 2024-05-28 RX ADMIN — MAGNESIUM SULFATE HEPTAHYDRATE 2000 MG: 40 INJECTION, SOLUTION INTRAVENOUS at 09:28

## 2024-05-28 RX ADMIN — Medication 5 MG: at 21:49

## 2024-05-28 RX ADMIN — SODIUM CHLORIDE, PRESERVATIVE FREE 10 ML: 5 INJECTION INTRAVENOUS at 21:51

## 2024-05-28 RX ADMIN — OXYCODONE 10 MG: 5 TABLET ORAL at 09:12

## 2024-05-28 RX ADMIN — PANTOPRAZOLE SODIUM 40 MG: 40 INJECTION, POWDER, FOR SOLUTION INTRAVENOUS at 09:13

## 2024-05-28 RX ADMIN — SODIUM CHLORIDE: 9 INJECTION, SOLUTION INTRAVENOUS at 02:58

## 2024-05-28 RX ADMIN — CLOTRIMAZOLE 10 MG: 10 LOZENGE ORAL at 11:43

## 2024-05-28 RX ADMIN — PIPERACILLIN AND TAZOBACTAM 3375 MG: 3; .375 INJECTION, POWDER, LYOPHILIZED, FOR SOLUTION INTRAVENOUS at 11:42

## 2024-05-28 RX ADMIN — LOPERAMIDE HYDROCHLORIDE 2 MG: 2 CAPSULE ORAL at 09:34

## 2024-05-28 ASSESSMENT — PAIN DESCRIPTION - DESCRIPTORS: DESCRIPTORS: ACHING

## 2024-05-28 ASSESSMENT — PAIN SCALES - GENERAL
PAINLEVEL_OUTOF10: 9
PAINLEVEL_OUTOF10: 7

## 2024-05-28 ASSESSMENT — PAIN DESCRIPTION - ORIENTATION: ORIENTATION: LOWER

## 2024-05-28 ASSESSMENT — PAIN DESCRIPTION - LOCATION: LOCATION: BACK

## 2024-05-28 NOTE — PLAN OF CARE
Problem: Safety - Adult  Goal: Free from fall injury  5/28/2024 0044 by Aspen Paul RN  Outcome: Progressing  5/27/2024 1117 by Sarmad Delacruz RN  Outcome: Progressing     Problem: Discharge Planning  Goal: Discharge to home or other facility with appropriate resources  5/28/2024 0044 by Aspen Paul RN  Outcome: Progressing  5/27/2024 1117 by Sarmad Delacruz RN  Outcome: Progressing     Problem: Pain  Goal: Verbalizes/displays adequate comfort level or baseline comfort level  5/28/2024 0044 by Aspen Paul RN  Outcome: Progressing  5/27/2024 1117 by Sarmad Delacruz RN  Outcome: Progressing     Problem: Skin/Tissue Integrity  Goal: Absence of new skin breakdown  Description: 1.  Monitor for areas of redness and/or skin breakdown  2.  Assess vascular access sites hourly  3.  Every 4-6 hours minimum:  Change oxygen saturation probe site  4.  Every 4-6 hours:  If on nasal continuous positive airway pressure, respiratory therapy assess nares and determine need for appliance change or resting period.  5/28/2024 0044 by Aspen Paul RN  Outcome: Progressing  5/27/2024 1117 by Sarmad Delacruz RN  Outcome: Progressing     Problem: Confusion  Goal: Confusion, delirium, dementia, or psychosis is improved or at baseline  Description: INTERVENTIONS:  1. Assess for possible contributors to thought disturbance, including medications, impaired vision or hearing, underlying metabolic abnormalities, dehydration, psychiatric diagnoses, and notify attending LIP  2. [place] high risk fall precautions, as indicated  3. Provide frequent short contacts to provide reality reorientation, refocusing and direction  4. Decrease environmental stimuli, including noise as appropriate  5. Monitor and intervene to maintain adequate nutrition, hydration, elimination, sleep and activity  6. If unable to ensure safety without constant attention obtain sitter and review sitter guidelines with assigned personnel  7. Initiate Psychosocial CNS

## 2024-05-28 NOTE — PROGRESS NOTES
Hospital Medicine Progress Note      Date of Admission: 5/20/2024  Hospital Day: 9    Chief Admission Complaint:  AMS     Subjective: Patient is in hospital bed alert and oriented.  No new issues complaints today.  Patient states that she feels fatigued and weak at this time.  Denies any pain at this time. Spoke with nursing staff and was informed of no acute issues overnight.    Presenting Admission History:       79 y.o. female who presented to [facility] with altered mental status.  PMHx significant for DM, HLD.       Patient unable to answer questions at this time. No family at bedside.      While in ED patient was found to be afebrile with a temp of 99.3F. Respiratory rate 16 sating at 94% on RA. /96. HR 84. K 2.6. Anion gap 23. Mag 0.80. Calcium 5.6. BNP 4135. Inital trop 72 with repeat of 77. Liver panel unremarable. Hgb 9.7, MCV 76.9.  UA showed UTI, with culture pending. CT-head unremarkable. CT-abd/pelvis showed cystitis. CXR showed possible PNA vs atelectasis. Patient was given calcium gluconate, rocephin, ativan, K, and mag while in the ED. It was at this time patient would be admitted to hospital service for further evaluation and treatment.     Assessment/Plan:      Current Principal Problem:  UTI (urinary tract infection)    Acute Respiratory Failure 2/2 PNA - w/ hypoxia.  Presence of clinical respiratory distress w/ dyspnea/SOB.  Supplemental O2 and wean as tolerated.   -Etiology unclear at this time  -Initial Chest x-ray unremarkable-Trop pending-ECG without ST changes  -On 5/25/24 CXR, ABG, D-dimer ordered  -D-Dimer elevated  -CT-PE showed no PE; possible PNA  -Continue Zosyn     Sepsis 2/2 PNA - w/ Tachycardia/Fever 2nd to infection.  Continue IVF as appropriate and monitor clinical response w/ ABX as written.   -Possibly due to pulmonary infection given increased O2 requirement  -Zosyn started   -Fever control as needed    Anemia - etiology clinically unable to determine, w/out

## 2024-05-28 NOTE — CARE COORDINATION
[employer] cert is still good through 5/29 for [facility], report to [phone].    Desire Wilson RN

## 2024-05-28 NOTE — PROGRESS NOTES
Comprehensive Nutrition Assessment    Type and Reason for Visit:  Reassess    Nutrition Recommendations/Plan:   Continue Dysphagia Soft and Bite Sized.  Modify Ensure to Magic Cup.     Malnutrition Assessment:  Malnutrition Status:  At risk for malnutrition (Comment) (05/24/24 1233)    Context:  Acute Illness     Findings of the 6 clinical characteristics of malnutrition:  Energy Intake:  50% or less of estimated energy requirements for 5 or more days  Weight Loss:  Unable to assess     Body Fat Loss:  No significant body fat loss     Muscle Mass Loss:  No significant muscle mass loss    Fluid Accumulation:  No significant fluid accumulation     Strength:  Not Performed    Nutrition Assessment:    Follow-up. Pt with no PO intakes noted since 5/22 2/2 respiratory issues and oral thrush. Currently on Dysphagia Soft and Bite sized with Ensure TID. Magic Cup was D/C'd when pt went NPO. Will replace Ensure with Magic Cup. Encourage PO intakes and ONS intakes.    Nutrition Related Findings:    Glu 292; +BM today; Wound Type: None       Current Nutrition Intake & Therapies:    Average Meal Intake: 0%  Average Supplements Intake: Unable to assess  ADULT DIET; Dysphagia - Soft and Bite Sized  ADULT ORAL NUTRITION SUPPLEMENT; Breakfast, Lunch, Dinner; Standard High Calorie/High Protein Oral Supplement    Anthropometric Measures:  Height: 170.2 cm (5' 7\")  Ideal Body Weight (IBW): 135 lbs (61 kg)       Current Body Weight: 60.6 kg (133 lb 9.6 oz), 99 % IBW. Weight Source: Bed Scale  Current BMI (kg/m2): 20.9        Weight Adjustment For: No Adjustment                 BMI Categories: Normal Weight (BMI 18.5-24.9)    Estimated Daily Nutrient Needs:  Energy Requirements Based On: Kcal/kg (25-30 kcals/kg)  Weight Used for Energy Requirements: Current (61 kg)  Energy (kcal/day): 4389-0340  Weight Used for Protein Requirements: Current (1-1.2 g/kg)  Protein (g/day): 61-73 g  Method Used for Fluid Requirements: 1 ml/kcal  Fluid  (ml/day):      Nutrition Diagnosis:   Inadequate oral intake related to  (oral thrush) as evidenced by poor intake prior to admission, intake 0-25%, weight loss    Nutrition Interventions:   Food and/or Nutrient Delivery: Continue Current Diet, Modify Oral Nutrition Supplement  Nutrition Education/Counseling: Education not appropriate  Coordination of Nutrition Care: Continue to monitor while inpatient       Goals:  Previous Goal Met: No Progress toward Goal(s)  Goals: PO intake 50% or greater, prior to discharge       Nutrition Monitoring and Evaluation:   Behavioral-Environmental Outcomes: None Identified  Food/Nutrient Intake Outcomes: Food and Nutrient Intake, Supplement Intake  Physical Signs/Symptoms Outcomes: Weight, Nutrition Focused Physical Findings, Biochemical Data    Discharge Planning:    Continue current diet, Continue Oral Nutrition Supplement     Jelena Martin RD, LD  Contact: [phone]

## 2024-05-28 NOTE — PROGRESS NOTES
Occupational Therapy  Facility/Department: [facility] C5 - MED SURG/ORTHO  Daily Treatment Note  NAME: Shelley Maurer  : 1945  MRN: [id number]    Date of Service: 2024    Discharge Recommendations:  Subacute/Skilled Nursing Facility       Therapy discharge recommendations are subject to collaboration from the patient’s interdisciplinary healthcare team, including MD and case management recommendations.  Barriers to Home Discharge:   [] Steps to access home entry or bed/bath   [x] Unable to transfer, ambulate, or propel wheelchair household distances without assist   [x] Limited available assist at home upon discharge    [] Patient or family requests d/c to post-acute facility    [x] Poor cognition/safety awareness for d/c to home alone    [] Unable to maintain ordered weight bearing status    [] Patient with salient signs of long-standing immobility   [x] Decreased independence with ADLs   [x] Increased risk for falls   [] Other:    Patient Diagnosis(es): The primary encounter diagnosis was Altered mental status, unspecified altered mental status type. Diagnoses of Generalized abdominal pain, Wound of buttock, unspecified laterality, initial encounter, Hypokalemia, Hypocalcemia, Prolonged Q-T interval on ECG, Acute cystitis with hematuria, Hypomagnesemia, and Slurred speech were also pertinent to this visit.     Assessment    Assessment: Pt tolerated session fair, requires some encouragement for OOB activity. Pt with multiple instances of incontinence during session, total A for brief change and hygiene with second person to facilitate balance. Co-tx collaboration this date with PT staff to safely progress pt toward goals. Pt will have better occupational performance outcomes within a co-treatment than 1:1 session. Pt requiring frequent rest breaks during all activities. Pt functioning below her baseline, continue to recommend SNF at d/c.   Activity Tolerance: Patient limited by fatigue;Patient limited by  [place]  AM-PAC Inpatient Daily Activity Raw Score: 13  AM-PAC Inpatient ADL T-Scale Score : 32.03  ADL Inpatient CMS 0-100% Score: 63.03  ADL Inpatient CMS G-Code Modifier : CL    Therapy Time   Individual Concurrent Group Co-treatment   Time In       0836   Time Out       0915   Minutes       39           RACHEL Hernandez/MACKENZIE

## 2024-05-28 NOTE — PLAN OF CARE
Problem: Safety - Adult  Goal: Free from fall injury  5/28/2024 1039 by Sarmad Delacruz RN  Outcome: Progressing  5/28/2024 0044 by Aspen Paul RN  Outcome: Progressing     Problem: Discharge Planning  Goal: Discharge to home or other facility with appropriate resources  5/28/2024 1039 by Sarmad Delacruz RN  Outcome: Progressing  5/28/2024 0044 by Aspen Paul RN  Outcome: Progressing     Problem: Pain  Goal: Verbalizes/displays adequate comfort level or baseline comfort level  5/28/2024 1039 by Sarmad Delacruz RN  Outcome: Progressing  5/28/2024 0044 by Aspen Paul RN  Outcome: Progressing     Problem: Skin/Tissue Integrity  Goal: Absence of new skin breakdown  Description: 1.  Monitor for areas of redness and/or skin breakdown  2.  Assess vascular access sites hourly  3.  Every 4-6 hours minimum:  Change oxygen saturation probe site  4.  Every 4-6 hours:  If on nasal continuous positive airway pressure, respiratory therapy assess nares and determine need for appliance change or resting period.  5/28/2024 1039 by Sarmad Delacruz RN  Outcome: Progressing  5/28/2024 0044 by Aspen Paul RN  Outcome: Progressing     Problem: Confusion  Goal: Confusion, delirium, dementia, or psychosis is improved or at baseline  Description: INTERVENTIONS:  1. Assess for possible contributors to thought disturbance, including medications, impaired vision or hearing, underlying metabolic abnormalities, dehydration, psychiatric diagnoses, and notify attending LIP  2. [place] high risk fall precautions, as indicated  3. Provide frequent short contacts to provide reality reorientation, refocusing and direction  4. Decrease environmental stimuli, including noise as appropriate  5. Monitor and intervene to maintain adequate nutrition, hydration, elimination, sleep and activity  6. If unable to ensure safety without constant attention obtain sitter and review sitter guidelines with assigned personnel  7. Initiate Psychosocial CNS

## 2024-05-28 NOTE — PROGRESS NOTES
PROGRESS NOTE  S:79 yrs Patient  admitted on 5/20/2024 with Hypocalcemia [E83.51]  Hypokalemia [E87.6]  Hypomagnesemia [E83.42]  Slurred speech [R47.81]  UTI (urinary tract infection) [N39.0]  Generalized abdominal pain [R10.84]  Prolonged Q-T interval on ECG [R94.31]  Acute cystitis with hematuria [N30.01]  Wound of buttock, unspecified laterality, initial encounter [S35.330M]  Altered mental status, unspecified altered mental status type [R41.82] .    No major overnight events.  Still some confusion.  No gi bleeding    Current Hospital Schedued Meds   clotrimazole  10 mg Oral 5x Daily    pantoprazole  40 mg IntraVENous BID    polyethylene glycol  17 g Oral Daily    insulin lispro  0-4 Units SubCUTAneous TID WC    insulin lispro  0-4 Units SubCUTAneous Nightly    bisacodyl  10 mg Rectal Daily    metoprolol succinate  100 mg Oral Daily    insulin glargine  0.25 Units/kg SubCUTAneous Nightly    piperacillin-tazobactam  3,375 mg IntraVENous Q8H    melatonin  5 mg Oral Nightly    atorvastatin  40 mg Oral Daily    fluticasone  2 spray Each Nostril Daily    [Held by provider] pantoprazole  40 mg Oral QAM AC    oxyBUTYnin  15 mg Oral Daily    lidocaine  1 patch TransDERmal Daily    sodium chloride flush  5-40 mL IntraVENous 2 times per day    enoxaparin  40 mg SubCUTAneous Daily     Current Hospital IV Meds   sodium chloride      dextrose      sodium chloride 125 mL/hr at 05/28/24 0258    sodium chloride 50 mL/hr at 05/27/24 0549     Current Hospital PRN Meds  loperamide, sodium chloride, glucose, dextrose bolus **OR** dextrose bolus, glucagon (rDNA), dextrose, guaiFENesin-dextromethorphan, HYDROmorphone **OR** HYDROmorphone, LORazepam, oxyCODONE **OR** oxyCODONE, sodium chloride flush, sodium chloride, ondansetron **OR** ondansetron, acetaminophen **OR** acetaminophen, polyethylene glycol, potassium chloride **OR** potassium alternative oral replacement **OR** potassium chloride,

## 2024-05-28 NOTE — PROGRESS NOTES
Physical Therapy  Facility/Department: [facility] C5 - MED SURG/ORTHO  Daily Treatment Note  NAME: Shelley Maurer  : 1945  MRN: [id number]    Date of Service: 2024    Discharge Recommendations:  Subacute/Skilled Nursing Facility   PT Equipment Recommendations  Equipment Needed: No  Other: Defer    Patient Diagnosis(es): The primary encounter diagnosis was Altered mental status, unspecified altered mental status type. Diagnoses of Generalized abdominal pain, Wound of buttock, unspecified laterality, initial encounter, Hypokalemia, Hypocalcemia, Prolonged Q-T interval on ECG, Acute cystitis with hematuria, Hypomagnesemia, and Slurred speech were also pertinent to this visit.    Assessment   Assessment: Pt continues having high pain levels w mobility, x ray was negative.  pt required max Ax2 supine to sit, mod/maxA Of 2 STS and unable to amb today but did pivot to chair with RW A of 2. Pt is recommended for con't skilled PT and SNF at D/C.  Activity Tolerance: Patient limited by fatigue;Patient limited by pain  Equipment Needed: No  Other: Defer     Plan    Physical Therapy Plan  General Plan: 3-5 times per week  Current Treatment Recommendations: Strengthening;ROM;Balance training;Gait training;Stair training;Functional mobility training;Home exercise program;Therapeutic activities;Safety education & training;Transfer training;Patient/Caregiver education & training;Endurance training     Restrictions  Restrictions/Precautions  Restrictions/Precautions: Fall Risk  Position Activity Restriction  Other position/activity restrictions: up with assistance, IV     Subjective    Subjective  Subjective: Agreeable with encouragement  Pain: 8/10 throat and 9/10 low back denies hip pain  Orientation  Overall Orientation Status: Impaired  Orientation Level: Oriented to time;Oriented to place;Oriented to person;Disoriented to situation  Cognition  Overall Cognitive Status: Exceptions  Following Commands: Follows one step  commands with increased time  Attention Span: Attends with cues to redirect  Memory: Decreased recall of recent events  Safety Judgement: Decreased awareness of need for safety;Decreased awareness of need for assistance  Insights: Decreased awareness of deficits  Initiation: Requires cues for some  Sequencing: Requires cues for some     Objective   Vitals  Pulse: 88  BP: (!) 161/73  MAP (Calculated): 102  SpO2: 95 %  Bed Mobility Training  Bed Mobility Training: Yes  Interventions: Safety awareness training;Manual cues;Verbal cues;Visual cues;Tactile cues  Rolling: Moderate assistance;Additional time; R and L several times for brief change in bed due to bed and brief soiling (watery diarrhea)  Supine to Sit: Maximum assistance;Assist X2;Additional time;Adaptive equipment (to R with HOB elevated and use of rail)  Scooting: Minimum assistance;Additional time (to EOB)  Balance  Sitting: Impaired  Sitting - Static: Good (unsupported)  Sitting - Dynamic: Good (unsupported)  Standing: Impaired (RW)  Standing - Static: Poor;Constant support  Standing - Dynamic: Poor;Constant support  Transfer Training  Transfer Training: Yes (RW)  Sit to Stand: Moderate assistance;Assist X2; several times for transfer and brief change after again soiling brief with exertion.  Stand to Sit: Moderate assistance;Assist X2  Bed to Chair: Moderate assistance;Maximum assistance;Assist X2 (RW)     PT Exercises  Exercise Treatment: exs deferred, after 2 brief changes due to diarrhea and SPT to chair pt was too tired and painful to complete exs     Safety Devices  Type of Devices: Left in chair;Chair alarm in place;Call light within reach;Nurse notified;Gait belt       Goals  Short Term Goals  Time Frame for Short Term Goals: 5/28/24; 5/28 con't goals  Short Term Goal 1: pt will complete bed mobility with SBA  -5/24 max total dependent of 2  Short Term Goal 2: pt will complete transfers with SBA  -5/24 mod/max rw  Short Term Goal 3: pt will

## 2024-05-29 ENCOUNTER — APPOINTMENT (OUTPATIENT)
Dept: GENERAL RADIOLOGY | Age: 79
DRG: 689 | End: 2024-05-29
Payer: MEDICARE

## 2024-05-29 LAB
ALBUMIN SERPL-MCNC: 2.8 G/DL (ref 3.4–5)
ALBUMIN/GLOB SERPL: 0.9 {RATIO} (ref 1.1–2.2)
ALP SERPL-CCNC: 73 U/L (ref 40–129)
ALT SERPL-CCNC: 16 U/L (ref 10–40)
ANION GAP SERPL CALCULATED.3IONS-SCNC: 11 MMOL/L (ref 3–16)
AST SERPL-CCNC: 20 U/L (ref 15–37)
BACTERIA BLD CULT ORG #2: NORMAL
BACTERIA BLD CULT: NORMAL
BASOPHILS # BLD: 0 K/UL (ref 0–0.2)
BASOPHILS NFR BLD: 0.4 %
BILIRUB SERPL-MCNC: 0.4 MG/DL (ref 0–1)
BUN SERPL-MCNC: 8 MG/DL (ref 7–20)
CALCIUM SERPL-MCNC: 8 MG/DL (ref 8.3–10.6)
CHLORIDE SERPL-SCNC: 104 MMOL/L (ref 99–110)
CO2 SERPL-SCNC: 21 MMOL/L (ref 21–32)
CREAT SERPL-MCNC: 1.1 MG/DL (ref 0.6–1.2)
DEPRECATED RDW RBC AUTO: 18.4 % (ref 12.4–15.4)
EOSINOPHIL # BLD: 0.1 K/UL (ref 0–0.6)
EOSINOPHIL NFR BLD: 2.1 %
GFR SERPLBLD CREATININE-BSD FMLA CKD-EPI: 50 ML/MIN/{1.73_M2}
GLUCOSE BLD-MCNC: 157 MG/DL (ref 70–99)
GLUCOSE BLD-MCNC: 181 MG/DL (ref 70–99)
GLUCOSE BLD-MCNC: 197 MG/DL (ref 70–99)
GLUCOSE BLD-MCNC: 285 MG/DL (ref 70–99)
GLUCOSE SERPL-MCNC: 273 MG/DL (ref 70–99)
HCT VFR BLD AUTO: 25.5 % (ref 36–48)
HGB BLD-MCNC: 8.3 G/DL (ref 12–16)
LYMPHOCYTES # BLD: 0.6 K/UL (ref 1–5.1)
LYMPHOCYTES NFR BLD: 9.7 %
MAGNESIUM SERPL-MCNC: 1.5 MG/DL (ref 1.8–2.4)
MCH RBC QN AUTO: 25.8 PG (ref 26–34)
MCHC RBC AUTO-ENTMCNC: 32.4 G/DL (ref 31–36)
MCV RBC AUTO: 79.7 FL (ref 80–100)
MONOCYTES # BLD: 0.6 K/UL (ref 0–1.3)
MONOCYTES NFR BLD: 9.2 %
NEUTROPHILS # BLD: 5.1 K/UL (ref 1.7–7.7)
NEUTROPHILS NFR BLD: 78.6 %
NT-PROBNP SERPL-MCNC: ABNORMAL PG/ML (ref 0–449)
PERFORMED ON: ABNORMAL
PLATELET # BLD AUTO: 177 K/UL (ref 135–450)
PMV BLD AUTO: 8.9 FL (ref 5–10.5)
POTASSIUM SERPL-SCNC: 3.3 MMOL/L (ref 3.5–5.1)
PROT SERPL-MCNC: 5.8 G/DL (ref 6.4–8.2)
RBC # BLD AUTO: 3.2 M/UL (ref 4–5.2)
SODIUM SERPL-SCNC: 136 MMOL/L (ref 136–145)
WBC # BLD AUTO: 6.4 K/UL (ref 4–11)

## 2024-05-29 PROCEDURE — 83880 ASSAY OF NATRIURETIC PEPTIDE: CPT

## 2024-05-29 PROCEDURE — 2580000003 HC RX 258: Performed by: INTERNAL MEDICINE

## 2024-05-29 PROCEDURE — 6360000002 HC RX W HCPCS: Performed by: INTERNAL MEDICINE

## 2024-05-29 PROCEDURE — 6360000002 HC RX W HCPCS: Performed by: STUDENT IN AN ORGANIZED HEALTH CARE EDUCATION/TRAINING PROGRAM

## 2024-05-29 PROCEDURE — 97530 THERAPEUTIC ACTIVITIES: CPT

## 2024-05-29 PROCEDURE — 2580000003 HC RX 258: Performed by: STUDENT IN AN ORGANIZED HEALTH CARE EDUCATION/TRAINING PROGRAM

## 2024-05-29 PROCEDURE — 36415 COLL VENOUS BLD VENIPUNCTURE: CPT

## 2024-05-29 PROCEDURE — 84145 PROCALCITONIN (PCT): CPT

## 2024-05-29 PROCEDURE — 6370000000 HC RX 637 (ALT 250 FOR IP): Performed by: STUDENT IN AN ORGANIZED HEALTH CARE EDUCATION/TRAINING PROGRAM

## 2024-05-29 PROCEDURE — 1200000000 HC SEMI PRIVATE

## 2024-05-29 PROCEDURE — 83735 ASSAY OF MAGNESIUM: CPT

## 2024-05-29 PROCEDURE — 71046 X-RAY EXAM CHEST 2 VIEWS: CPT

## 2024-05-29 PROCEDURE — 80053 COMPREHEN METABOLIC PANEL: CPT

## 2024-05-29 PROCEDURE — C9113 INJ PANTOPRAZOLE SODIUM, VIA: HCPCS | Performed by: STUDENT IN AN ORGANIZED HEALTH CARE EDUCATION/TRAINING PROGRAM

## 2024-05-29 PROCEDURE — 6370000000 HC RX 637 (ALT 250 FOR IP): Performed by: NURSE PRACTITIONER

## 2024-05-29 PROCEDURE — 97535 SELF CARE MNGMENT TRAINING: CPT

## 2024-05-29 PROCEDURE — 85025 COMPLETE CBC W/AUTO DIFF WBC: CPT

## 2024-05-29 RX ORDER — FUROSEMIDE 10 MG/ML
40 INJECTION INTRAMUSCULAR; INTRAVENOUS 2 TIMES DAILY
Status: DISCONTINUED | OUTPATIENT
Start: 2024-05-29 | End: 2024-05-31

## 2024-05-29 RX ORDER — KETOROLAC TROMETHAMINE 30 MG/ML
15 INJECTION, SOLUTION INTRAMUSCULAR; INTRAVENOUS 3 TIMES DAILY
Status: COMPLETED | OUTPATIENT
Start: 2024-05-29 | End: 2024-05-31

## 2024-05-29 RX ADMIN — KETOROLAC TROMETHAMINE 15 MG: 30 INJECTION, SOLUTION INTRAMUSCULAR at 20:07

## 2024-05-29 RX ADMIN — CLOTRIMAZOLE 10 MG: 10 LOZENGE ORAL at 00:08

## 2024-05-29 RX ADMIN — Medication 5 MG: at 20:04

## 2024-05-29 RX ADMIN — METOPROLOL SUCCINATE 100 MG: 50 TABLET, EXTENDED RELEASE ORAL at 09:47

## 2024-05-29 RX ADMIN — INSULIN LISPRO 2 UNITS: 100 INJECTION, SOLUTION INTRAVENOUS; SUBCUTANEOUS at 12:00

## 2024-05-29 RX ADMIN — ATORVASTATIN CALCIUM 40 MG: 40 TABLET, FILM COATED ORAL at 09:45

## 2024-05-29 RX ADMIN — CLOTRIMAZOLE 10 MG: 10 LOZENGE ORAL at 20:04

## 2024-05-29 RX ADMIN — CLOTRIMAZOLE 10 MG: 10 LOZENGE ORAL at 10:52

## 2024-05-29 RX ADMIN — OXYCODONE 10 MG: 5 TABLET ORAL at 00:09

## 2024-05-29 RX ADMIN — CLOTRIMAZOLE 10 MG: 10 LOZENGE ORAL at 05:33

## 2024-05-29 RX ADMIN — ACETAMINOPHEN 650 MG: 325 TABLET ORAL at 09:44

## 2024-05-29 RX ADMIN — PIPERACILLIN AND TAZOBACTAM 3375 MG: 3; .375 INJECTION, POWDER, LYOPHILIZED, FOR SOLUTION INTRAVENOUS at 20:20

## 2024-05-29 RX ADMIN — CLOTRIMAZOLE 10 MG: 10 LOZENGE ORAL at 14:35

## 2024-05-29 RX ADMIN — FUROSEMIDE 40 MG: 10 INJECTION, SOLUTION INTRAMUSCULAR; INTRAVENOUS at 14:35

## 2024-05-29 RX ADMIN — PANTOPRAZOLE SODIUM 40 MG: 40 INJECTION, POWDER, FOR SOLUTION INTRAVENOUS at 20:12

## 2024-05-29 RX ADMIN — KETOROLAC TROMETHAMINE 15 MG: 30 INJECTION, SOLUTION INTRAMUSCULAR at 14:35

## 2024-05-29 RX ADMIN — PIPERACILLIN AND TAZOBACTAM 3375 MG: 3; .375 INJECTION, POWDER, LYOPHILIZED, FOR SOLUTION INTRAVENOUS at 12:00

## 2024-05-29 RX ADMIN — INSULIN GLARGINE 15 UNITS: 100 INJECTION, SOLUTION SUBCUTANEOUS at 20:04

## 2024-05-29 RX ADMIN — SODIUM CHLORIDE, PRESERVATIVE FREE 10 ML: 5 INJECTION INTRAVENOUS at 20:10

## 2024-05-29 RX ADMIN — PANTOPRAZOLE SODIUM 40 MG: 40 INJECTION, POWDER, FOR SOLUTION INTRAVENOUS at 09:45

## 2024-05-29 RX ADMIN — ENOXAPARIN SODIUM 40 MG: 100 INJECTION SUBCUTANEOUS at 09:44

## 2024-05-29 RX ADMIN — PIPERACILLIN AND TAZOBACTAM 3375 MG: 3; .375 INJECTION, POWDER, LYOPHILIZED, FOR SOLUTION INTRAVENOUS at 05:33

## 2024-05-29 RX ADMIN — FLUTICASONE PROPIONATE 2 SPRAY: 50 SPRAY, METERED NASAL at 09:46

## 2024-05-29 RX ADMIN — OXYBUTYNIN CHLORIDE 15 MG: 5 TABLET, EXTENDED RELEASE ORAL at 09:47

## 2024-05-29 ASSESSMENT — PAIN DESCRIPTION - DESCRIPTORS: DESCRIPTORS: BURNING

## 2024-05-29 ASSESSMENT — PAIN DESCRIPTION - ORIENTATION: ORIENTATION: INNER

## 2024-05-29 ASSESSMENT — PAIN DESCRIPTION - LOCATION: LOCATION: MOUTH

## 2024-05-29 ASSESSMENT — PAIN SCALES - GENERAL: PAINLEVEL_OUTOF10: 8

## 2024-05-29 NOTE — PROGRESS NOTES
Occupational Therapy  Facility/Department: [facility] C5 - MED SURG/ORTHO  Daily Treatment Note  NAME: Shelley Maurer  : 1945  MRN: [id number]    Date of Service: 2024    Discharge Recommendations:  Subacute/Skilled Nursing Facility       Therapy discharge recommendations are subject to collaboration from the patient’s interdisciplinary healthcare team, including MD and case management recommendations.  Barriers to Home Discharge:   [] Steps to access home entry or bed/bath   [x] Unable to transfer, ambulate, or propel wheelchair household distances without assist   [x] Limited available assist at home upon discharge    [] Patient or family requests d/c to post-acute facility    [x] Poor cognition/safety awareness for d/c to home alone    [] Unable to maintain ordered weight bearing status    [] Patient with salient signs of long-standing immobility   [x] Decreased independence with ADLs   [x] Increased risk for falls   [] Other:    Patient Diagnosis(es): The primary encounter diagnosis was Altered mental status, unspecified altered mental status type. Diagnoses of Generalized abdominal pain, Wound of buttock, unspecified laterality, initial encounter, Hypokalemia, Hypocalcemia, Prolonged Q-T interval on ECG, Acute cystitis with hematuria, Hypomagnesemia, and Slurred speech were also pertinent to this visit.     Assessment    Assessment: Pt tolerated session fair, initially very fatigued difficulty staying awake and speech at times garbled, improved once up to EOB pt more alert. Pt functioning below her baseline, requiring Ax2 for all bed mobility and transfers. Pt continues to be limited by pain when WB through RLE during stand step transfers, mod-max A of 2 with RW. Pt total A for LB ADLs. Co-tx collaboration this date with PT staff to safely progress pt toward goals. Pt will have better occupational performance outcomes within a co-treatment than 1:1 session. Continue to recommend SNF at d/c.   Activity  Tolerance: Patient limited by fatigue  Discharge Recommendations: Subacute/Skilled Nursing Facility      Plan   Occupational Therapy Plan  Times Per Week: 3-5x     Restrictions  Restrictions/Precautions  Restrictions/Precautions: Fall Risk  Position Activity Restriction  Other position/activity restrictions: up with assistance, IV    Subjective   Subjective  Subjective: Pt agreeable, feels like shes living a \"repeat of yesterday.\"    Orientation  Overall Orientation Status: Within Functional Limits    Pain: Pt denies pain at rest.    Cognition  Overall Cognitive Status: Exceptions  Arousal/Alertness: Delayed responses to stimuli  Following Commands: Follows one step commands with increased time;Follows one step commands with repetition  Attention Span: Attends with cues to redirect;Difficulty attending to directions  Memory: Decreased recall of recent events  Safety Judgement: Decreased awareness of need for safety;Decreased awareness of need for assistance  Problem Solving: Impaired  Insights: Decreased awareness of deficits  Initiation: Requires cues for some  Sequencing: Requires cues for some        Objective    Vitals  Vitals  Pulse: 90  SpO2: 91 %  BP: (!) 147/76  MAP (Calculated): 100  BP Location: Right upper arm    Bed Mobility Training  Bed Mobility Training: Yes  Interventions: Safety awareness training;Manual cues;Verbal cues;Visual cues;Tactile cues  Supine to Sit: Moderate assistance;Maximum assistance;Assist X2;Additional time (to R with HOB elevated)  Scooting: Minimum assistance    Balance  Sitting: Impaired  Sitting - Static: Good (unsupported)  Sitting - Dynamic: Good (unsupported)  Standing: Impaired (RW)  Standing - Static: Poor;Constant support  Standing - Dynamic: Poor;Constant support    Transfer Training  Transfer Training: Yes (RW)  Sit to Stand: Moderate assistance;Maximum assistance;Assist X2  Stand to Sit: Moderate assistance;Assist X2  Bed to Chair: Moderate assistance;Maximum

## 2024-05-29 NOTE — PROGRESS NOTES
Physical Therapy  Facility/Department: [facility] C5 - MED SURG/ORTHO  Daily Treatment Note  NAME: Shelley Maurer  : 1945  MRN: [id number]    Date of Service: 2024    Discharge Recommendations:  Subacute/Skilled Nursing Facility   PT Equipment Recommendations  Equipment Needed: No  Other: Defer    Patient Diagnosis(es): The primary encounter diagnosis was Altered mental status, unspecified altered mental status type. Diagnoses of Generalized abdominal pain, Wound of buttock, unspecified laterality, initial encounter, Hypokalemia, Hypocalcemia, Prolonged Q-T interval on ECG, Acute cystitis with hematuria, Hypomagnesemia, and Slurred speech were also pertinent to this visit.    Assessment   Assessment: Pt continues having high pain levels w mobility, x ray cont to be  negative.  pt required max Ax2 supine to sit, mod Of 2 STS RW. She was unable to amb today but did pivot to chair with RW A of 2. Pt is recommended for con't skilled PT and SNF at D/C.  Activity Tolerance: Patient limited by fatigue  Equipment Needed: No  Other: Defer     Plan    Physical Therapy Plan  General Plan: 3-5 times per week  Current Treatment Recommendations: Strengthening;ROM;Balance training;Gait training;Stair training;Functional mobility training;Home exercise program;Therapeutic activities;Safety education & training;Transfer training;Patient/Caregiver education & training;Endurance training     Restrictions  Restrictions/Precautions  Restrictions/Precautions: Fall Risk  Position Activity Restriction  Other position/activity restrictions: up with assistance, IV     Subjective    Subjective  Subjective: Agreeable with encouragement  Pain: Pt denies pain at rest.  Orientation  Overall Orientation Status: Within Functional Limits  Orientation Level: Oriented to time;Oriented to place;Oriented to person;Disoriented to situation  Cognition  Overall Cognitive Status: Exceptions  Arousal/Alertness: Delayed responses to stimuli  Following

## 2024-05-29 NOTE — CARE COORDINATION
Writer reviewed chart and spoke with RN, and MD, patient is going for chest xray, and needs an Echo. Plan is [facility] when medically ready, will need a new [employer] cert when ready.     Desire Wilson RN

## 2024-05-29 NOTE — DISCHARGE INSTR - COC
Continuity of Care Form    Patient Name: Shelley Maurer   :  1945  MRN:  [id number]    Admit date:  2024  Discharge date:  2024    Code Status Order: Full Code   Advance Directives:     Admitting Physician:  Dario Segovia DO  PCP: Christina Montague MD    Discharging Nurse: HANNAH Bañuelos  Discharging Hospital Unit/Room#: 0541/0541-01  Discharging Unit Phone Number: [phone]    Emergency Contact:   Extended Emergency Contact Information  Primary Emergency Contact: derrick mora  Home Phone: [phone]  Mobile Phone: [phone]  Relation: Child  Secondary Emergency Contact: Jian Dillard  Home Phone: [phone]  Relation: Child    Past Surgical History:  Past Surgical History:   Procedure Laterality Date    BACK SURGERY      cervical    BLADDER SUSPENSION      X 3    CHOLECYSTECTOMY      COLONOSCOPY  5-3-16    normal    HYSTERECTOMY (CERVIX STATUS UNKNOWN)      NOSE SURGERY      fracture    TOE SURGERY      UPPER GASTROINTESTINAL ENDOSCOPY  11    UPPER GASTROINTESTINAL ENDOSCOPY  5-3-16    biopsy esophagus + dilatation    WRIST SURGERY         Immunization History:   Immunization History   Administered Date(s) Administered    COVID-19, J&J, (age 18y+), IM, 0.5 mL 2021    COVID-19, PFIZER PURPLE top, DILUTE for use, (age 12 y+), 30mcg/0.3mL 2021       Active Problems:  Patient Active Problem List   Diagnosis Code    UTI (urinary tract infection) N39.0       Isolation/Infection:   Isolation            No Isolation          Patient Infection Status       None to display            Nurse Assessment:  Last Vital Signs: /67   Pulse (!) 108   Temp 98 °F (36.7 °C) (Oral)   Resp 19   Ht 1.702 m (5' 7\")   Wt 60.6 kg (133 lb 11.2 oz)   SpO2 93%   BMI 20.94 kg/m²     Last documented pain score (0-10 scale): Pain Level: 8  Last Weight:   Wt Readings from Last 1 Encounters:   24 60.6 kg (133 lb 11.2 oz)     Mental Status:  oriented and alert    IV Access:  -

## 2024-05-29 NOTE — PROGRESS NOTES
V2.0    [facility] [place]      Name:  Shelley Maurer /Age/Sex: 1945  (79 y.o. female)   MRN & CSN:  [id number] & [id number] Encounter Date/Time: 2024 1:42 PM EDT   Location:  [address] [place] [address] PCP: Christina Montague MD     Attending:Ravi Duran MD       Hospital Day: 10    Assessment and Recommendations   79-year-old  female with a significant past medical history of IDDM 2, hyperlipidemia, hypertension who presented to the [place] ED with altered mental status and found to have acute hypoxemic respiratory failure secondary pneumonia      Acute hypoxemic respiratory failure secondary to pneumonia  Right lower lobe pneumonia  Sepsis with tachycardia and fever secondary to pneumonia  Acute CHF, unclassified  Oral thrush  Abdominal pain/constipation  IDDM 2 with hyperglycemia  Acute metabolic encephalopathy secondary to sepsis and pneumonia, improved  Hypomagnesemia  Hypokalemia  Hypocalcemia  Demand ischemia secondary to decompensated CHF and sepsis  Hyperlipidemia    Plan:   Will DC narcotic and switch to IV Toradol   Continue IV Zosyn day 5  Will check today's labs CBC, CMP, procalcitonin, BNP  NT proBNP highly elevated, will recheck chest x-ray AP and lateral view  Echocardiogram  Strict I's and O's  Daily weight  Lasix 40 mg IV every 12 hours closely monitor renal functions and electrolytes  Replace magnesium and potassium  Continue with Lantus and sliding scale  Continue with statin and Toprol-XL  Appreciate GI eval recommendation and has signed off  Supportive care  Labs in a.m.              Comment: Please note this report has been produced using speech recognition software and may contain errors related to that system including errors in grammar, punctuation, and spelling, as well as words and phrases that may be inappropriate. If there are any questions or concerns please feel free to contact the dictating provider for clarification.   Diet ADULT DIET; Dysphagia - Soft and Bite  Mediastinum: No evidence of mediastinal lymphadenopathy.  The heart and pericardium demonstrate no acute abnormality.  There is no acute abnormality of the thoracic aorta. Lungs/pleura: Right basilar airspace opacity.  No pneumothorax.  No pleural effusion.  Central airways patent. Upper Abdomen: Limited images of the upper abdomen are unremarkable. Soft Tissues/Bones: Midthoracic spine compression deformity about the T8 level     Negative for acute pulmonary embolus Right basilar airspace opacity suggesting pneumonia New compression deformity compared to previous CT 08/07/2023 involving the T8 vertebral body.     CT ABDOMEN PELVIS WO CONTRAST Additional Contrast? None    Result Date: 5/25/2024  EXAMINATION: CT OF THE ABDOMEN AND PELVIS WITHOUT CONTRAST 5/25/2024 6:03 pm TECHNIQUE: CT of the abdomen and pelvis was performed without the administration of intravenous contrast. Multiplanar reformatted images are provided for review. Automated exposure control, iterative reconstruction, and/or weight based adjustment of the mA/kV was utilized to reduce the radiation dose to as low as reasonably achievable. COMPARISON: 05/20/2024 HISTORY: ORDERING SYSTEM PROVIDED HISTORY: abdominal pain TECHNOLOGIST PROVIDED HISTORY: Reason for exam:->abdominal pain Additional Contrast?->None Reason for Exam: AMS/ Abd pain FINDINGS: Lower Chest: Interval development of patchy opacities in the right lower lobe, as compared to prior CT.  Mild dependent opacities in the medial left lung base again noted.  No effusion. Organs: Evaluation of the abdominal and pelvic viscera is limited in the absence of contrast.  Lobulated liver contour.  Cholecystectomy.  The pancreas, spleen, adrenals and kidneys reveal no acute findings. GI/Bowel: Mild amount of fluid and gas within small bowel loops noted, but not significantly distended and without transition point.  This may represent mild ileus.  Moderate amount stool burden, predominantly in the  proximal colon.  Diverticulosis. Pelvis: No acute findings. Peritoneum/Retroperitoneum: No free air, ascites or lymphadenopathy. Calcified atheromatous plaque is present. Bones/Soft Tissues: Focal induration of the subcutaneous fat and tiny focus of gas in the right lower subcutaneous fat is likely due to medication injection site.  No acute osseous abnormality identified.     1. Interval development of patchy opacities in the right lower lobe, as compared to prior CT. This may represent infiltrate. 2. Findings suggestive of mild small bowel ileus. 3. Moderate amount stool burden, predominantly in the proximal colon. 4. Additional chronic and benign findings, as described.     XR CHEST PORTABLE    Result Date: 5/25/2024  EXAMINATION: ONE XRAY VIEW OF THE CHEST 5/25/2024 3:01 pm COMPARISON: 05/23/2024 chest plain film. HISTORY: ORDERING SYSTEM PROVIDED HISTORY: SOB TECHNOLOGIST PROVIDED HISTORY: Reason for exam:->SOB FINDINGS & IMPRESSION: Subtle opacity at the lateral left lung base could be pneumonia in the correct clinical setting. No significant pleural effusion. No significant pulmonary vascular congestion. No visible pneumothorax. Cardiac silhouette normal in size.     XR CHEST PORTABLE    Result Date: 5/23/2024  EXAMINATION: ONE XRAY VIEW OF THE CHEST 5/23/2024 9:45 am COMPARISON: 05/20/2024 HISTORY: ORDERING SYSTEM PROVIDED HISTORY: dyspnea TECHNOLOGIST PROVIDED HISTORY: Reason for exam:->dyspnea Reason for Exam: dyspnea FINDINGS: The lungs appear clear. The heart and mediastinal structures are unremarkable. Bony thorax appears normal. Visualized upper abdomen is unremarkable.     No acute cardiopulmonary process.       CBC:   Recent Labs     05/27/24  0732 05/27/24  1555 05/28/24  0626 05/29/24  1145   WBC 7.8  --  7.6 6.4   HGB 6.5* 8.7* 9.3* 8.3*     --  194 177     BMP:    Recent Labs     05/27/24  0732 05/27/24  1834 05/28/24  0626 05/29/24  1145   *  --  137 136   K 3.0* 3.9 3.4* 3.3*   CL

## 2024-05-29 NOTE — PLAN OF CARE
Problem: Safety - Adult  Goal: Free from fall injury  5/28/2024 2333 by Aspen Paul RN  Outcome: Progressing  5/28/2024 1039 by Sarmad Delacruz RN  Outcome: Progressing     Problem: Discharge Planning  Goal: Discharge to home or other facility with appropriate resources  5/28/2024 2333 by Aspen Paul RN  Outcome: Progressing  5/28/2024 1039 by Sarmad Delacruz RN  Outcome: Progressing     Problem: Pain  Goal: Verbalizes/displays adequate comfort level or baseline comfort level  5/28/2024 2333 by Aspen Paul RN  Outcome: Progressing  5/28/2024 1039 by Sarmad Delacruz RN  Outcome: Progressing     Problem: Skin/Tissue Integrity  Goal: Absence of new skin breakdown  Description: 1.  Monitor for areas of redness and/or skin breakdown  2.  Assess vascular access sites hourly  3.  Every 4-6 hours minimum:  Change oxygen saturation probe site  4.  Every 4-6 hours:  If on nasal continuous positive airway pressure, respiratory therapy assess nares and determine need for appliance change or resting period.  5/28/2024 2333 by Aspen Paul RN  Outcome: Progressing  5/28/2024 1039 by Sarmad Delacruz RN  Outcome: Progressing     Problem: Confusion  Goal: Confusion, delirium, dementia, or psychosis is improved or at baseline  Description: INTERVENTIONS:  1. Assess for possible contributors to thought disturbance, including medications, impaired vision or hearing, underlying metabolic abnormalities, dehydration, psychiatric diagnoses, and notify attending LIP  2. [place] high risk fall precautions, as indicated  3. Provide frequent short contacts to provide reality reorientation, refocusing and direction  4. Decrease environmental stimuli, including noise as appropriate  5. Monitor and intervene to maintain adequate nutrition, hydration, elimination, sleep and activity  6. If unable to ensure safety without constant attention obtain sitter and review sitter guidelines with assigned personnel  7. Initiate Psychosocial CNS

## 2024-05-30 ENCOUNTER — APPOINTMENT (OUTPATIENT)
Age: 79
DRG: 689 | End: 2024-05-30
Attending: INTERNAL MEDICINE
Payer: MEDICARE

## 2024-05-30 LAB
ALBUMIN SERPL-MCNC: 3.1 G/DL (ref 3.4–5)
ALBUMIN/GLOB SERPL: 0.9 {RATIO} (ref 1.1–2.2)
ALP SERPL-CCNC: 78 U/L (ref 40–129)
ALT SERPL-CCNC: 17 U/L (ref 10–40)
ANION GAP SERPL CALCULATED.3IONS-SCNC: 13 MMOL/L (ref 3–16)
AST SERPL-CCNC: 20 U/L (ref 15–37)
BASOPHILS # BLD: 0 K/UL (ref 0–0.2)
BASOPHILS NFR BLD: 0.5 %
BILIRUB SERPL-MCNC: 0.3 MG/DL (ref 0–1)
BUN SERPL-MCNC: 7 MG/DL (ref 7–20)
CALCIUM SERPL-MCNC: 8.5 MG/DL (ref 8.3–10.6)
CHLORIDE SERPL-SCNC: 101 MMOL/L (ref 99–110)
CO2 SERPL-SCNC: 25 MMOL/L (ref 21–32)
CREAT SERPL-MCNC: 1.1 MG/DL (ref 0.6–1.2)
DEPRECATED RDW RBC AUTO: 18.3 % (ref 12.4–15.4)
ECHO AO ROOT DIAM: 3.3 CM
ECHO AO ROOT INDEX: 1.94 CM/M2
ECHO AV PEAK GRADIENT: 5 MMHG
ECHO AV PEAK VELOCITY: 1.1 M/S
ECHO AV VELOCITY RATIO: 0.64
ECHO EST RA PRESSURE: 3 MMHG
ECHO LA AREA 2C: 20.5 CM2
ECHO LA AREA 4C: 19.6 CM2
ECHO LA DIAMETER INDEX: 1.71 CM/M2
ECHO LA DIAMETER: 2.9 CM
ECHO LA MAJOR AXIS: 5.5 CM
ECHO LA MINOR AXIS: 4.8 CM
ECHO LA TO AORTIC ROOT RATIO: 0.88
ECHO LA VOL BP: 65 ML (ref 22–52)
ECHO LA VOL MOD A2C: 72 ML (ref 22–52)
ECHO LA VOL MOD A4C: 52 ML (ref 22–52)
ECHO LA VOL/BSA BIPLANE: 38 ML/M2 (ref 16–34)
ECHO LA VOLUME INDEX MOD A2C: 42 ML/M2 (ref 16–34)
ECHO LA VOLUME INDEX MOD A4C: 31 ML/M2 (ref 16–34)
ECHO LV E' LATERAL VELOCITY: 4 CM/S
ECHO LV E' SEPTAL VELOCITY: 4 CM/S
ECHO LV EDV A2C: 73 ML
ECHO LV EDV A4C: 65 ML
ECHO LV EDV INDEX A4C: 38 ML/M2
ECHO LV EDV NDEX A2C: 43 ML/M2
ECHO LV EJECTION FRACTION A2C: 42 %
ECHO LV EJECTION FRACTION A4C: 40 %
ECHO LV EJECTION FRACTION BIPLANE: 41 % (ref 55–100)
ECHO LV ESV A2C: 42 ML
ECHO LV ESV A4C: 39 ML
ECHO LV ESV INDEX A2C: 25 ML/M2
ECHO LV ESV INDEX A4C: 23 ML/M2
ECHO LV FRACTIONAL SHORTENING: 30 % (ref 28–44)
ECHO LV INTERNAL DIMENSION DIASTOLE INDEX: 2.53 CM/M2
ECHO LV INTERNAL DIMENSION DIASTOLIC: 4.3 CM (ref 3.9–5.3)
ECHO LV INTERNAL DIMENSION SYSTOLIC INDEX: 1.76 CM/M2
ECHO LV INTERNAL DIMENSION SYSTOLIC: 3 CM
ECHO LV ISOVOLUMETRIC RELAXATION TIME (IVRT): 70 MS
ECHO LV IVSD: 1 CM (ref 0.6–0.9)
ECHO LV MASS 2D: 142.5 G (ref 67–162)
ECHO LV MASS INDEX 2D: 83.8 G/M2 (ref 43–95)
ECHO LV POSTERIOR WALL DIASTOLIC: 1 CM (ref 0.6–0.9)
ECHO LV RELATIVE WALL THICKNESS RATIO: 0.47
ECHO LVOT PEAK GRADIENT: 2 MMHG
ECHO LVOT PEAK VELOCITY: 0.7 M/S
ECHO MV A VELOCITY: 0.83 M/S
ECHO MV E VELOCITY: 0.51 M/S
ECHO MV E/A RATIO: 0.61
ECHO MV E/E' LATERAL: 12.75
ECHO MV E/E' RATIO (AVERAGED): 12.75
ECHO MV E/E' SEPTAL: 12.75
ECHO RIGHT VENTRICULAR SYSTOLIC PRESSURE (RVSP): 22 MMHG
ECHO TV REGURGITANT MAX VELOCITY: 2.19 M/S
ECHO TV REGURGITANT PEAK GRADIENT: 19 MMHG
EOSINOPHIL # BLD: 0.1 K/UL (ref 0–0.6)
EOSINOPHIL NFR BLD: 2 %
GFR SERPLBLD CREATININE-BSD FMLA CKD-EPI: 50 ML/MIN/{1.73_M2}
GLUCOSE BLD-MCNC: 127 MG/DL (ref 70–99)
GLUCOSE BLD-MCNC: 243 MG/DL (ref 70–99)
GLUCOSE BLD-MCNC: 255 MG/DL (ref 70–99)
GLUCOSE BLD-MCNC: 269 MG/DL (ref 70–99)
GLUCOSE SERPL-MCNC: 252 MG/DL (ref 70–99)
HCT VFR BLD AUTO: 27.2 % (ref 36–48)
HGB BLD-MCNC: 8.8 G/DL (ref 12–16)
LYMPHOCYTES # BLD: 0.7 K/UL (ref 1–5.1)
LYMPHOCYTES NFR BLD: 10.1 %
MAGNESIUM SERPL-MCNC: 1.2 MG/DL (ref 1.8–2.4)
MCH RBC QN AUTO: 25.6 PG (ref 26–34)
MCHC RBC AUTO-ENTMCNC: 32.4 G/DL (ref 31–36)
MCV RBC AUTO: 79.1 FL (ref 80–100)
MONOCYTES # BLD: 0.5 K/UL (ref 0–1.3)
MONOCYTES NFR BLD: 6.7 %
NEUTROPHILS # BLD: 5.9 K/UL (ref 1.7–7.7)
NEUTROPHILS NFR BLD: 80.7 %
NT-PROBNP SERPL-MCNC: ABNORMAL PG/ML (ref 0–449)
PERFORMED ON: ABNORMAL
PLATELET # BLD AUTO: 217 K/UL (ref 135–450)
PMV BLD AUTO: 8.8 FL (ref 5–10.5)
POTASSIUM SERPL-SCNC: 3 MMOL/L (ref 3.5–5.1)
PROCALCITONIN SERPL IA-MCNC: 0.58 NG/ML (ref 0–0.15)
PROT SERPL-MCNC: 6.5 G/DL (ref 6.4–8.2)
RBC # BLD AUTO: 3.44 M/UL (ref 4–5.2)
SODIUM SERPL-SCNC: 139 MMOL/L (ref 136–145)
WBC # BLD AUTO: 7.3 K/UL (ref 4–11)

## 2024-05-30 PROCEDURE — 85025 COMPLETE CBC W/AUTO DIFF WBC: CPT

## 2024-05-30 PROCEDURE — 2700000000 HC OXYGEN THERAPY PER DAY

## 2024-05-30 PROCEDURE — 2580000003 HC RX 258: Performed by: INTERNAL MEDICINE

## 2024-05-30 PROCEDURE — 36415 COLL VENOUS BLD VENIPUNCTURE: CPT

## 2024-05-30 PROCEDURE — 80053 COMPREHEN METABOLIC PANEL: CPT

## 2024-05-30 PROCEDURE — 97110 THERAPEUTIC EXERCISES: CPT

## 2024-05-30 PROCEDURE — 6360000002 HC RX W HCPCS: Performed by: STUDENT IN AN ORGANIZED HEALTH CARE EDUCATION/TRAINING PROGRAM

## 2024-05-30 PROCEDURE — 1200000000 HC SEMI PRIVATE

## 2024-05-30 PROCEDURE — 83880 ASSAY OF NATRIURETIC PEPTIDE: CPT

## 2024-05-30 PROCEDURE — C9113 INJ PANTOPRAZOLE SODIUM, VIA: HCPCS | Performed by: STUDENT IN AN ORGANIZED HEALTH CARE EDUCATION/TRAINING PROGRAM

## 2024-05-30 PROCEDURE — 93306 TTE W/DOPPLER COMPLETE: CPT | Performed by: INTERNAL MEDICINE

## 2024-05-30 PROCEDURE — 6360000002 HC RX W HCPCS: Performed by: INTERNAL MEDICINE

## 2024-05-30 PROCEDURE — 6370000000 HC RX 637 (ALT 250 FOR IP): Performed by: NURSE PRACTITIONER

## 2024-05-30 PROCEDURE — 94761 N-INVAS EAR/PLS OXIMETRY MLT: CPT

## 2024-05-30 PROCEDURE — 97530 THERAPEUTIC ACTIVITIES: CPT

## 2024-05-30 PROCEDURE — 6370000000 HC RX 637 (ALT 250 FOR IP): Performed by: INTERNAL MEDICINE

## 2024-05-30 PROCEDURE — 97535 SELF CARE MNGMENT TRAINING: CPT

## 2024-05-30 PROCEDURE — 83735 ASSAY OF MAGNESIUM: CPT

## 2024-05-30 PROCEDURE — 93306 TTE W/DOPPLER COMPLETE: CPT

## 2024-05-30 PROCEDURE — 6370000000 HC RX 637 (ALT 250 FOR IP): Performed by: STUDENT IN AN ORGANIZED HEALTH CARE EDUCATION/TRAINING PROGRAM

## 2024-05-30 RX ORDER — MAGNESIUM SULFATE IN WATER 40 MG/ML
2000 INJECTION, SOLUTION INTRAVENOUS ONCE
Status: COMPLETED | OUTPATIENT
Start: 2024-05-30 | End: 2024-05-30

## 2024-05-30 RX ORDER — POTASSIUM CHLORIDE 20 MEQ/1
40 TABLET, EXTENDED RELEASE ORAL ONCE
Status: COMPLETED | OUTPATIENT
Start: 2024-05-30 | End: 2024-05-30

## 2024-05-30 RX ORDER — DIPHENHYDRAMINE HCL 25 MG
50 TABLET ORAL ONCE
Status: COMPLETED | OUTPATIENT
Start: 2024-05-31 | End: 2024-05-31

## 2024-05-30 RX ADMIN — PIPERACILLIN AND TAZOBACTAM 3375 MG: 3; .375 INJECTION, POWDER, LYOPHILIZED, FOR SOLUTION INTRAVENOUS at 04:45

## 2024-05-30 RX ADMIN — ENOXAPARIN SODIUM 40 MG: 100 INJECTION SUBCUTANEOUS at 09:55

## 2024-05-30 RX ADMIN — CLOTRIMAZOLE 10 MG: 10 LOZENGE ORAL at 00:32

## 2024-05-30 RX ADMIN — POTASSIUM CHLORIDE 40 MEQ: 1500 TABLET, EXTENDED RELEASE ORAL at 16:12

## 2024-05-30 RX ADMIN — FUROSEMIDE 40 MG: 10 INJECTION, SOLUTION INTRAMUSCULAR; INTRAVENOUS at 19:45

## 2024-05-30 RX ADMIN — MAGNESIUM SULFATE HEPTAHYDRATE 2000 MG: 40 INJECTION, SOLUTION INTRAVENOUS at 15:39

## 2024-05-30 RX ADMIN — FUROSEMIDE 40 MG: 10 INJECTION, SOLUTION INTRAMUSCULAR; INTRAVENOUS at 09:54

## 2024-05-30 RX ADMIN — INSULIN LISPRO 2 UNITS: 100 INJECTION, SOLUTION INTRAVENOUS; SUBCUTANEOUS at 17:08

## 2024-05-30 RX ADMIN — INSULIN LISPRO 2 UNITS: 100 INJECTION, SOLUTION INTRAVENOUS; SUBCUTANEOUS at 12:00

## 2024-05-30 RX ADMIN — KETOROLAC TROMETHAMINE 15 MG: 30 INJECTION, SOLUTION INTRAMUSCULAR at 22:33

## 2024-05-30 RX ADMIN — INSULIN GLARGINE 15 UNITS: 100 INJECTION, SOLUTION SUBCUTANEOUS at 22:36

## 2024-05-30 RX ADMIN — PANTOPRAZOLE SODIUM 40 MG: 40 INJECTION, POWDER, FOR SOLUTION INTRAVENOUS at 22:36

## 2024-05-30 RX ADMIN — FLUTICASONE PROPIONATE 2 SPRAY: 50 SPRAY, METERED NASAL at 10:00

## 2024-05-30 RX ADMIN — ATORVASTATIN CALCIUM 40 MG: 40 TABLET, FILM COATED ORAL at 09:55

## 2024-05-30 RX ADMIN — KETOROLAC TROMETHAMINE 15 MG: 30 INJECTION, SOLUTION INTRAMUSCULAR at 16:14

## 2024-05-30 RX ADMIN — CLOTRIMAZOLE 10 MG: 10 LOZENGE ORAL at 22:36

## 2024-05-30 RX ADMIN — PIPERACILLIN AND TAZOBACTAM 3375 MG: 3; .375 INJECTION, POWDER, LYOPHILIZED, FOR SOLUTION INTRAVENOUS at 22:36

## 2024-05-30 RX ADMIN — CLOTRIMAZOLE 10 MG: 10 LOZENGE ORAL at 05:31

## 2024-05-30 RX ADMIN — CLOTRIMAZOLE 10 MG: 10 LOZENGE ORAL at 19:45

## 2024-05-30 RX ADMIN — PANTOPRAZOLE SODIUM 40 MG: 40 INJECTION, POWDER, FOR SOLUTION INTRAVENOUS at 09:54

## 2024-05-30 RX ADMIN — Medication 5 MG: at 22:36

## 2024-05-30 RX ADMIN — PIPERACILLIN AND TAZOBACTAM 3375 MG: 3; .375 INJECTION, POWDER, LYOPHILIZED, FOR SOLUTION INTRAVENOUS at 14:09

## 2024-05-30 RX ADMIN — CLOTRIMAZOLE 10 MG: 10 LOZENGE ORAL at 09:55

## 2024-05-30 RX ADMIN — CLOTRIMAZOLE 10 MG: 10 LOZENGE ORAL at 15:35

## 2024-05-30 RX ADMIN — OXYBUTYNIN CHLORIDE 15 MG: 5 TABLET, EXTENDED RELEASE ORAL at 09:55

## 2024-05-30 RX ADMIN — KETOROLAC TROMETHAMINE 15 MG: 30 INJECTION, SOLUTION INTRAMUSCULAR at 09:54

## 2024-05-30 RX ADMIN — METOPROLOL SUCCINATE 100 MG: 50 TABLET, EXTENDED RELEASE ORAL at 09:55

## 2024-05-30 ASSESSMENT — PAIN DESCRIPTION - ORIENTATION
ORIENTATION: RIGHT
ORIENTATION: RIGHT

## 2024-05-30 ASSESSMENT — PAIN DESCRIPTION - LOCATION
LOCATION: HIP;BACK
LOCATION: HIP

## 2024-05-30 ASSESSMENT — PAIN SCALES - GENERAL
PAINLEVEL_OUTOF10: 8
PAINLEVEL_OUTOF10: 10

## 2024-05-30 ASSESSMENT — PAIN DESCRIPTION - DESCRIPTORS
DESCRIPTORS: ACHING;STABBING;THROBBING
DESCRIPTORS: STABBING

## 2024-05-30 NOTE — PROGRESS NOTES
V2.0    [facility] [place] Progress Note      Name:  Shelley Maurer /Age/Sex: 1945  (79 y.o. female)   MRN & CSN:  [id number] & [id number] Encounter Date/Time: 2024 1:42 PM EDT   Location:  [address] [place] [address] PCP: Christina Montague MD     Attending:Ravi Duran MD       Hospital Day: 11    Assessment and Recommendations   79-year-old  female with a significant past medical history of IDDM 2, hyperlipidemia, hypertension who presented to the [place] ED with altered mental status and found to have acute hypoxemic respiratory failure secondary pneumonia      Acute hypoxemic respiratory failure secondary to pneumonia  Right lower lobe pneumonia  Sepsis with tachycardia and fever secondary to pneumonia  Acute CHF, unclassified  Oral thrush  Abdominal pain/constipation  IDDM 2 with hyperglycemia  Acute metabolic encephalopathy secondary to sepsis and pneumonia, improved  Hypomagnesemia  Hypokalemia  Hypocalcemia  Demand ischemia secondary to decompensated CHF and sepsis  Hyperlipidemia    Plan:     Continue IV Zosyn day 6  NT proBNP trending up, continue with 40 mg IV Lasix every 12 hours and closely monitor renal functions lites  Replace potassium and magnesium  Echocardiogram  Strict I's and O's  Daily weight  Continue with Lantus and sliding scale  Continue with statin and Toprol-XL  Appreciate GI eval recommendation and has signed off  Supportive SW to re-initiate pre-CERT  Labs in a.m.              Comment: Please note this report has been produced using speech recognition software and may contain errors related to that system including errors in grammar, punctuation, and spelling, as well as words and phrases that may be inappropriate. If there are any questions or concerns please feel free to contact the dictating provider for clarification.   Diet ADULT DIET; Dysphagia - Soft and Bite Sized  ADULT ORAL NUTRITION SUPPLEMENT; Lunch, Dinner; Frozen Oral Supplement   DVT Prophylaxis [x] Lovenox, []     BILITOT 0.4 0.3   ALKPHOS 73 78       Lipids: No results found for: \"CHOL\", \"HDL\", \"TRIG\"  Hemoglobin A1C:   Lab Results   Component Value Date/Time    LABA1C 6.0 05/27/2024 12:09 PM     TSH: No results found for: \"TSH\"  Troponin: No results found for: \"TROPONINT\"  Lactic Acid: No results for input(s): \"LACTA\" in the last 72 hours.  BNP:   Recent Labs     05/29/24  1145 05/30/24  1109   PROBNP 15,062* 17,688*       UA:  Lab Results   Component Value Date/Time    NITRU Negative 05/25/2024 04:25 PM    COLORU Yellow 05/25/2024 04:25 PM    PHUR 5.5 05/25/2024 04:25 PM    PHUR 6.0 08/07/2023 12:43 PM    WBCUA 6-9 05/25/2024 04:25 PM    RBCUA 0-2 05/25/2024 04:25 PM    MUCUS 2+ 05/25/2024 04:25 PM    BACTERIA 2+ 05/25/2024 04:25 PM    CLARITYU Clear 05/25/2024 04:25 PM    LEUKOCYTESUR TRACE 05/25/2024 04:25 PM    UROBILINOGEN 0.2 05/25/2024 04:25 PM    BILIRUBINUR Negative 05/25/2024 04:25 PM    BLOODU TRACE-LYSED 05/25/2024 04:25 PM    GLUCOSEU 100 05/25/2024 04:25 PM    KETUA Negative 05/25/2024 04:25 PM     Urine Cultures:   Lab Results   Component Value Date/Time    LABURIN >100,000 CFU/ml 05/20/2024 04:18 PM     Blood Cultures:   Lab Results   Component Value Date/Time    BC No Growth after 4 days of incubation. 05/25/2024 07:03 PM     Lab Results   Component Value Date/Time    BLOODCULT2 No Growth after 4 days of incubation. 05/25/2024 07:03 PM     Organism:   Lab Results   Component Value Date/Time    ORG Escherichia coli 05/20/2024 04:18 PM         Electronically signed by Ravi Duran MD on 5/30/2024 at 1:31 PM

## 2024-05-30 NOTE — CARE COORDINATION
Writer reviewed chart, and spoke with Everardo @ [facility]. Marlena cert is good through 5/31/24.     Desire Wilson RN

## 2024-05-30 NOTE — PROGRESS NOTES
Physical Therapy  Facility/Department: [facility] C5 - MED SURG/ORTHO  Daily Treatment Note  NAME: Shelley Maurer  : 1945  MRN: [id number]    Date of Service: 2024    Discharge Recommendations:  Subacute/Skilled Nursing Facility   PT Equipment Recommendations  Equipment Needed: No  Other: Defer    Patient Diagnosis(es): The primary encounter diagnosis was Altered mental status, unspecified altered mental status type. Diagnoses of Generalized abdominal pain, Wound of buttock, unspecified laterality, initial encounter, Hypokalemia, Hypocalcemia, Prolonged Q-T interval on ECG, Acute cystitis with hematuria, Hypomagnesemia, Slurred speech, and Congestive heart failure, unspecified HF chronicity, unspecified heart failure type (HCC) were also pertinent to this visit.    Assessment   Assessment: Pt cont to need A of 2 for functional mobility and trnasfers.  Today pt utilized the stedy for tranfers and required min/mod A Of 2, standing twice for 2 min each and using the commode while up.  Pt reocmmended for con't skilled PT and SNF at D/C.  Activity Tolerance: Patient limited by fatigue  Equipment Needed: No  Other: Defer     Plan    Physical Therapy Plan  General Plan: 3-5 times per week  Current Treatment Recommendations: Strengthening;ROM;Balance training;Gait training;Stair training;Functional mobility training;Home exercise program;Therapeutic activities;Safety education & training;Transfer training;Patient/Caregiver education & training;Endurance training     Restrictions  Restrictions/Precautions  Restrictions/Precautions: Fall Risk  Position Activity Restriction  Other position/activity restrictions: up with assistance, IV, O2     Subjective    Subjective  Subjective: Agreeable with encouragement  Pain: Pt denies pain at rest, reports pain in RLE with all mobility and standing trials (not rated).     Objective   Vitals  Pulse: 86  BP: (!) 173/74  MAP (Calculated): 107  SpO2: 97 %  O2 Device: Nasal  cannula  Bed Mobility Training  Bed Mobility Training: Yes  Supine to Sit: Maximum assistance (to L with HOB elevated)  Scooting: Minimum assistance (to EOB)  Balance  Sitting: Impaired  Sitting - Static: Good (unsupported)  Sitting - Dynamic: Good (unsupported)  Standing: Impaired (Temi SMITH)  Standing - Static: Poor;Constant support  Transfer Training  Transfer Training: Yes (Temi SMITH)  Sit to Stand: Minimum assistance;Moderate assistance;Assist X2;Additional time;Adaptive equipment (stedy)  Stand to Sit: Minimum assistance;Assist X2;Additional time;Adaptive equipment (stedy)  Toilet Transfer: Minimum assistance;Moderate assistance;Assist X2;Additional time;Adaptive equipment (stedy)  Gait  Gait Training: No (staic  stedy X 2, 2 min each with A of 1-2 for balance)     PT Exercises  Exercise Treatment: BLE AP, QS, GS, X 15     Safety Devices  Type of Devices: Left in chair;Chair alarm in place;Call light within reach;Nurse notified;Gait belt;Telesitter in use       Goals  Short Term Goals  Time Frame for Short Term Goals: 5/28/24 - extend goals to 6/4 due to longer than expected LOS; 5/30 con't goals  Short Term Goal 1: pt will complete bed mobility with SBA  -5/24 max total dependent of 2  Short Term Goal 2: pt will complete transfers with SBA  -5/24 mod/max rw  Short Term Goal 3: pt will ambulate 100ft with LRAD and SBA  -5/24 NIRAJ  Short Term Goal 4: pt will navigate up<>down 1 steps with CGA  -5/24 NIRAJ  Short Term Goal 5: pt will particiapte in 10-12 reps of BLE exercises by 5/24/24  -5/24 on-going  Additional Goals?: No  Patient Goals   Patient Goals : \"To go home\"    Education  Patient Education  Education Given To: Patient  Education Provided: Role of Therapy;Plan of Care;Transfer Training;Equipment  Education Method: Verbal  Barriers to Learning: None  Education Outcome: Verbalized understanding;Continued education needed    AM-PAC - Mobility    AM-PAC Basic Mobility - Inpatient   How much help is

## 2024-05-30 NOTE — PROGRESS NOTES
Occupational Therapy  Facility/Department: [facility] C5 - MED SURG/ORTHO  Daily Treatment Note  NAME: Shelley Maurer  : 1945  MRN: [id number]    Date of Service: 2024    Discharge Recommendations:  Subacute/Skilled Nursing Facility       Therapy discharge recommendations are subject to collaboration from the patient’s interdisciplinary healthcare team, including MD and case management recommendations.  Barriers to Home Discharge:   [] Steps to access home entry or bed/bath   [x] Unable to transfer, ambulate, or propel wheelchair household distances without assist   [x] Limited available assist at home upon discharge    [] Patient or family requests d/c to post-acute facility    [x] Poor cognition/safety awareness for d/c to home alone    [] Unable to maintain ordered weight bearing status    [] Patient with salient signs of long-standing immobility   [x] Decreased independence with ADLs   [x] Increased risk for falls   [] Other:     Patient Diagnosis(es): The primary encounter diagnosis was Altered mental status, unspecified altered mental status type. Diagnoses of Generalized abdominal pain, Wound of buttock, unspecified laterality, initial encounter, Hypokalemia, Hypocalcemia, Prolonged Q-T interval on ECG, Acute cystitis with hematuria, Hypomagnesemia, Slurred speech, and Congestive heart failure, unspecified HF chronicity, unspecified heart failure type (HCC) were also pertinent to this visit.     Assessment    Assessment: Pt tolerated session well overall, continues to be limited at times by fatigue but with cues able to attend to tasks. Pt requiring Ax2 and use of Temi STEDY for mobility to/from bathroom. Pt reporting severe pain in hip while seated on toilet and standing on Temi STEDY (no pain at rest). Pt max-total A for all LB ADLs. Pt requires increased time for grooming and bathing with VCs at times for sequencing. Co-tx collaboration this date with PT staff to safely progress pt toward goals. Pt  will have better occupational performance outcomes within a co-treatment than 1:1 session. Pt functioning below her baseline, continue to recommend SNF at d/c.   Activity Tolerance: Patient limited by fatigue  Discharge Recommendations: Subacute/Skilled Nursing Facility      Plan   Occupational Therapy Plan  Times Per Week: 3-5x     Restrictions  Restrictions/Precautions  Restrictions/Precautions: Fall Risk  Position Activity Restriction  Other position/activity restrictions: up with assistance, IV, O2    Subjective   Subjective  Subjective: Pt in bed at approach, agreeable to OT treatment.    Orientation  Overall Orientation Status: Within Functional Limits    Pain: Pt denies pain at rest, reports pain in RLE with all mobility and standing trials (not rated).    Cognition  Overall Cognitive Status: Exceptions  Following Commands: Follows one step commands with increased time  Attention Span: Attends with cues to redirect  Initiation: Requires cues for some  Sequencing: Requires cues for some        Objective    Vitals  Vitals:    05/30/24 0803   BP: (!) 173/74   Pulse: 86   Resp:    Temp:    SpO2: 97%         Bed Mobility Training  Bed Mobility Training: Yes  Interventions: Safety awareness training;Manual cues;Verbal cues;Visual cues;Tactile cues  Supine to Sit: Maximum assistance (to L with HOB elevated)  Scooting: Minimum assistance (to EOB)    Balance  Sitting: Impaired  Sitting - Static: Good (unsupported)  Sitting - Dynamic: Good (unsupported)  Standing: Impaired (Temi SMITH)  Standing - Static: Poor;Constant support    Transfer Training  Transfer Training: Yes (Temi SMITH)  Sit to Stand: Minimum assistance;Moderate assistance;Assist X2  Stand to Sit: Minimum assistance;Assist X2  Toilet Transfer: Moderate assistance;Assist X2 (Temi SMITH)       ADL  Grooming: Setup;Stand by assistance;Verbal cueing  Grooming Skilled Clinical Factors: to wash face and brush teeth  UE Bathing: Setup;Stand by

## 2024-05-30 NOTE — PROGRESS NOTES
Comprehensive Nutrition Assessment    Type and Reason for Visit:  Reassess    Nutrition Recommendations/Plan:   Recommend carb controlled, soft and bite sized diet  Glucerna at breakfast, Magic Cup at lunch and dinner  Encourage po intakes  Monitor po intakes, nutrition adequacy, weights, pertinent labs, BMs     Malnutrition Assessment:  Malnutrition Status:  At risk for malnutrition (Comment) (05/24/24 3983)      Nutrition Assessment:    Follow up: Pt remains nutritionally compromised AEB pt report of variable po intakes. Pt currently on a soft and bite sized diet with Magic cup BID. PO intakes 0-100% per EMR. Pt reports that she has been eating most of her Magic Cup. Will continue. Pt willing to drink ONS at breakfast and have Magic cup at lunch and dinner. Will add carb restriction d/t elevated BG. Encouraged po intakes. Will monitor.    Nutrition Related Findings:    Last BM 5/28. Trace BLE edema Wound Type: None       Current Nutrition Intake & Therapies:    Average Meal Intake: 0%, 1-25%, 26-50%, 51-75%, %  Average Supplements Intake: 26-50%, 51-75%, %  ADULT ORAL NUTRITION SUPPLEMENT; Lunch, Dinner; Frozen Oral Supplement  ADULT DIET; Dysphagia - Soft and Bite Sized; 5 carb choices (75 gm/meal)  ADULT ORAL NUTRITION SUPPLEMENT; Breakfast; Diabetic Oral Supplement    Anthropometric Measures:  Height: 170.2 cm (5' 7\")  Ideal Body Weight (IBW): 135 lbs (61 kg)       Current Body Weight: 60.6 kg (133 lb 11.2 oz), 99 % IBW. Weight Source: Bed Scale  Current BMI (kg/m2): 20.9        Weight Adjustment For: No Adjustment                 BMI Categories: Normal Weight (BMI 18.5-24.9)    Estimated Daily Nutrient Needs:  Energy Requirements Based On: Kcal/kg (25-30 kcals/kg)  Weight Used for Energy Requirements: Current (61 kg)  Energy (kcal/day): 7351-5252  Weight Used for Protein Requirements: Current (1-1.2 g/kg)  Protein (g/day): 61-73 g  Method Used for Fluid Requirements: 1 ml/kcal    Nutrition  Diagnosis:   Inadequate oral intake related to  (oral thrush) as evidenced by poor intake prior to admission, intake 0-25%, weight loss    Nutrition Interventions:   Food and/or Nutrient Delivery: Continue Current Diet, Continue Oral Nutrition Supplement, Start Oral Nutrition Supplement  Nutrition Education/Counseling: Education not appropriate  Coordination of Nutrition Care: Continue to monitor while inpatient       Goals:  Previous Goal Met: No Progress toward Goal(s)  Goals: PO intake 50% or greater, prior to discharge       Nutrition Monitoring and Evaluation:   Behavioral-Environmental Outcomes: None Identified  Food/Nutrient Intake Outcomes: Food and Nutrient Intake, Supplement Intake  Physical Signs/Symptoms Outcomes: Weight, Nutrition Focused Physical Findings, Biochemical Data    Discharge Planning:    Continue current diet, Continue Oral Nutrition Supplement     Clary White RD, LD  Contact: [phone]

## 2024-05-30 NOTE — PLAN OF CARE
Problem: Safety - Adult  Goal: Free from fall injury  Outcome: Progressing     Problem: Discharge Planning  Goal: Discharge to home or other facility with appropriate resources  Outcome: Progressing     Problem: Pain  Goal: Verbalizes/displays adequate comfort level or baseline comfort level  Outcome: Progressing     Problem: Skin/Tissue Integrity  Goal: Absence of new skin breakdown  Description: 1.  Monitor for areas of redness and/or skin breakdown  2.  Assess vascular access sites hourly  3.  Every 4-6 hours minimum:  Change oxygen saturation probe site  4.  Every 4-6 hours:  If on nasal continuous positive airway pressure, respiratory therapy assess nares and determine need for appliance change or resting period.  Outcome: Progressing     Problem: Confusion  Goal: Confusion, delirium, dementia, or psychosis is improved or at baseline  Description: INTERVENTIONS:  1. Assess for possible contributors to thought disturbance, including medications, impaired vision or hearing, underlying metabolic abnormalities, dehydration, psychiatric diagnoses, and notify attending LIP  2. [place] high risk fall precautions, as indicated  3. Provide frequent short contacts to provide reality reorientation, refocusing and direction  4. Decrease environmental stimuli, including noise as appropriate  5. Monitor and intervene to maintain adequate nutrition, hydration, elimination, sleep and activity  6. If unable to ensure safety without constant attention obtain sitter and review sitter guidelines with assigned personnel  7. Initiate Psychosocial CNS and Spiritual Care consult, as indicated  Outcome: Progressing     Problem: Nutrition Deficit:  Goal: Optimize nutritional status  Outcome: Progressing     Problem: Chronic Conditions and Co-morbidities  Goal: Patient's chronic conditions and co-morbidity symptoms are monitored and maintained or improved  Outcome: Progressing

## 2024-05-31 LAB
ALBUMIN SERPL-MCNC: 3 G/DL (ref 3.4–5)
ALBUMIN SERPL-MCNC: 3.1 G/DL (ref 3.4–5)
ALBUMIN/GLOB SERPL: 0.9 {RATIO} (ref 1.1–2.2)
ALBUMIN/GLOB SERPL: 1 {RATIO} (ref 1.1–2.2)
ALP SERPL-CCNC: 70 U/L (ref 40–129)
ALP SERPL-CCNC: 81 U/L (ref 40–129)
ALT SERPL-CCNC: 15 U/L (ref 10–40)
ALT SERPL-CCNC: 16 U/L (ref 10–40)
ANION GAP SERPL CALCULATED.3IONS-SCNC: 12 MMOL/L (ref 3–16)
ANION GAP SERPL CALCULATED.3IONS-SCNC: 14 MMOL/L (ref 3–16)
AST SERPL-CCNC: 19 U/L (ref 15–37)
AST SERPL-CCNC: 20 U/L (ref 15–37)
BASOPHILS # BLD: 0 K/UL (ref 0–0.2)
BASOPHILS NFR BLD: 0.6 %
BILIRUB SERPL-MCNC: 0.3 MG/DL (ref 0–1)
BILIRUB SERPL-MCNC: <0.2 MG/DL (ref 0–1)
BUN SERPL-MCNC: 6 MG/DL (ref 7–20)
BUN SERPL-MCNC: 7 MG/DL (ref 7–20)
CALCIUM SERPL-MCNC: 8.1 MG/DL (ref 8.3–10.6)
CALCIUM SERPL-MCNC: 8.3 MG/DL (ref 8.3–10.6)
CHLORIDE SERPL-SCNC: 100 MMOL/L (ref 99–110)
CHLORIDE SERPL-SCNC: 97 MMOL/L (ref 99–110)
CO2 SERPL-SCNC: 25 MMOL/L (ref 21–32)
CO2 SERPL-SCNC: 26 MMOL/L (ref 21–32)
CREAT SERPL-MCNC: 1 MG/DL (ref 0.6–1.2)
CREAT SERPL-MCNC: 1.1 MG/DL (ref 0.6–1.2)
DEPRECATED RDW RBC AUTO: 18.4 % (ref 12.4–15.4)
EOSINOPHIL # BLD: 0.2 K/UL (ref 0–0.6)
EOSINOPHIL NFR BLD: 2.9 %
GFR SERPLBLD CREATININE-BSD FMLA CKD-EPI: 50 ML/MIN/{1.73_M2}
GFR SERPLBLD CREATININE-BSD FMLA CKD-EPI: 57 ML/MIN/{1.73_M2}
GLUCOSE BLD-MCNC: 173 MG/DL (ref 70–99)
GLUCOSE BLD-MCNC: 213 MG/DL (ref 70–99)
GLUCOSE BLD-MCNC: 220 MG/DL (ref 70–99)
GLUCOSE BLD-MCNC: 283 MG/DL (ref 70–99)
GLUCOSE SERPL-MCNC: 178 MG/DL (ref 70–99)
GLUCOSE SERPL-MCNC: 220 MG/DL (ref 70–99)
HCT VFR BLD AUTO: 29.3 % (ref 36–48)
HGB BLD-MCNC: 9.5 G/DL (ref 12–16)
LYMPHOCYTES # BLD: 0.8 K/UL (ref 1–5.1)
LYMPHOCYTES NFR BLD: 14.5 %
MAGNESIUM SERPL-MCNC: 1.7 MG/DL (ref 1.8–2.4)
MAGNESIUM SERPL-MCNC: 1.9 MG/DL (ref 1.8–2.4)
MCH RBC QN AUTO: 25.6 PG (ref 26–34)
MCHC RBC AUTO-ENTMCNC: 32.4 G/DL (ref 31–36)
MCV RBC AUTO: 79 FL (ref 80–100)
MONOCYTES # BLD: 0.3 K/UL (ref 0–1.3)
MONOCYTES NFR BLD: 5.7 %
NEUTROPHILS # BLD: 4.5 K/UL (ref 1.7–7.7)
NEUTROPHILS NFR BLD: 76.3 %
NT-PROBNP SERPL-MCNC: ABNORMAL PG/ML (ref 0–449)
PERFORMED ON: ABNORMAL
PLATELET # BLD AUTO: 231 K/UL (ref 135–450)
PMV BLD AUTO: 8.8 FL (ref 5–10.5)
POTASSIUM SERPL-SCNC: 2.8 MMOL/L (ref 3.5–5.1)
POTASSIUM SERPL-SCNC: 3.1 MMOL/L (ref 3.5–5.1)
PROT SERPL-MCNC: 6 G/DL (ref 6.4–8.2)
PROT SERPL-MCNC: 6.6 G/DL (ref 6.4–8.2)
RBC # BLD AUTO: 3.71 M/UL (ref 4–5.2)
SODIUM SERPL-SCNC: 136 MMOL/L (ref 136–145)
SODIUM SERPL-SCNC: 138 MMOL/L (ref 136–145)
WBC # BLD AUTO: 5.8 K/UL (ref 4–11)

## 2024-05-31 PROCEDURE — 6360000002 HC RX W HCPCS: Performed by: INTERNAL MEDICINE

## 2024-05-31 PROCEDURE — 94761 N-INVAS EAR/PLS OXIMETRY MLT: CPT

## 2024-05-31 PROCEDURE — 80053 COMPREHEN METABOLIC PANEL: CPT

## 2024-05-31 PROCEDURE — 6370000000 HC RX 637 (ALT 250 FOR IP): Performed by: STUDENT IN AN ORGANIZED HEALTH CARE EDUCATION/TRAINING PROGRAM

## 2024-05-31 PROCEDURE — 6360000002 HC RX W HCPCS: Performed by: STUDENT IN AN ORGANIZED HEALTH CARE EDUCATION/TRAINING PROGRAM

## 2024-05-31 PROCEDURE — C9113 INJ PANTOPRAZOLE SODIUM, VIA: HCPCS | Performed by: STUDENT IN AN ORGANIZED HEALTH CARE EDUCATION/TRAINING PROGRAM

## 2024-05-31 PROCEDURE — 2580000003 HC RX 258: Performed by: INTERNAL MEDICINE

## 2024-05-31 PROCEDURE — 2580000003 HC RX 258: Performed by: STUDENT IN AN ORGANIZED HEALTH CARE EDUCATION/TRAINING PROGRAM

## 2024-05-31 PROCEDURE — 2700000000 HC OXYGEN THERAPY PER DAY

## 2024-05-31 PROCEDURE — 83880 ASSAY OF NATRIURETIC PEPTIDE: CPT

## 2024-05-31 PROCEDURE — 99222 1ST HOSP IP/OBS MODERATE 55: CPT | Performed by: INTERNAL MEDICINE

## 2024-05-31 PROCEDURE — 85025 COMPLETE CBC W/AUTO DIFF WBC: CPT

## 2024-05-31 PROCEDURE — 6370000000 HC RX 637 (ALT 250 FOR IP): Performed by: INTERNAL MEDICINE

## 2024-05-31 PROCEDURE — 83735 ASSAY OF MAGNESIUM: CPT

## 2024-05-31 PROCEDURE — 6370000000 HC RX 637 (ALT 250 FOR IP): Performed by: NURSE PRACTITIONER

## 2024-05-31 PROCEDURE — 1200000000 HC SEMI PRIVATE

## 2024-05-31 PROCEDURE — 36415 COLL VENOUS BLD VENIPUNCTURE: CPT

## 2024-05-31 RX ORDER — LISINOPRIL 2.5 MG/1
2.5 TABLET ORAL DAILY
Status: DISCONTINUED | OUTPATIENT
Start: 2024-05-31 | End: 2024-05-31

## 2024-05-31 RX ORDER — ASPIRIN 81 MG/1
81 TABLET, CHEWABLE ORAL DAILY
Status: DISCONTINUED | OUTPATIENT
Start: 2024-05-31 | End: 2024-06-03 | Stop reason: HOSPADM

## 2024-05-31 RX ADMIN — CLOTRIMAZOLE 10 MG: 10 LOZENGE ORAL at 15:24

## 2024-05-31 RX ADMIN — ATORVASTATIN CALCIUM 40 MG: 40 TABLET, FILM COATED ORAL at 09:33

## 2024-05-31 RX ADMIN — LOPERAMIDE HYDROCHLORIDE 2 MG: 2 CAPSULE ORAL at 16:48

## 2024-05-31 RX ADMIN — ENOXAPARIN SODIUM 40 MG: 100 INJECTION SUBCUTANEOUS at 09:32

## 2024-05-31 RX ADMIN — POTASSIUM CHLORIDE 10 MEQ: 7.46 INJECTION, SOLUTION INTRAVENOUS at 12:46

## 2024-05-31 RX ADMIN — CLOTRIMAZOLE 10 MG: 10 LOZENGE ORAL at 06:25

## 2024-05-31 RX ADMIN — PIPERACILLIN AND TAZOBACTAM 3375 MG: 3; .375 INJECTION, POWDER, LYOPHILIZED, FOR SOLUTION INTRAVENOUS at 20:38

## 2024-05-31 RX ADMIN — PANTOPRAZOLE SODIUM 40 MG: 40 INJECTION, POWDER, FOR SOLUTION INTRAVENOUS at 09:32

## 2024-05-31 RX ADMIN — POTASSIUM CHLORIDE 10 MEQ: 7.46 INJECTION, SOLUTION INTRAVENOUS at 14:53

## 2024-05-31 RX ADMIN — POTASSIUM CHLORIDE 10 MEQ: 7.46 INJECTION, SOLUTION INTRAVENOUS at 06:24

## 2024-05-31 RX ADMIN — INSULIN LISPRO 1 UNITS: 100 INJECTION, SOLUTION INTRAVENOUS; SUBCUTANEOUS at 16:48

## 2024-05-31 RX ADMIN — LOPERAMIDE HYDROCHLORIDE 2 MG: 2 CAPSULE ORAL at 09:53

## 2024-05-31 RX ADMIN — SODIUM CHLORIDE, PRESERVATIVE FREE 10 ML: 5 INJECTION INTRAVENOUS at 20:34

## 2024-05-31 RX ADMIN — FUROSEMIDE 40 MG: 10 INJECTION, SOLUTION INTRAMUSCULAR; INTRAVENOUS at 09:33

## 2024-05-31 RX ADMIN — LISINOPRIL 2.5 MG: 2.5 TABLET ORAL at 12:05

## 2024-05-31 RX ADMIN — OXYBUTYNIN CHLORIDE 15 MG: 5 TABLET, EXTENDED RELEASE ORAL at 09:33

## 2024-05-31 RX ADMIN — ACETAMINOPHEN 650 MG: 325 TABLET ORAL at 15:44

## 2024-05-31 RX ADMIN — PIPERACILLIN AND TAZOBACTAM 3375 MG: 3; .375 INJECTION, POWDER, LYOPHILIZED, FOR SOLUTION INTRAVENOUS at 14:55

## 2024-05-31 RX ADMIN — INSULIN LISPRO 1 UNITS: 100 INJECTION, SOLUTION INTRAVENOUS; SUBCUTANEOUS at 12:09

## 2024-05-31 RX ADMIN — PIPERACILLIN AND TAZOBACTAM 3375 MG: 3; .375 INJECTION, POWDER, LYOPHILIZED, FOR SOLUTION INTRAVENOUS at 04:27

## 2024-05-31 RX ADMIN — DIPHENHYDRAMINE HYDROCHLORIDE 50 MG: 25 TABLET ORAL at 00:14

## 2024-05-31 RX ADMIN — POTASSIUM CHLORIDE 10 MEQ: 7.46 INJECTION, SOLUTION INTRAVENOUS at 01:25

## 2024-05-31 RX ADMIN — METOPROLOL SUCCINATE 100 MG: 50 TABLET, EXTENDED RELEASE ORAL at 09:33

## 2024-05-31 RX ADMIN — Medication 5 MG: at 20:32

## 2024-05-31 RX ADMIN — CLOTRIMAZOLE 10 MG: 10 LOZENGE ORAL at 20:32

## 2024-05-31 RX ADMIN — KETOROLAC TROMETHAMINE 15 MG: 30 INJECTION, SOLUTION INTRAMUSCULAR at 09:36

## 2024-05-31 RX ADMIN — ASPIRIN 81 MG 81 MG: 81 TABLET ORAL at 12:09

## 2024-05-31 RX ADMIN — FLUTICASONE PROPIONATE 2 SPRAY: 50 SPRAY, METERED NASAL at 09:42

## 2024-05-31 RX ADMIN — CLOTRIMAZOLE 10 MG: 10 LOZENGE ORAL at 12:05

## 2024-05-31 RX ADMIN — PANTOPRAZOLE SODIUM 40 MG: 40 INJECTION, POWDER, FOR SOLUTION INTRAVENOUS at 20:33

## 2024-05-31 RX ADMIN — LOPERAMIDE HYDROCHLORIDE 2 MG: 2 CAPSULE ORAL at 20:42

## 2024-05-31 RX ADMIN — SODIUM CHLORIDE, PRESERVATIVE FREE 10 ML: 5 INJECTION INTRAVENOUS at 09:31

## 2024-05-31 RX ADMIN — POTASSIUM CHLORIDE 10 MEQ: 7.46 INJECTION, SOLUTION INTRAVENOUS at 04:31

## 2024-05-31 RX ADMIN — INSULIN GLARGINE 15 UNITS: 100 INJECTION, SOLUTION SUBCUTANEOUS at 20:33

## 2024-05-31 RX ADMIN — POTASSIUM CHLORIDE 10 MEQ: 7.46 INJECTION, SOLUTION INTRAVENOUS at 09:51

## 2024-05-31 RX ADMIN — ACETAMINOPHEN 650 MG: 325 TABLET ORAL at 02:20

## 2024-05-31 ASSESSMENT — PAIN SCALES - GENERAL
PAINLEVEL_OUTOF10: 6
PAINLEVEL_OUTOF10: 10
PAINLEVEL_OUTOF10: 0

## 2024-05-31 ASSESSMENT — PAIN DESCRIPTION - ORIENTATION
ORIENTATION: LEFT
ORIENTATION: RIGHT
ORIENTATION: RIGHT

## 2024-05-31 ASSESSMENT — PAIN DESCRIPTION - LOCATION
LOCATION: HIP
LOCATION: HIP
LOCATION: ARM

## 2024-05-31 ASSESSMENT — PAIN DESCRIPTION - DESCRIPTORS: DESCRIPTORS: ACHING

## 2024-05-31 NOTE — PLAN OF CARE
Problem: Safety - Adult  Goal: Free from fall injury  Outcome: Progressing     Problem: Discharge Planning  Goal: Discharge to home or other facility with appropriate resources  Outcome: Progressing     Problem: Pain  Goal: Verbalizes/displays adequate comfort level or baseline comfort level  Outcome: Progressing     Problem: Skin/Tissue Integrity  Goal: Absence of new skin breakdown  Description: 1.  Monitor for areas of redness and/or skin breakdown  2.  Assess vascular access sites hourly  3.  Every 4-6 hours minimum:  Change oxygen saturation probe site  4.  Every 4-6 hours:  If on nasal continuous positive airway pressure, respiratory therapy assess nares and determine need for appliance change or resting period.  Outcome: Progressing     Problem: Confusion  Goal: Confusion, delirium, dementia, or psychosis is improved or at baseline  Description: INTERVENTIONS:  1. Assess for possible contributors to thought disturbance, including medications, impaired vision or hearing, underlying metabolic abnormalities, dehydration, psychiatric diagnoses, and notify attending LIP  2. [place] high risk fall precautions, as indicated  3. Provide frequent short contacts to provide reality reorientation, refocusing and direction  4. Decrease environmental stimuli, including noise as appropriate  5. Monitor and intervene to maintain adequate nutrition, hydration, elimination, sleep and activity  6. If unable to ensure safety without constant attention obtain sitter and review sitter guidelines with assigned personnel  7. Initiate Psychosocial CNS and Spiritual Care consult, as indicated  Outcome: Progressing     Problem: Nutrition Deficit:  Goal: Optimize nutritional status  Outcome: Progressing     Problem: Chronic Conditions and Co-morbidities  Goal: Patient's chronic conditions and co-morbidity symptoms are monitored and maintained or improved  Outcome: Progressing

## 2024-05-31 NOTE — PLAN OF CARE
Problem: Safety - Adult  Goal: Free from fall injury  5/31/2024 1325 by Tammy Mccray RN  Outcome: Progressing     Problem: Discharge Planning  Goal: Discharge to home or other facility with appropriate resources  5/31/2024 1325 by Tammy Mccray RN  Outcome: Progressing     Problem: Pain  Goal: Verbalizes/displays adequate comfort level or baseline comfort level  5/31/2024 1325 by Tammy Mccray RN  Outcome: Progressing     Problem: Skin/Tissue Integrity  Goal: Absence of new skin breakdown  Description: 1.  Monitor for areas of redness and/or skin breakdown  2.  Assess vascular access sites hourly  3.  Every 4-6 hours minimum:  Change oxygen saturation probe site  4.  Every 4-6 hours:  If on nasal continuous positive airway pressure, respiratory therapy assess nares and determine need for appliance change or resting period.  5/31/2024 1325 by Tammy Mccray RN  Outcome: Progressing     Problem: Confusion  Goal: Confusion, delirium, dementia, or psychosis is improved or at baseline  Description: INTERVENTIONS:  1. Assess for possible contributors to thought disturbance, including medications, impaired vision or hearing, underlying metabolic abnormalities, dehydration, psychiatric diagnoses, and notify attending LIP  2. [place] high risk fall precautions, as indicated  3. Provide frequent short contacts to provide reality reorientation, refocusing and direction  4. Decrease environmental stimuli, including noise as appropriate  5. Monitor and intervene to maintain adequate nutrition, hydration, elimination, sleep and activity  6. If unable to ensure safety without constant attention obtain sitter and review sitter guidelines with assigned personnel  7. Initiate Psychosocial CNS and Spiritual Care consult, as indicated  5/31/2024 1325 by Tammy Mccray RN  Outcome: Progressing     Problem: Nutrition Deficit:  Goal: Optimize nutritional status  5/31/2024 1325 by Tammy Mccray, RN  Outcome: Progressing      Problem: Chronic Conditions and Co-morbidities  Goal: Patient's chronic conditions and co-morbidity symptoms are monitored and maintained or improved  5/31/2024 1325 by Tammy Mccray RN  Outcome: Progressing

## 2024-05-31 NOTE — CARE COORDINATION
Writer reviewed chart day 11, patient ECHO was abnormal, cards was consulted for possible cath during this admission. Plan is [facility], cert expires this day and will need recerted through [facility] when closer to medically ready. Per MD likely here through the weekend.     Addendum-   Patient is active with [facility] per phone call from Catina Grayson [phone].    Desire Wilson RN

## 2024-05-31 NOTE — PROGRESS NOTES
V2.0    [facility] [place]      Name:  Shelley Maurer /Age/Sex: 1945  (79 y.o. female)   MRN & CSN:  [id number] & [id number] Encounter Date/Time: 2024 1:42 PM EDT   Location:  [address] [place] [address] PCP: Christina Montague MD     Attending:Ravi Duran MD       Hospital Day: 12    Assessment and Recommendations   79-year-old  female with a significant past medical history of IDDM 2, hyperlipidemia, hypertension who presented to the [place] ED with altered mental status and found to have acute hypoxemic respiratory failure secondary pneumonia      Acute hypoxemic respiratory failure secondary to pneumonia  Right lower lobe pneumonia  Sepsis with tachycardia and fever secondary to pneumonia  Acute CHF, combined type with 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 40-45% and grade 1 diastolic dysfunction  Oral thrush  Abdominal pain/constipation  IDDM 2 with hyperglycemia  Acute metabolic encephalopathy secondary to sepsis and pneumonia, improved  Hypomagnesemia  Hypokalemia  Hypocalcemia  Demand ischemia secondary to decompensated CHF and sepsis  Hyperlipidemia    Plan:     Continue IV Zosyn day 7  NT proBNP trending up, continue with 40 mg IV Lasix every 12 hours and closely monitor renal functions lites  Replace potassium and magnesium  Echocardiogram reviewed and shows EF of 40-45% with grade 1 diastolic dysfunction  Cardiology consulted  Patient already on Toprol- mg daily and atorvastatin 40 mg daily will add aspirin 81 mg daily and low-dose lisinopril 2.5 mg as tolerated as part of GDMT therapy  Strict I's and O's  Daily weight  Continue with Lantus and sliding scale  Appreciate GI eval recommendation and has signed off  Labs in a.m.              Comment: Please note this report has been produced using speech recognition software and may contain errors related to that system including errors in grammar, punctuation, and spelling, as well as words and phrases that may be inappropriate. If there are any questions or concerns  within small bowel loops noted, but not significantly distended and without transition point.  This may represent mild ileus.  Moderate amount stool burden, predominantly in the proximal colon.  Diverticulosis. Pelvis: No acute findings. Peritoneum/Retroperitoneum: No free air, ascites or lymphadenopathy. Calcified atheromatous plaque is present. Bones/Soft Tissues: Focal induration of the subcutaneous fat and tiny focus of gas in the right lower subcutaneous fat is likely due to medication injection site.  No acute osseous abnormality identified.     1. Interval development of patchy opacities in the right lower lobe, as compared to prior CT. This may represent infiltrate. 2. Findings suggestive of mild small bowel ileus. 3. Moderate amount stool burden, predominantly in the proximal colon. 4. Additional chronic and benign findings, as described.     XR CHEST PORTABLE    Result Date: 5/25/2024  EXAMINATION: ONE XRAY VIEW OF THE CHEST 5/25/2024 3:01 pm COMPARISON: 05/23/2024 chest plain film. HISTORY: ORDERING SYSTEM PROVIDED HISTORY: SOB TECHNOLOGIST PROVIDED HISTORY: Reason for exam:->SOB FINDINGS & IMPRESSION: Subtle opacity at the lateral left lung base could be pneumonia in the correct clinical setting. No significant pleural effusion. No significant pulmonary vascular congestion. No visible pneumothorax. Cardiac silhouette normal in size.     XR CHEST PORTABLE    Result Date: 5/23/2024  EXAMINATION: ONE XRAY VIEW OF THE CHEST 5/23/2024 9:45 am COMPARISON: 05/20/2024 HISTORY: ORDERING SYSTEM PROVIDED HISTORY: dyspnea TECHNOLOGIST PROVIDED HISTORY: Reason for exam:->dyspnea Reason for Exam: dyspnea FINDINGS: The lungs appear clear. The heart and mediastinal structures are unremarkable. Bony thorax appears normal. Visualized upper abdomen is unremarkable.     No acute cardiopulmonary process.       CBC:   Recent Labs     05/29/24  1145 05/30/24  1109 05/31/24  1134   WBC 6.4 7.3 5.8   HGB 8.3* 8.8* 9.5*     217 231       BMP:    Recent Labs     05/30/24  1109 05/30/24  2343 05/31/24  1134    136 138   K 3.0* 2.8* 3.1*    97* 100   CO2 25 25 26   BUN 7 7 6*   CREATININE 1.1 1.0 1.1   GLUCOSE 252* 178* 220*       Hepatic:   Recent Labs     05/30/24  1109 05/30/24  2343 05/31/24  1134   AST 20 19 20   ALT 17 15 16   BILITOT 0.3 0.3 <0.2   ALKPHOS 78 70 81       Lipids: No results found for: \"CHOL\", \"HDL\", \"TRIG\"  Hemoglobin A1C:   Lab Results   Component Value Date/Time    LABA1C 6.0 05/27/2024 12:09 PM     TSH: No results found for: \"TSH\"  Troponin: No results found for: \"TROPONINT\"  Lactic Acid: No results for input(s): \"LACTA\" in the last 72 hours.  BNP:   Recent Labs     05/29/24  1145 05/30/24  1109 05/31/24  1134   PROBNP 15,062* 17,688* 14,959*       UA:  Lab Results   Component Value Date/Time    NITRU Negative 05/25/2024 04:25 PM    COLORU Yellow 05/25/2024 04:25 PM    PHUR 5.5 05/25/2024 04:25 PM    PHUR 6.0 08/07/2023 12:43 PM    WBCUA 6-9 05/25/2024 04:25 PM    RBCUA 0-2 05/25/2024 04:25 PM    MUCUS 2+ 05/25/2024 04:25 PM    BACTERIA 2+ 05/25/2024 04:25 PM    CLARITYU Clear 05/25/2024 04:25 PM    LEUKOCYTESUR TRACE 05/25/2024 04:25 PM    UROBILINOGEN 0.2 05/25/2024 04:25 PM    BILIRUBINUR Negative 05/25/2024 04:25 PM    BLOODU TRACE-LYSED 05/25/2024 04:25 PM    GLUCOSEU 100 05/25/2024 04:25 PM    KETUA Negative 05/25/2024 04:25 PM     Urine Cultures:   Lab Results   Component Value Date/Time    LABURIN >100,000 CFU/ml 05/20/2024 04:18 PM     Blood Cultures:   Lab Results   Component Value Date/Time    BC No Growth after 4 days of incubation. 05/25/2024 07:03 PM     Lab Results   Component Value Date/Time    BLOODCULT2 No Growth after 4 days of incubation. 05/25/2024 07:03 PM     Organism:   Lab Results   Component Value Date/Time    ORG Escherichia coli 05/20/2024 04:18 PM         Electronically signed by Ravi Duran MD on 5/31/2024 at 1:36 PM

## 2024-05-31 NOTE — CONSULTS
CARDIOLOGY CONSULTATION        Patient Name: Shelley Maurer  Date of admission: 5/20/2024  2:51 PM  Admission Dx: Hypocalcemia [E83.51]  Hypokalemia [E87.6]  Hypomagnesemia [E83.42]  Slurred speech [R47.81]  UTI (urinary tract infection) [N39.0]  Generalized abdominal pain [R10.84]  Prolonged Q-T interval on ECG [R94.31]  Acute cystitis with hematuria [N30.01]  Wound of buttock, unspecified laterality, initial encounter [S31.028Y]  Altered mental status, unspecified altered mental status type [R41.82]  Requesting Physician: Dario Segovia DO  Primary Care physician: Christina Montague MD    Reason for Consultation/Chief Complaint: Congestive heart failure     History of Present Illness:     Shelley Maurer is a 79 y.o. patient with a prior medical history notable for diabetes mellitus, hyperlipidemia, who presented to the hospital 5/20 with complaints of altered mental status.    Course: Patient has been treated for acute toxic respiratory failure, right lower lobe pneumonia, UTI and altered mentation.  Concern for development of decompensated congestive heart failure.  Echocardiogram performed which showed EF 40-45%, mild anteroseptal hypokinesis.  No prior comparison.  Most recent labs show proBNP 15,000 from 17,688 5/30.  Persistent low potassium/magnesium, creatinine stable at 1.1 with GFR 50.    Patient is evaluated today.  Notes admitted for pneumonia, UTI, confusion which are all improved.  She feels dehydrated.  Having trouble swallowing which she is receiving therapies for.  Has plan to go to [facility] due to persistent weakness following this prolonged hospitalization.  She notes she was struggling with some dyspnea with exertion 1 to 2 weeks prior to admission.  She has persistent cough but no longer productive.  No fevers.  No angina/chest heaviness or pressure prior to presentation.  No palpitations.  No dizziness today.  Denies lower extremity edema.  No orthopnea or PND.        Past Medical History:   has a past medical history of Meredith esophagus, Bladder prolapse, Depression, Diabetes mellitus (HCC), Hyperlipidemia, Osteoporosis, and Tachycardia.    Surgical History:   has a past surgical history that includes Wrist surgery; Toe Surgery; bladder suspension; Hysterectomy; Cholecystectomy; Nose surgery; back surgery; Upper gastrointestinal endoscopy (2/11/13); Upper gastrointestinal endoscopy (5-3-16); and Colonoscopy (5-3-16).     Social History:   reports that she has been smoking cigarettes. She does not have any smokeless tobacco history on file. She reports current alcohol use of about 2.0 standard drinks of alcohol per week.   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    Family History:  family history includes Cancer in her sister; Diabetes in her father and sister.      Home Medications:  Were reviewed and are listed in nursing record and/or below  Prior to Admission medications    Medication Sig Start Date End Date Taking? Authorizing Provider   atorvastatin (LIPITOR) 40 MG tablet Take 1 tablet by mouth daily 4/27/23   Suha Schumacher MD   fluticasone (FLONASE) 50 MCG/ACT nasal spray SPRAY TWO SPRAYS IN EACH NOSTRIL ONCE DAILY 8/3/20   Suha Schumacher MD   ketoconazole (NIZORAL) 2 % cream apply to affected areas at corners of mouth 2-4 times per day as needed 10/6/20   Suha Schumacher MD   LORazepam (ATIVAN) 0.5 MG tablet Take 1 tablet by mouth daily as needed. 10/25/20   Suha Schumacher MD   valACYclovir (VALTREX) 1 g tablet  4/16/20   Suha Schumacher MD   meclizine (ANTIVERT) 12.5 MG tablet 12.5 mg 3 TIMES DAILY (route: oral) 8/24/23   Suha Schumacher MD   Magnesium 200 MG CHEW Take 1 tablet by mouth daily 8/7/23   Junior Smith PA   vitamin D (ERGOCALCIFEROL) 49162 UNITS CAPS capsule Take 1 capsule by mouth once a week    Suha Schumacher MD   simvastatin (ZOCOR) 20 MG tablet Take  dysfunction with normal LAP.    Left Atrium: Left atrium is mildly dilated.    Aortic Valve: Not well visualized. Appears to have mild sclerosis of the aortic valve cusps.    Mitral Valve: Mildly thickened leaflet. Mildly calcified leaflet. Mild annular calcification at the posterior leaflet of the mitral valve.    Trace mitral and tricuspid regurgitation. The estimated RVSP is 22 mmHg.        Impression and Plan:      Cardiomyopathy, undifferentiated   -Stop Lasix, patient does not appear decompensated at this time and actually appears volume deplete.   -I suspect her cardiomyopathy may be ischemic as she does have regional wall motion abnormality-septal hypokinesis.  No angina by history however, mild LV dysfunction only, no decompensated heart failure at this time.  Continued medical management.She is presently receiving metoprolol succinate 100 mg once daily and, and low-dose lisinopril.  Stop lisinopril, allow 48-hour washout, start Entresto 24-26 twice daily.  I would continue this regimen until follow-up.   -Replete potassium/magnesium aggressively.  This is likely driven by diuresis.   -Suspect elevated proBNP is driven by recent pneumonia which will resolve slowly with time.    Elevated troponin   -Initially 70s on admission with downtrend.  Suspect acute myocardial injury likely superimposed on chronic ischemic heart disease.  She has not been no unstable chest pain syndrome.  Medically manage as above as she appears frail and in no condition for ischemic workup.  -Maintained on aspirin and statin as well as beta-blocker   -Reassess outpatient, consider stress testing for further risk stratification at that time    Acute cephalopathy  Right lower lobe pneumonia  UTI   -Remains on antibiotic therapy    Diabetes mellitus type 2  -goal A1c less than 7.0  Hyperlipidemia-goal LDL less than 70      Reassess outpatient 2 to 3 weeks here at [place].  Will arrange follow-up.  Cardiology service will sign off as no

## 2024-05-31 NOTE — CONSULTS
Consult placed  Consult placed to on call cardiologist via perfect serve  Consult placed on 5/31 @1056  Sharon Amor

## 2024-05-31 NOTE — PROGRESS NOTES
Vitals:    05/31/24 1142   BP: (!) 144/81   Pulse: 79   Resp: 16   Temp: 98.1 °F (36.7 °C)   SpO2: 95%     Patient's VSS, K replacement infusing. Patient given imodium for diarrhea. Patient very pale. Echo abnormal, and waiting on cardiology. Patient calls appropriately. Will monitor.

## 2024-06-01 LAB
ALBUMIN SERPL-MCNC: 3 G/DL (ref 3.4–5)
ANION GAP SERPL CALCULATED.3IONS-SCNC: 15 MMOL/L (ref 3–16)
BUN SERPL-MCNC: 7 MG/DL (ref 7–20)
CALCIUM SERPL-MCNC: 8.3 MG/DL (ref 8.3–10.6)
CHLORIDE SERPL-SCNC: 100 MMOL/L (ref 99–110)
CO2 SERPL-SCNC: 23 MMOL/L (ref 21–32)
CREAT SERPL-MCNC: 0.9 MG/DL (ref 0.6–1.2)
GFR SERPLBLD CREATININE-BSD FMLA CKD-EPI: 64 ML/MIN/{1.73_M2}
GLUCOSE BLD-MCNC: 114 MG/DL (ref 70–99)
GLUCOSE BLD-MCNC: 179 MG/DL (ref 70–99)
GLUCOSE BLD-MCNC: 191 MG/DL (ref 70–99)
GLUCOSE BLD-MCNC: 212 MG/DL (ref 70–99)
GLUCOSE SERPL-MCNC: 205 MG/DL (ref 70–99)
PERFORMED ON: ABNORMAL
PHOSPHATE SERPL-MCNC: 3.1 MG/DL (ref 2.5–4.9)
POTASSIUM SERPL-SCNC: 3.3 MMOL/L (ref 3.5–5.1)
SODIUM SERPL-SCNC: 138 MMOL/L (ref 136–145)

## 2024-06-01 PROCEDURE — 1200000000 HC SEMI PRIVATE

## 2024-06-01 PROCEDURE — 36415 COLL VENOUS BLD VENIPUNCTURE: CPT

## 2024-06-01 PROCEDURE — 80069 RENAL FUNCTION PANEL: CPT

## 2024-06-01 PROCEDURE — 6360000002 HC RX W HCPCS: Performed by: INTERNAL MEDICINE

## 2024-06-01 PROCEDURE — 6360000002 HC RX W HCPCS: Performed by: STUDENT IN AN ORGANIZED HEALTH CARE EDUCATION/TRAINING PROGRAM

## 2024-06-01 PROCEDURE — 6370000000 HC RX 637 (ALT 250 FOR IP): Performed by: INTERNAL MEDICINE

## 2024-06-01 PROCEDURE — 6370000000 HC RX 637 (ALT 250 FOR IP): Performed by: NURSE PRACTITIONER

## 2024-06-01 PROCEDURE — 6370000000 HC RX 637 (ALT 250 FOR IP): Performed by: STUDENT IN AN ORGANIZED HEALTH CARE EDUCATION/TRAINING PROGRAM

## 2024-06-01 PROCEDURE — 2580000003 HC RX 258: Performed by: STUDENT IN AN ORGANIZED HEALTH CARE EDUCATION/TRAINING PROGRAM

## 2024-06-01 PROCEDURE — 2580000003 HC RX 258: Performed by: INTERNAL MEDICINE

## 2024-06-01 PROCEDURE — 6370000000 HC RX 637 (ALT 250 FOR IP): Performed by: HOSPITALIST

## 2024-06-01 PROCEDURE — C9113 INJ PANTOPRAZOLE SODIUM, VIA: HCPCS | Performed by: STUDENT IN AN ORGANIZED HEALTH CARE EDUCATION/TRAINING PROGRAM

## 2024-06-01 RX ORDER — HYDROCODONE BITARTRATE AND ACETAMINOPHEN 5; 325 MG/1; MG/1
1 TABLET ORAL EVERY 6 HOURS PRN
Status: DISCONTINUED | OUTPATIENT
Start: 2024-06-01 | End: 2024-06-03 | Stop reason: HOSPADM

## 2024-06-01 RX ORDER — DIPHENHYDRAMINE HCL 25 MG
50 TABLET ORAL ONCE
Status: COMPLETED | OUTPATIENT
Start: 2024-06-01 | End: 2024-06-01

## 2024-06-01 RX ADMIN — CLOTRIMAZOLE 10 MG: 10 LOZENGE ORAL at 15:14

## 2024-06-01 RX ADMIN — ASPIRIN 81 MG 81 MG: 81 TABLET ORAL at 08:23

## 2024-06-01 RX ADMIN — CLOTRIMAZOLE 10 MG: 10 LOZENGE ORAL at 08:21

## 2024-06-01 RX ADMIN — PANTOPRAZOLE SODIUM 40 MG: 40 INJECTION, POWDER, FOR SOLUTION INTRAVENOUS at 20:34

## 2024-06-01 RX ADMIN — CLOTRIMAZOLE 10 MG: 10 LOZENGE ORAL at 00:10

## 2024-06-01 RX ADMIN — POTASSIUM BICARBONATE 40 MEQ: 782 TABLET, EFFERVESCENT ORAL at 13:02

## 2024-06-01 RX ADMIN — ENOXAPARIN SODIUM 40 MG: 100 INJECTION SUBCUTANEOUS at 08:23

## 2024-06-01 RX ADMIN — DIPHENHYDRAMINE HYDROCHLORIDE 50 MG: 25 TABLET ORAL at 20:35

## 2024-06-01 RX ADMIN — LOPERAMIDE HYDROCHLORIDE 2 MG: 2 CAPSULE ORAL at 04:35

## 2024-06-01 RX ADMIN — SODIUM CHLORIDE 20 ML/HR: 9 INJECTION, SOLUTION INTRAVENOUS at 12:46

## 2024-06-01 RX ADMIN — FLUTICASONE PROPIONATE 2 SPRAY: 50 SPRAY, METERED NASAL at 08:24

## 2024-06-01 RX ADMIN — INSULIN LISPRO 1 UNITS: 100 INJECTION, SOLUTION INTRAVENOUS; SUBCUTANEOUS at 12:21

## 2024-06-01 RX ADMIN — INSULIN GLARGINE 15 UNITS: 100 INJECTION, SOLUTION SUBCUTANEOUS at 20:35

## 2024-06-01 RX ADMIN — PIPERACILLIN AND TAZOBACTAM 3375 MG: 3; .375 INJECTION, POWDER, LYOPHILIZED, FOR SOLUTION INTRAVENOUS at 04:27

## 2024-06-01 RX ADMIN — SODIUM CHLORIDE, PRESERVATIVE FREE 10 ML: 5 INJECTION INTRAVENOUS at 08:23

## 2024-06-01 RX ADMIN — LOPERAMIDE HYDROCHLORIDE 2 MG: 2 CAPSULE ORAL at 15:14

## 2024-06-01 RX ADMIN — HYDROCODONE BITARTRATE AND ACETAMINOPHEN 1 TABLET: 5; 325 TABLET ORAL at 15:14

## 2024-06-01 RX ADMIN — ACETAMINOPHEN 650 MG: 325 TABLET ORAL at 04:35

## 2024-06-01 RX ADMIN — ATORVASTATIN CALCIUM 40 MG: 40 TABLET, FILM COATED ORAL at 08:23

## 2024-06-01 RX ADMIN — Medication 10 ML: at 08:24

## 2024-06-01 RX ADMIN — Medication 10 ML: at 08:22

## 2024-06-01 RX ADMIN — CLOTRIMAZOLE 10 MG: 10 LOZENGE ORAL at 12:22

## 2024-06-01 RX ADMIN — PANTOPRAZOLE SODIUM 40 MG: 40 INJECTION, POWDER, FOR SOLUTION INTRAVENOUS at 08:21

## 2024-06-01 RX ADMIN — OXYBUTYNIN CHLORIDE 15 MG: 5 TABLET, EXTENDED RELEASE ORAL at 08:22

## 2024-06-01 RX ADMIN — METOPROLOL SUCCINATE 100 MG: 50 TABLET, EXTENDED RELEASE ORAL at 08:22

## 2024-06-01 RX ADMIN — SODIUM CHLORIDE, PRESERVATIVE FREE 20 ML: 5 INJECTION INTRAVENOUS at 20:38

## 2024-06-01 RX ADMIN — PIPERACILLIN AND TAZOBACTAM 3375 MG: 3; .375 INJECTION, POWDER, LYOPHILIZED, FOR SOLUTION INTRAVENOUS at 12:47

## 2024-06-01 RX ADMIN — CLOTRIMAZOLE 10 MG: 10 LOZENGE ORAL at 20:34

## 2024-06-01 ASSESSMENT — PAIN DESCRIPTION - LOCATION
LOCATION: HIP;THROAT
LOCATION: HIP
LOCATION: HIP

## 2024-06-01 ASSESSMENT — PAIN SCALES - GENERAL
PAINLEVEL_OUTOF10: 8
PAINLEVEL_OUTOF10: 3
PAINLEVEL_OUTOF10: 3
PAINLEVEL_OUTOF10: 5

## 2024-06-01 ASSESSMENT — PAIN DESCRIPTION - ONSET: ONSET: ON-GOING

## 2024-06-01 ASSESSMENT — PAIN DESCRIPTION - DESCRIPTORS
DESCRIPTORS: ACHING
DESCRIPTORS: ACHING;SORE

## 2024-06-01 ASSESSMENT — PAIN DESCRIPTION - PAIN TYPE
TYPE: ACUTE PAIN
TYPE: ACUTE PAIN

## 2024-06-01 ASSESSMENT — PAIN DESCRIPTION - ORIENTATION
ORIENTATION: RIGHT

## 2024-06-01 ASSESSMENT — PAIN DESCRIPTION - FREQUENCY: FREQUENCY: CONTINUOUS

## 2024-06-01 ASSESSMENT — PAIN - FUNCTIONAL ASSESSMENT: PAIN_FUNCTIONAL_ASSESSMENT: PREVENTS OR INTERFERES SOME ACTIVE ACTIVITIES AND ADLS

## 2024-06-01 ASSESSMENT — PAIN SCALES - WONG BAKER: WONGBAKER_NUMERICALRESPONSE: NO HURT

## 2024-06-01 NOTE — PROGRESS NOTES
06/01/24 1201   Encounter Summary   Encounter Overview/Reason Initial Encounter   Last Encounter    (6/1: daughter requested visit for patient for communion, listening as patient likely will need to make significant life decisions in coming days)   Begin Time 1155   End Time  1203   Total Time Calculated 8 min     Thank you for consulting Spiritual Health    If you would like a 's presence for emotional, spiritual, grief or comfort care,   please dial \"[phone]\" and ask for the  on-call to be paged.    For help with Advanced Care Planning, Power of  for Healthcare or Living Will forms, you may also call us directly:    [phone] ([phone]) Reuben  [phone] ([phone]) Audie  [phone] ([phone]) Outpatient    [facility]  [facility]

## 2024-06-01 NOTE — PLAN OF CARE
Problem: Safety - Adult  Goal: Free from fall injury  6/1/2024 0214 by Tanna Bravo RN  Outcome: Progressing  Flowsheets (Taken 6/1/2024 0200)  Free From Fall Injury: Instruct family/caregiver on patient safety     Problem: Discharge Planning  Goal: Discharge to home or other facility with appropriate resources  6/1/2024 0214 by Tanna Bravo RN  Outcome: Progressing     Problem: Pain  Goal: Verbalizes/displays adequate comfort level or baseline comfort level  6/1/2024 0214 by Tanna Bravo RN  Outcome: Progressing     Problem: Confusion  Goal: Confusion, delirium, dementia, or psychosis is improved or at baseline  Description: INTERVENTIONS:  1. Assess for possible contributors to thought disturbance, including medications, impaired vision or hearing, underlying metabolic abnormalities, dehydration, psychiatric diagnoses, and notify attending LIP  2. [place] high risk fall precautions, as indicated  3. Provide frequent short contacts to provide reality reorientation, refocusing and direction  4. Decrease environmental stimuli, including noise as appropriate  5. Monitor and intervene to maintain adequate nutrition, hydration, elimination, sleep and activity  6. If unable to ensure safety without constant attention obtain sitter and review sitter guidelines with assigned personnel  7. Initiate Psychosocial CNS and Spiritual Care consult, as indicated  6/1/2024 0214 by Tanna Bravo RN  Outcome: Progressing     Problem: Nutrition Deficit:  Goal: Optimize nutritional status  5/31/2024 1325 by Tammy Mccray RN  Outcome: Progressing     Continue with plan of care.

## 2024-06-01 NOTE — PROGRESS NOTES
V2.0    [facility] Progress Note      Name:  Shelley Maurer /Age/Sex: 1945  (79 y.o. female)   MRN & CSN:  [id number] & [id number] Encounter Date/Time: 2024 10:24 AM EDT   Location:  [facility]/0541-01 PCP: Christina Montague MD     Attending:Roderick Kern MD       Hospital Day: 13    Assessment and Recommendations     Patient is an 79-year-old  female with past medical history of diabetes, hyperlipidemia, hypertension who was admitted for mental status and found to have acute hypoxic respiratory failure due to pneumonia.    #.  Acute hypoxic respiratory failure: Resolved  Continue bronchodilators  Currently on room air    #.  Right lower lobe pneumonia  Improving  Last dose of IV antibiotics today    #.  Diastolic congestive heart failure: Appears volume compensated  Monitor off of IV diuretics since patient appears volume compensated  Status post cardiology evaluation: Will need to follow-up outpatient with cardiology    #.  Diarrhea  Suspect medication induced  Doubt C. Difficile    #.  Diabetes mellitus, type with hyperglycemia  Continue current insulin regimen    #.  Altered mental status  Suspect metabolic encephalopathy  Improving    #.  Other medical problem include elevated troponin due to demand ischemia, hyperlipidemia      DVT Prophylaxis: Lovenox.   Code Status: Total Support.   Barrier to DC: Check electrolytes/renal function today.  Patient will need pre-CERT for SNF placement.  Getting medically ready for discharge.          Subjective:     Patient was seen and examined today.  Patient states she is feeling unwell.  No acute events overnight.  Patient reena afebrile with stable vital signs.    Review of Systems:      Pertinent positives and negatives discussed in HPI    Objective:     Intake/Output Summary (Last 24 hours) at 2024 1024  Last data filed at 2024 0648  Gross per 24 hour   Intake 540 ml   Output 2200 ml   Net -1660 ml      Vitals:   Vitals:    24 0505 24  0520 06/01/24 0558 06/01/24 0822   BP:    (!) 156/93   Pulse:    85   Resp: 12      Temp:       TempSrc:       SpO2:       Weight:  64.2 kg (141 lb 8.6 oz) 58.9 kg (129 lb 14.4 oz)    Height:             Physical Exam:      General: awake, alert, in NAD  Eyes: EOMI  ENT: neck supple  Cardiovascular: Regular rate.  Respiratory: Clear to auscultation  Gastrointestinal: Soft, non tender, BS+  Genitourinary: no suprapubic tenderness  Musculoskeletal: No edema  Skin: warm, dry  Neuro: Alert alert, Cranial nerves intack, no focal motor or sensory deficits.   Psych: Mood appropriate.         Medications:   Medications:    aspirin  81 mg Oral Daily    [START ON 6/2/2024] sacubitril-valsartan  1 tablet Oral BID    clotrimazole  10 mg Oral 5x Daily    pantoprazole  40 mg IntraVENous BID    polyethylene glycol  17 g Oral Daily    insulin lispro  0-4 Units SubCUTAneous TID WC    insulin lispro  0-4 Units SubCUTAneous Nightly    bisacodyl  10 mg Rectal Daily    metoprolol succinate  100 mg Oral Daily    insulin glargine  0.25 Units/kg SubCUTAneous Nightly    piperacillin-tazobactam  3,375 mg IntraVENous Q8H    melatonin  5 mg Oral Nightly    atorvastatin  40 mg Oral Daily    fluticasone  2 spray Each Nostril Daily    [Held by provider] pantoprazole  40 mg Oral QAM AC    oxyBUTYnin  15 mg Oral Daily    lidocaine  1 patch TransDERmal Daily    sodium chloride flush  5-40 mL IntraVENous 2 times per day    enoxaparin  40 mg SubCUTAneous Daily      Infusions:    sodium chloride      dextrose      sodium chloride 50 mL/hr at 05/27/24 0549     PRN Meds: HYDROcodone 5 mg - acetaminophen, 1 tablet, Q6H PRN  loperamide, 2 mg, 4x Daily PRN  sodium chloride, , PRN  glucose, 4 tablet, PRN  dextrose bolus, 125 mL, PRN   Or  dextrose bolus, 250 mL, PRN  glucagon (rDNA), 1 mg, PRN  dextrose, , Continuous PRN  guaiFENesin-dextromethorphan, 5 mL, Q4H PRN  sodium chloride flush, 5-40 mL, PRN  sodium chloride, , PRN  ondansetron, 4 mg, Q8H PRN    Results   Component Value Date/Time    NITRU Negative 05/25/2024 04:25 PM    COLORU Yellow 05/25/2024 04:25 PM    PHUR 5.5 05/25/2024 04:25 PM    PHUR 6.0 08/07/2023 12:43 PM    WBCUA 6-9 05/25/2024 04:25 PM    RBCUA 0-2 05/25/2024 04:25 PM    MUCUS 2+ 05/25/2024 04:25 PM    BACTERIA 2+ 05/25/2024 04:25 PM    CLARITYU Clear 05/25/2024 04:25 PM    LEUKOCYTESUR TRACE 05/25/2024 04:25 PM    UROBILINOGEN 0.2 05/25/2024 04:25 PM    BILIRUBINUR Negative 05/25/2024 04:25 PM    BLOODU TRACE-LYSED 05/25/2024 04:25 PM    GLUCOSEU 100 05/25/2024 04:25 PM    KETUA Negative 05/25/2024 04:25 PM     Urine Cultures:   Lab Results   Component Value Date/Time    LABURIN >100,000 CFU/ml 05/20/2024 04:18 PM     Blood Cultures:   Lab Results   Component Value Date/Time    BC No Growth after 4 days of incubation. 05/25/2024 07:03 PM     Lab Results   Component Value Date/Time    BLOODCULT2 No Growth after 4 days of incubation. 05/25/2024 07:03 PM     Organism:   Lab Results   Component Value Date/Time    ORG Escherichia coli 05/20/2024 04:18 PM         Electronically signed by Roderick Kern MD on 6/1/2024 at 10:24 AM

## 2024-06-02 LAB
ANION GAP SERPL CALCULATED.3IONS-SCNC: 16 MMOL/L (ref 3–16)
BASOPHILS # BLD: 0.1 K/UL (ref 0–0.2)
BASOPHILS NFR BLD: 0.8 %
BUN SERPL-MCNC: 7 MG/DL (ref 7–20)
CALCIUM SERPL-MCNC: 8.7 MG/DL (ref 8.3–10.6)
CHLORIDE SERPL-SCNC: 97 MMOL/L (ref 99–110)
CO2 SERPL-SCNC: 23 MMOL/L (ref 21–32)
CREAT SERPL-MCNC: 0.9 MG/DL (ref 0.6–1.2)
DEPRECATED RDW RBC AUTO: 18.6 % (ref 12.4–15.4)
EOSINOPHIL # BLD: 0.2 K/UL (ref 0–0.6)
EOSINOPHIL NFR BLD: 2.9 %
GFR SERPLBLD CREATININE-BSD FMLA CKD-EPI: 64 ML/MIN/{1.73_M2}
GLUCOSE BLD-MCNC: 115 MG/DL (ref 70–99)
GLUCOSE BLD-MCNC: 166 MG/DL (ref 70–99)
GLUCOSE BLD-MCNC: 209 MG/DL (ref 70–99)
GLUCOSE BLD-MCNC: 335 MG/DL (ref 70–99)
GLUCOSE SERPL-MCNC: 108 MG/DL (ref 70–99)
HCT VFR BLD AUTO: 31.7 % (ref 36–48)
HGB BLD-MCNC: 10.1 G/DL (ref 12–16)
LYMPHOCYTES # BLD: 1.4 K/UL (ref 1–5.1)
LYMPHOCYTES NFR BLD: 18.7 %
MAGNESIUM SERPL-MCNC: 1.4 MG/DL (ref 1.8–2.4)
MCH RBC QN AUTO: 25.4 PG (ref 26–34)
MCHC RBC AUTO-ENTMCNC: 31.9 G/DL (ref 31–36)
MCV RBC AUTO: 79.6 FL (ref 80–100)
MONOCYTES # BLD: 0.4 K/UL (ref 0–1.3)
MONOCYTES NFR BLD: 6.1 %
NEUTROPHILS # BLD: 5.2 K/UL (ref 1.7–7.7)
NEUTROPHILS NFR BLD: 71.5 %
PERFORMED ON: ABNORMAL
PHOSPHATE SERPL-MCNC: 3.8 MG/DL (ref 2.5–4.9)
PLATELET # BLD AUTO: 287 K/UL (ref 135–450)
PMV BLD AUTO: 8.5 FL (ref 5–10.5)
POTASSIUM SERPL-SCNC: 3.8 MMOL/L (ref 3.5–5.1)
RBC # BLD AUTO: 3.98 M/UL (ref 4–5.2)
SODIUM SERPL-SCNC: 136 MMOL/L (ref 136–145)
WBC # BLD AUTO: 7.3 K/UL (ref 4–11)

## 2024-06-02 PROCEDURE — 6360000002 HC RX W HCPCS: Performed by: STUDENT IN AN ORGANIZED HEALTH CARE EDUCATION/TRAINING PROGRAM

## 2024-06-02 PROCEDURE — 84100 ASSAY OF PHOSPHORUS: CPT

## 2024-06-02 PROCEDURE — 85025 COMPLETE CBC W/AUTO DIFF WBC: CPT

## 2024-06-02 PROCEDURE — 80048 BASIC METABOLIC PNL TOTAL CA: CPT

## 2024-06-02 PROCEDURE — 6370000000 HC RX 637 (ALT 250 FOR IP): Performed by: STUDENT IN AN ORGANIZED HEALTH CARE EDUCATION/TRAINING PROGRAM

## 2024-06-02 PROCEDURE — 6370000000 HC RX 637 (ALT 250 FOR IP): Performed by: INTERNAL MEDICINE

## 2024-06-02 PROCEDURE — C9113 INJ PANTOPRAZOLE SODIUM, VIA: HCPCS | Performed by: STUDENT IN AN ORGANIZED HEALTH CARE EDUCATION/TRAINING PROGRAM

## 2024-06-02 PROCEDURE — 6370000000 HC RX 637 (ALT 250 FOR IP): Performed by: HOSPITALIST

## 2024-06-02 PROCEDURE — 2580000003 HC RX 258: Performed by: STUDENT IN AN ORGANIZED HEALTH CARE EDUCATION/TRAINING PROGRAM

## 2024-06-02 PROCEDURE — 83735 ASSAY OF MAGNESIUM: CPT

## 2024-06-02 PROCEDURE — 6370000000 HC RX 637 (ALT 250 FOR IP): Performed by: NURSE PRACTITIONER

## 2024-06-02 PROCEDURE — 36415 COLL VENOUS BLD VENIPUNCTURE: CPT

## 2024-06-02 PROCEDURE — 1200000000 HC SEMI PRIVATE

## 2024-06-02 RX ORDER — ASPIRIN 81 MG/1
81 TABLET, CHEWABLE ORAL DAILY
Qty: 30 TABLET | Refills: 3 | Status: SHIPPED | OUTPATIENT
Start: 2024-06-03

## 2024-06-02 RX ADMIN — PANTOPRAZOLE SODIUM 40 MG: 40 INJECTION, POWDER, FOR SOLUTION INTRAVENOUS at 08:11

## 2024-06-02 RX ADMIN — INSULIN GLARGINE 15 UNITS: 100 INJECTION, SOLUTION SUBCUTANEOUS at 20:35

## 2024-06-02 RX ADMIN — ACETAMINOPHEN 650 MG: 325 TABLET ORAL at 08:10

## 2024-06-02 RX ADMIN — PANTOPRAZOLE SODIUM 40 MG: 40 INJECTION, POWDER, FOR SOLUTION INTRAVENOUS at 20:36

## 2024-06-02 RX ADMIN — ENOXAPARIN SODIUM 40 MG: 100 INJECTION SUBCUTANEOUS at 08:12

## 2024-06-02 RX ADMIN — FLUTICASONE PROPIONATE 2 SPRAY: 50 SPRAY, METERED NASAL at 08:13

## 2024-06-02 RX ADMIN — CLOTRIMAZOLE 10 MG: 10 LOZENGE ORAL at 23:01

## 2024-06-02 RX ADMIN — CLOTRIMAZOLE 10 MG: 10 LOZENGE ORAL at 03:16

## 2024-06-02 RX ADMIN — SACUBITRIL AND VALSARTAN 1 TABLET: 24; 26 TABLET, FILM COATED ORAL at 20:35

## 2024-06-02 RX ADMIN — CLOTRIMAZOLE 10 MG: 10 LOZENGE ORAL at 19:05

## 2024-06-02 RX ADMIN — CLOTRIMAZOLE 10 MG: 10 LOZENGE ORAL at 12:26

## 2024-06-02 RX ADMIN — SODIUM CHLORIDE, PRESERVATIVE FREE 10 ML: 5 INJECTION INTRAVENOUS at 08:12

## 2024-06-02 RX ADMIN — HYDROCODONE BITARTRATE AND ACETAMINOPHEN 1 TABLET: 5; 325 TABLET ORAL at 23:01

## 2024-06-02 RX ADMIN — CLOTRIMAZOLE 10 MG: 10 LOZENGE ORAL at 08:11

## 2024-06-02 RX ADMIN — ATORVASTATIN CALCIUM 40 MG: 40 TABLET, FILM COATED ORAL at 08:11

## 2024-06-02 RX ADMIN — CLOTRIMAZOLE 10 MG: 10 LOZENGE ORAL at 16:09

## 2024-06-02 RX ADMIN — OXYBUTYNIN CHLORIDE 15 MG: 5 TABLET, EXTENDED RELEASE ORAL at 08:11

## 2024-06-02 RX ADMIN — ASPIRIN 81 MG 81 MG: 81 TABLET ORAL at 08:11

## 2024-06-02 RX ADMIN — Medication 5 MG: at 20:35

## 2024-06-02 RX ADMIN — INSULIN LISPRO 4 UNITS: 100 INJECTION, SOLUTION INTRAVENOUS; SUBCUTANEOUS at 20:36

## 2024-06-02 RX ADMIN — SODIUM CHLORIDE, PRESERVATIVE FREE 10 ML: 5 INJECTION INTRAVENOUS at 20:37

## 2024-06-02 RX ADMIN — GUAIFENESIN AND DEXTROMETHORPHAN 5 ML: 100; 10 SYRUP ORAL at 08:11

## 2024-06-02 RX ADMIN — SACUBITRIL AND VALSARTAN 1 TABLET: 24; 26 TABLET, FILM COATED ORAL at 08:11

## 2024-06-02 RX ADMIN — MAGNESIUM SULFATE HEPTAHYDRATE 2000 MG: 40 INJECTION, SOLUTION INTRAVENOUS at 09:32

## 2024-06-02 RX ADMIN — METOPROLOL SUCCINATE 100 MG: 50 TABLET, EXTENDED RELEASE ORAL at 08:11

## 2024-06-02 RX ADMIN — SODIUM CHLORIDE: 9 INJECTION, SOLUTION INTRAVENOUS at 09:31

## 2024-06-02 ASSESSMENT — PAIN DESCRIPTION - ORIENTATION
ORIENTATION: RIGHT

## 2024-06-02 ASSESSMENT — PAIN DESCRIPTION - LOCATION
LOCATION: HIP

## 2024-06-02 ASSESSMENT — PAIN DESCRIPTION - DESCRIPTORS
DESCRIPTORS: ACHING
DESCRIPTORS: THROBBING
DESCRIPTORS: ACHING

## 2024-06-02 ASSESSMENT — PAIN SCALES - GENERAL
PAINLEVEL_OUTOF10: 8
PAINLEVEL_OUTOF10: 5
PAINLEVEL_OUTOF10: 8

## 2024-06-02 ASSESSMENT — PAIN DESCRIPTION - PAIN TYPE: TYPE: ACUTE PAIN

## 2024-06-02 NOTE — CARE COORDINATION
Aware of dc order. Needs new cert started in [employer], needs new PT/OT notes to start new cert. Everardo from [facility] checking status of CPAN    Addendum: Everardo aware of dc order, informed him will start cert when PT note in

## 2024-06-02 NOTE — PROGRESS NOTES
V2.0    [facility] Progress Note      Name:  Shelley Maurer /Age/Sex: 1945  (79 y.o. female)   MRN & CSN:  [id number] & [id number] Encounter Date/Time: 2024 10:24 AM EDT   Location:  [facility]/0541-01 PCP: Christina Montague MD     Attending:Roderick Kern MD       Hospital Day: 14    Assessment and Recommendations     Patient is an 79-year-old  female with past medical history of diabetes, hyperlipidemia, hypertension who was admitted for mental status and found to have acute hypoxic respiratory failure due to pneumonia.    #.  Acute hypoxic respiratory failure: Resolved  Continue bronchodilators  Currently on room air    #.  Right lower lobe pneumonia  Improving  Completed antibiotics.     #.  Diastolic congestive heart failure: Appears volume compensated  Monitor off of IV diuretics since patient appears volume compensated  Status post cardiology evaluation: Will need to follow-up outpatient with cardiology    #.  Diarrhea  Suspect medication induced  Doubt C. Difficile    #.  Diabetes mellitus, type with hyperglycemia  Continue current insulin regimen    #.  Altered mental status  Suspect metabolic encephalopathy  Improving    #.  Other medical problem include elevated troponin due to demand ischemia, hyperlipidemia      DVT Prophylaxis: Lovenox.   Code Status: Total Support.   Barrier to DC: DC order placed today. Awaiting placement.           Subjective:     Patient was seen and examined today.  Patient states she is feeling unwell.  No acute events overnight.  Patient reena afebrile with stable vital signs.    Review of Systems:      Pertinent positives and negatives discussed in HPI    Objective:     Intake/Output Summary (Last 24 hours) at 2024 1029  Last data filed at 2024 0644  Gross per 24 hour   Intake 216.27 ml   Output 1500 ml   Net -1283.73 ml        Vitals:   Vitals:    24 0321 24 0327 24 0635 24 0805   BP: 136/67 138/78  (!) 145/88   Pulse: 90 99  84  fat and tiny focus of gas in the right lower subcutaneous fat is likely due to medication injection site.  No acute osseous abnormality identified.     1. Interval development of patchy opacities in the right lower lobe, as compared to prior CT. This may represent infiltrate. 2. Findings suggestive of mild small bowel ileus. 3. Moderate amount stool burden, predominantly in the proximal colon. 4. Additional chronic and benign findings, as described.     XR CHEST PORTABLE    Result Date: 5/25/2024  EXAMINATION: ONE XRAY VIEW OF THE CHEST 5/25/2024 3:01 pm COMPARISON: 05/23/2024 chest plain film. HISTORY: ORDERING SYSTEM PROVIDED HISTORY: SOB TECHNOLOGIST PROVIDED HISTORY: Reason for exam:->SOB FINDINGS & IMPRESSION: Subtle opacity at the lateral left lung base could be pneumonia in the correct clinical setting. No significant pleural effusion. No significant pulmonary vascular congestion. No visible pneumothorax. Cardiac silhouette normal in size.       CBC:   Recent Labs     05/30/24  1109 05/31/24  1134 06/02/24  0802   WBC 7.3 5.8 7.3   HGB 8.8* 9.5* 10.1*    231 287       BMP:    Recent Labs     05/31/24  1134 06/01/24  1021 06/02/24  0802    138 136   K 3.1* 3.3* 3.8    100 97*   CO2 26 23 23   BUN 6* 7 7   CREATININE 1.1 0.9 0.9   GLUCOSE 220* 205* 108*       Hepatic:   Recent Labs     05/30/24  1109 05/30/24  2343 05/31/24  1134   AST 20 19 20   ALT 17 15 16   BILITOT 0.3 0.3 <0.2   ALKPHOS 78 70 81       Lipids: No results found for: \"CHOL\", \"HDL\", \"TRIG\"  Hemoglobin A1C:   Lab Results   Component Value Date/Time    LABA1C 6.0 05/27/2024 12:09 PM     TSH: No results found for: \"TSH\"  Troponin: No results found for: \"TROPONINT\"  Lactic Acid: No results for input(s): \"LACTA\" in the last 72 hours.  BNP:   Recent Labs     05/30/24  1109 05/31/24  1134   PROBNP 17,688* 14,959*       UA:  Lab Results   Component Value Date/Time    NITRU Negative 05/25/2024 04:25 PM    COLORU Yellow 05/25/2024  04:25 PM    PHUR 5.5 05/25/2024 04:25 PM    PHUR 6.0 08/07/2023 12:43 PM    WBCUA 6-9 05/25/2024 04:25 PM    RBCUA 0-2 05/25/2024 04:25 PM    MUCUS 2+ 05/25/2024 04:25 PM    BACTERIA 2+ 05/25/2024 04:25 PM    CLARITYU Clear 05/25/2024 04:25 PM    LEUKOCYTESUR TRACE 05/25/2024 04:25 PM    UROBILINOGEN 0.2 05/25/2024 04:25 PM    BILIRUBINUR Negative 05/25/2024 04:25 PM    BLOODU TRACE-LYSED 05/25/2024 04:25 PM    GLUCOSEU 100 05/25/2024 04:25 PM    KETUA Negative 05/25/2024 04:25 PM     Urine Cultures:   Lab Results   Component Value Date/Time    LABURIN >100,000 CFU/ml 05/20/2024 04:18 PM     Blood Cultures:   Lab Results   Component Value Date/Time    BC No Growth after 4 days of incubation. 05/25/2024 07:03 PM     Lab Results   Component Value Date/Time    BLOODCULT2 No Growth after 4 days of incubation. 05/25/2024 07:03 PM     Organism:   Lab Results   Component Value Date/Time    ORG Escherichia coli 05/20/2024 04:18 PM         Electronically signed by Roderick Kern MD on 6/2/2024 at 10:29 AM

## 2024-06-02 NOTE — PLAN OF CARE
Problem: Safety - Adult  Goal: Free from fall injury  Outcome: Progressing     Problem: Discharge Planning  Goal: Discharge to home or other facility with appropriate resources  Outcome: Progressing     Problem: Pain  Goal: Verbalizes/displays adequate comfort level or baseline comfort level  Outcome: Progressing     Problem: Skin/Tissue Integrity  Goal: Absence of new skin breakdown  Description: 1.  Monitor for areas of redness and/or skin breakdown  2.  Assess vascular access sites hourly  3.  Every 4-6 hours minimum:  Change oxygen saturation probe site  4.  Every 4-6 hours:  If on nasal continuous positive airway pressure, respiratory therapy assess nares and determine need for appliance change or resting period.  Outcome: Progressing     Problem: Confusion  Goal: Confusion, delirium, dementia, or psychosis is improved or at baseline  Description: INTERVENTIONS:  1. Assess for possible contributors to thought disturbance, including medications, impaired vision or hearing, underlying metabolic abnormalities, dehydration, psychiatric diagnoses, and notify attending LIP  2. [place] high risk fall precautions, as indicated  3. Provide frequent short contacts to provide reality reorientation, refocusing and direction  4. Decrease environmental stimuli, including noise as appropriate  5. Monitor and intervene to maintain adequate nutrition, hydration, elimination, sleep and activity  6. If unable to ensure safety without constant attention obtain sitter and review sitter guidelines with assigned personnel  7. Initiate Psychosocial CNS and Spiritual Care consult, as indicated  Outcome: Progressing     Problem: Nutrition Deficit:  Goal: Optimize nutritional status  Outcome: Progressing     Problem: Chronic Conditions and Co-morbidities  Goal: Patient's chronic conditions and co-morbidity symptoms are monitored and maintained or improved  Outcome: Progressing     Problem: ABCDS Injury Assessment  Goal: Absence of

## 2024-06-02 NOTE — PLAN OF CARE
Problem: Safety - Adult  Goal: Free from fall injury  6/2/2024 1055 by Teresa Foster RN  Outcome: Progressing  6/2/2024 0442 by Ann Valadez RN  Outcome: Progressing     Problem: Discharge Planning  Goal: Discharge to home or other facility with appropriate resources  6/2/2024 1055 by Teresa Foster RN  Outcome: Progressing  6/2/2024 0442 by Ann Valadez RN  Outcome: Progressing     Problem: Pain  Goal: Verbalizes/displays adequate comfort level or baseline comfort level  6/2/2024 1055 by Teresa Foster RN  Outcome: Progressing  6/2/2024 0442 by Ann Valadez RN  Outcome: Progressing     Problem: Skin/Tissue Integrity  Goal: Absence of new skin breakdown  Description: 1.  Monitor for areas of redness and/or skin breakdown  2.  Assess vascular access sites hourly  3.  Every 4-6 hours minimum:  Change oxygen saturation probe site  4.  Every 4-6 hours:  If on nasal continuous positive airway pressure, respiratory therapy assess nares and determine need for appliance change or resting period.  6/2/2024 1055 by Teresa Foster RN  Outcome: Progressing  6/2/2024 0442 by Ann Valadez RN  Outcome: Progressing     Problem: Confusion  Goal: Confusion, delirium, dementia, or psychosis is improved or at baseline  Description: INTERVENTIONS:  1. Assess for possible contributors to thought disturbance, including medications, impaired vision or hearing, underlying metabolic abnormalities, dehydration, psychiatric diagnoses, and notify attending LIP  2. [place] high risk fall precautions, as indicated  3. Provide frequent short contacts to provide reality reorientation, refocusing and direction  4. Decrease environmental stimuli, including noise as appropriate  5. Monitor and intervene to maintain adequate nutrition, hydration, elimination, sleep and activity  6. If unable to ensure safety without constant attention obtain sitter and review sitter guidelines with assigned personnel  7.  Initiate Psychosocial CNS and Spiritual Care consult, as indicated  6/2/2024 1055 by Teresa Foster RN  Outcome: Progressing  6/2/2024 0442 by Ann Valadez RN  Outcome: Progressing     Problem: Nutrition Deficit:  Goal: Optimize nutritional status  6/2/2024 1055 by Teresa Foster RN  Outcome: Progressing  6/2/2024 0442 by Ann Valadez RN  Outcome: Progressing     Problem: Chronic Conditions and Co-morbidities  Goal: Patient's chronic conditions and co-morbidity symptoms are monitored and maintained or improved  6/2/2024 1055 by Teresa Foster RN  Outcome: Progressing  6/2/2024 0442 by Ann Valadez RN  Outcome: Progressing     Problem: ABCDS Injury Assessment  Goal: Absence of physical injury  6/2/2024 1055 by Teresa Foster RN  Outcome: Progressing  6/2/2024 0442 by Ann Valadez RN  Outcome: Progressing

## 2024-06-03 VITALS
BODY MASS INDEX: 21.96 KG/M2 | DIASTOLIC BLOOD PRESSURE: 52 MMHG | OXYGEN SATURATION: 89 % | TEMPERATURE: 98.5 F | RESPIRATION RATE: 20 BRPM | WEIGHT: 139.9 LBS | SYSTOLIC BLOOD PRESSURE: 103 MMHG | HEART RATE: 86 BPM | HEIGHT: 67 IN

## 2024-06-03 PROBLEM — J18.9 PNEUMONIA: Status: ACTIVE | Noted: 2024-06-03

## 2024-06-03 PROBLEM — N39.0 UTI (URINARY TRACT INFECTION): Status: RESOLVED | Noted: 2024-05-20 | Resolved: 2024-06-03

## 2024-06-03 LAB
GLUCOSE BLD-MCNC: 186 MG/DL (ref 70–99)
GLUCOSE BLD-MCNC: 234 MG/DL (ref 70–99)
PERFORMED ON: ABNORMAL
PERFORMED ON: ABNORMAL

## 2024-06-03 PROCEDURE — 6370000000 HC RX 637 (ALT 250 FOR IP): Performed by: INTERNAL MEDICINE

## 2024-06-03 PROCEDURE — 6360000002 HC RX W HCPCS: Performed by: STUDENT IN AN ORGANIZED HEALTH CARE EDUCATION/TRAINING PROGRAM

## 2024-06-03 PROCEDURE — 6370000000 HC RX 637 (ALT 250 FOR IP): Performed by: STUDENT IN AN ORGANIZED HEALTH CARE EDUCATION/TRAINING PROGRAM

## 2024-06-03 PROCEDURE — 97530 THERAPEUTIC ACTIVITIES: CPT

## 2024-06-03 PROCEDURE — 97535 SELF CARE MNGMENT TRAINING: CPT

## 2024-06-03 PROCEDURE — C9113 INJ PANTOPRAZOLE SODIUM, VIA: HCPCS | Performed by: STUDENT IN AN ORGANIZED HEALTH CARE EDUCATION/TRAINING PROGRAM

## 2024-06-03 PROCEDURE — 6370000000 HC RX 637 (ALT 250 FOR IP): Performed by: NURSE PRACTITIONER

## 2024-06-03 PROCEDURE — 2580000003 HC RX 258: Performed by: STUDENT IN AN ORGANIZED HEALTH CARE EDUCATION/TRAINING PROGRAM

## 2024-06-03 PROCEDURE — 97110 THERAPEUTIC EXERCISES: CPT

## 2024-06-03 PROCEDURE — 6370000000 HC RX 637 (ALT 250 FOR IP): Performed by: HOSPITALIST

## 2024-06-03 RX ORDER — HYDROCODONE BITARTRATE AND ACETAMINOPHEN 5; 325 MG/1; MG/1
1 TABLET ORAL EVERY 8 HOURS PRN
Qty: 8 TABLET | Refills: 0 | Status: SHIPPED | OUTPATIENT
Start: 2024-06-03 | End: 2024-06-06

## 2024-06-03 RX ORDER — LORAZEPAM 0.5 MG/1
0.5 TABLET ORAL DAILY PRN
Qty: 7 TABLET | Refills: 0 | Status: SHIPPED | OUTPATIENT
Start: 2024-06-03 | End: 2024-06-10

## 2024-06-03 RX ADMIN — INSULIN LISPRO 1 UNITS: 100 INJECTION, SOLUTION INTRAVENOUS; SUBCUTANEOUS at 12:32

## 2024-06-03 RX ADMIN — PANTOPRAZOLE SODIUM 40 MG: 40 INJECTION, POWDER, FOR SOLUTION INTRAVENOUS at 09:44

## 2024-06-03 RX ADMIN — ASPIRIN 81 MG 81 MG: 81 TABLET ORAL at 09:48

## 2024-06-03 RX ADMIN — METOPROLOL SUCCINATE 100 MG: 50 TABLET, EXTENDED RELEASE ORAL at 09:46

## 2024-06-03 RX ADMIN — HYDROCODONE BITARTRATE AND ACETAMINOPHEN 1 TABLET: 5; 325 TABLET ORAL at 09:46

## 2024-06-03 RX ADMIN — ENOXAPARIN SODIUM 40 MG: 100 INJECTION SUBCUTANEOUS at 09:45

## 2024-06-03 RX ADMIN — SACUBITRIL AND VALSARTAN 1 TABLET: 24; 26 TABLET, FILM COATED ORAL at 09:46

## 2024-06-03 RX ADMIN — OXYBUTYNIN CHLORIDE 15 MG: 5 TABLET, EXTENDED RELEASE ORAL at 09:45

## 2024-06-03 RX ADMIN — CLOTRIMAZOLE 10 MG: 10 LOZENGE ORAL at 06:29

## 2024-06-03 RX ADMIN — SODIUM CHLORIDE, PRESERVATIVE FREE 10 ML: 5 INJECTION INTRAVENOUS at 09:49

## 2024-06-03 RX ADMIN — CLOTRIMAZOLE 10 MG: 10 LOZENGE ORAL at 09:46

## 2024-06-03 RX ADMIN — ATORVASTATIN CALCIUM 40 MG: 40 TABLET, FILM COATED ORAL at 09:46

## 2024-06-03 RX ADMIN — FLUTICASONE PROPIONATE 2 SPRAY: 50 SPRAY, METERED NASAL at 09:51

## 2024-06-03 ASSESSMENT — PAIN DESCRIPTION - ORIENTATION
ORIENTATION: RIGHT
ORIENTATION: RIGHT

## 2024-06-03 ASSESSMENT — PAIN DESCRIPTION - LOCATION
LOCATION: HIP
LOCATION: HIP

## 2024-06-03 ASSESSMENT — PAIN DESCRIPTION - ONSET: ONSET: ON-GOING

## 2024-06-03 ASSESSMENT — PAIN DESCRIPTION - DESCRIPTORS: DESCRIPTORS: THROBBING;ACHING;DISCOMFORT

## 2024-06-03 ASSESSMENT — PAIN DESCRIPTION - FREQUENCY: FREQUENCY: CONTINUOUS

## 2024-06-03 ASSESSMENT — PAIN DESCRIPTION - PAIN TYPE: TYPE: ACUTE PAIN

## 2024-06-03 ASSESSMENT — PAIN SCALES - GENERAL
PAINLEVEL_OUTOF10: 8
PAINLEVEL_OUTOF10: 10

## 2024-06-03 NOTE — PLAN OF CARE
Problem: Safety - Adult  Goal: Free from fall injury  6/3/2024 1149 by Stacie Aponte RN  Outcome: Adequate for Discharge  6/3/2024 1043 by Stacie Aponte RN  Outcome: Progressing  6/2/2024 2245 by Pancho Martinez RN  Outcome: Progressing     Problem: Discharge Planning  Goal: Discharge to home or other facility with appropriate resources  6/3/2024 1149 by Stacie Aponte RN  Outcome: Adequate for Discharge  6/3/2024 1043 by Stacie Aponte RN  Outcome: Progressing  6/2/2024 2245 by Pancho Martinez RN  Outcome: Progressing     Problem: Pain  Goal: Verbalizes/displays adequate comfort level or baseline comfort level  6/3/2024 1149 by Stacie Aponte RN  Outcome: Adequate for Discharge  6/3/2024 1043 by Stacie Aponte RN  Outcome: Progressing     Problem: Skin/Tissue Integrity  Goal: Absence of new skin breakdown  Description: 1.  Monitor for areas of redness and/or skin breakdown  2.  Assess vascular access sites hourly  3.  Every 4-6 hours minimum:  Change oxygen saturation probe site  4.  Every 4-6 hours:  If on nasal continuous positive airway pressure, respiratory therapy assess nares and determine need for appliance change or resting period.  6/3/2024 1149 by Stacie Aponte RN  Outcome: Adequate for Discharge  6/3/2024 1043 by Stacie Aponte RN  Outcome: Progressing     Problem: Confusion  Goal: Confusion, delirium, dementia, or psychosis is improved or at baseline  Description: INTERVENTIONS:  1. Assess for possible contributors to thought disturbance, including medications, impaired vision or hearing, underlying metabolic abnormalities, dehydration, psychiatric diagnoses, and notify attending LIP  2. [place] high risk fall precautions, as indicated  3. Provide frequent short contacts to provide reality reorientation, refocusing and direction  4. Decrease environmental stimuli, including noise as appropriate  5. Monitor and intervene to maintain adequate nutrition, hydration, elimination,  sleep and activity  6. If unable to ensure safety without constant attention obtain sitter and review sitter guidelines with assigned personnel  7. Initiate Psychosocial CNS and Spiritual Care consult, as indicated  6/3/2024 1149 by Stacie Aponte RN  Outcome: Adequate for Discharge  6/3/2024 1043 by Stacie Aponte RN  Outcome: Progressing     Problem: Nutrition Deficit:  Goal: Optimize nutritional status  6/3/2024 1149 by Stacie Aponte RN  Outcome: Adequate for Discharge  6/3/2024 1043 by Stacie Aponte RN  Outcome: Progressing     Problem: Chronic Conditions and Co-morbidities  Goal: Patient's chronic conditions and co-morbidity symptoms are monitored and maintained or improved  6/3/2024 1149 by Stacie Aponte RN  Outcome: Adequate for Discharge  6/3/2024 1043 by Stacie Aponte RN  Outcome: Progressing     Problem: ABCDS Injury Assessment  Goal: Absence of physical injury  6/3/2024 1149 by Stacie Aponte RN  Outcome: Adequate for Discharge  6/3/2024 1043 by Stacie Aponte RN  Outcome: Progressing     Problem: Safety - Adult  Goal: Free from fall injury  6/3/2024 1150 by Stacie Aponte RN  Outcome: Adequate for Discharge  6/3/2024 1149 by Stacie Aponte RN  Outcome: Adequate for Discharge  6/3/2024 1043 by Stacie Aponte RN  Outcome: Progressing  6/2/2024 2245 by Pancho Martinez RN  Outcome: Progressing     Problem: Discharge Planning  Goal: Discharge to home or other facility with appropriate resources  6/3/2024 1150 by Stacie Aponte RN  Outcome: Adequate for Discharge  6/3/2024 1149 by Stacie Aponte RN  Outcome: Adequate for Discharge  6/3/2024 1043 by Stacie Aponte RN  Outcome: Progressing  6/2/2024 2245 by Pancho Martinez RN  Outcome: Progressing     Problem: Pain  Goal: Verbalizes/displays adequate comfort level or baseline comfort level  6/3/2024 1150 by Stacie Aponte RN  Outcome: Adequate for Discharge  6/3/2024 1149 by Stacie Aponte RN  Outcome: Adequate for  Discharge  6/3/2024 1043 by Stacie Aponte RN  Outcome: Progressing     Problem: Chronic Conditions and Co-morbidities  Goal: Patient's chronic conditions and co-morbidity symptoms are monitored and maintained or improved  6/3/2024 1150 by Stacie Aponte RN  Outcome: Adequate for Discharge  6/3/2024 1149 by Stacie Aponte RN  Outcome: Adequate for Discharge  6/3/2024 1043 by Stacie Aponte RN  Outcome: Progressing     Problem: ABCDS Injury Assessment  Goal: Absence of physical injury  6/3/2024 1150 by Stacie Aponte RN  Outcome: Adequate for Discharge  6/3/2024 1149 by Stacie Aponte RN  Outcome: Adequate for Discharge  6/3/2024 1043 by Stacie Aponte RN  Outcome: Progressing

## 2024-06-03 NOTE — PLAN OF CARE
Problem: Safety - Adult  Goal: Free from fall injury  6/2/2024 2245 by Pancho Martinez RN  Outcome: Progressing  6/2/2024 1055 by Teresa Foster RN  Outcome: Progressing     Problem: Discharge Planning  Goal: Discharge to home or other facility with appropriate resources  6/2/2024 2245 by Pancho Martinez RN  Outcome: Progressing  6/2/2024 1055 by Teresa Foster RN  Outcome: Progressing     Problem: Pain  Goal: Verbalizes/displays adequate comfort level or baseline comfort level  6/2/2024 1055 by Teresa Foster RN  Outcome: Progressing     Problem: Skin/Tissue Integrity  Goal: Absence of new skin breakdown  Description: 1.  Monitor for areas of redness and/or skin breakdown  2.  Assess vascular access sites hourly  3.  Every 4-6 hours minimum:  Change oxygen saturation probe site  4.  Every 4-6 hours:  If on nasal continuous positive airway pressure, respiratory therapy assess nares and determine need for appliance change or resting period.  6/2/2024 1055 by Teresa Foster RN  Outcome: Progressing     Problem: Confusion  Goal: Confusion, delirium, dementia, or psychosis is improved or at baseline  Description: INTERVENTIONS:  1. Assess for possible contributors to thought disturbance, including medications, impaired vision or hearing, underlying metabolic abnormalities, dehydration, psychiatric diagnoses, and notify attending LIP  2. [place] high risk fall precautions, as indicated  3. Provide frequent short contacts to provide reality reorientation, refocusing and direction  4. Decrease environmental stimuli, including noise as appropriate  5. Monitor and intervene to maintain adequate nutrition, hydration, elimination, sleep and activity  6. If unable to ensure safety without constant attention obtain sitter and review sitter guidelines with assigned personnel  7. Initiate Psychosocial CNS and Spiritual Care consult, as indicated  6/2/2024 1055 by Teresa Foster RN  Outcome: Progressing

## 2024-06-03 NOTE — PROGRESS NOTES
Physical Therapy  Facility/Department: [facility] C5 - MED SURG/ORTHO  Daily Treatment Note  NAME: Shelley Maurer  : 1945  MRN: [id number]    Date of Service: 6/3/2024    Discharge Recommendations:  Subacute/Skilled Nursing Facility   PT Equipment Recommendations  Equipment Needed: No  Other: Defer    Patient Diagnosis(es): The primary encounter diagnosis was Altered mental status, unspecified altered mental status type. Diagnoses of Generalized abdominal pain, Wound of buttock, unspecified laterality, initial encounter, Hypokalemia, Hypocalcemia, Prolonged Q-T interval on ECG, Acute cystitis with hematuria, Hypomagnesemia, Slurred speech, and Congestive heart failure, unspecified HF chronicity, unspecified heart failure type (HCC) were also pertinent to this visit.    Assessment   Assessment: Pt cont to need A of 2 for functional mobility and transfers.  Today pt utilized the rw for tranfers and required mod A Of 2, using the commode while up and SPT to chair.  Pt reocmmended for con't skilled PT and SNF at D/C.  Activity Tolerance: Patient tolerated treatment well;Patient limited by endurance;Patient limited by fatigue  Equipment Needed: No     Plan    Physical Therapy Plan  General Plan: 3-5 times per week  Current Treatment Recommendations: Strengthening;ROM;Balance training;Gait training;Stair training;Functional mobility training;Home exercise program;Therapeutic activities;Safety education & training;Transfer training;Patient/Caregiver education & training;Endurance training     Restrictions  Restrictions/Precautions  Restrictions/Precautions: Fall Risk  Position Activity Restriction  Other position/activity restrictions: up with assistance, IV, O2     Subjective    Subjective  Subjective: Agreeable for therapy  Session  Pain: Reports no pain at rest.  Orientation  Overall Orientation Status: Within Functional Limits  Orientation Level: Oriented to time;Oriented to place;Oriented to person;Oriented  X4;Oriented to situation  Cognition  Overall Cognitive Status: Exceptions  Arousal/Alertness: Delayed responses to stimuli  Following Commands: Follows one step commands with increased time  Attention Span: Attends with cues to redirect  Memory: Decreased recall of recent events  Safety Judgement: Decreased awareness of need for safety;Decreased awareness of need for assistance  Problem Solving: Impaired  Insights: Decreased awareness of deficits  Initiation: Requires cues for some  Sequencing: Requires cues for some     Objective   Vitals  Vitals:    06/03/24 0930   BP: 120/75   Pulse: (!) 112   Resp:    Temp: 97.6 °F (36.4 °C)   SpO2: 96%     Bed Mobility Training  Bed Mobility Training: Yes  Interventions: Safety awareness training;Manual cues;Verbal cues;Visual cues;Tactile cues  Supine to Sit: Moderate assistance  Scooting: Minimum assistance (EOB)  Balance  Sitting: High guard  Sitting - Static: Good (unsupported)  Sitting - Dynamic: Fair (occasional)  Standing: Impaired (rw)  Standing - Static: Poor;Constant support  Standing - Dynamic: Poor;Constant support  Transfer Training  Transfer Training: Yes (rw)  Overall Level of Assistance: Assist X2;Moderate assistance;Additional time;Adaptive equipment  Sit to Stand: Assist X2;Moderate assistance;Additional time;Adaptive equipment  Stand to Sit: Assist X2;Moderate assistance;Additional time;Adaptive equipment  Stand Pivot Transfers: Assist X2;Moderate assistance;Additional time;Adaptive equipment  Toilet Transfer: Assist X2;Moderate assistance;Additional time;Adaptive equipment  Gait  Gait Training:  (2-3 step-to using rw bed>commode)     PT Exercises  Exercise Treatment: BLE AP, LAQ, GS, X 10-15     Safety Devices  Type of Devices: Left in chair;Chair alarm in place;Call light within reach;Nurse notified;Gait belt  Restraints  Restraints Initially in Place: No       Goals  Short Term Goals  Time Frame for Short Term Goals: 5/28/24 - extend goals to 6/4 due to  longer than expected LOS  Short Term Goal 1: pt will complete bed mobility with SBA  -6/03 mod A  Short Term Goal 2: pt will complete transfers with SBA  -6/03 mod A x 2 rw  -  Short Term Goal 3: pt will ambulate 100ft with LRAD and SBA  -6/03 NIRAJ  Short Term Goal 4: pt will navigate up<>down 1 steps with CGA  -6/03 NIRAJ  Short Term Goal 5: pt will particiapte in 10-12 reps of BLE exercises by 5/24/24  -6/03 on-going  Additional Goals?: No  Patient Goals   Patient Goals : \"To go home\"    Education  Patient Education  Education Given To: Patient  Education Provided: Role of Therapy;Plan of Care;Transfer Training;Equipment  Education Method: Verbal  Barriers to Learning: Hearing  Education Outcome: Verbalized understanding;Continued education needed    AM-PAC - Mobility    AM-PAC Basic Mobility - Inpatient   How much help is needed turning from your back to your side while in a flat bed without using bedrails?: A Lot  How much help is needed moving from lying on your back to sitting on the side of a flat bed without using bedrails?: A Lot  How much help is needed moving to and from a bed to a chair?: A Lot  How much help is needed standing up from a chair using your arms?: A Lot  How much help is needed walking in hospital room?: Total  How much help is needed climbing 3-5 steps with a railing?: Total  AM-PAC Inpatient Mobility Raw Score : 10  AM-PAC Inpatient T-Scale Score : 32.29  Mobility Inpatient CMS 0-100% Score: 76.75  Mobility Inpatient CMS G-Code Modifier : CL         Therapy Time   Individual Concurrent Group Co-treatment   Time In       0810   Time Out       0855   Minutes       45   Timed Code Treatment Minutes: 45 Minutes       Samy Groves

## 2024-06-03 NOTE — PROGRESS NOTES
Report called into HANNAH Hobson at [facility]. All questions and concerns were addressed. Name and number left for call back if questions arise.

## 2024-06-03 NOTE — PLAN OF CARE
Problem: Safety - Adult  Goal: Free from fall injury  6/3/2024 1043 by Stacie Aponte RN  Outcome: Progressing  6/2/2024 2245 by Pancho Martinez RN  Outcome: Progressing     Problem: Discharge Planning  Goal: Discharge to home or other facility with appropriate resources  6/3/2024 1043 by Stacie Aponte RN  Outcome: Progressing  6/2/2024 2245 by Pancho Martinez RN  Outcome: Progressing     Problem: Pain  Goal: Verbalizes/displays adequate comfort level or baseline comfort level  Outcome: Progressing     Problem: Skin/Tissue Integrity  Goal: Absence of new skin breakdown  Description: 1.  Monitor for areas of redness and/or skin breakdown  2.  Assess vascular access sites hourly  3.  Every 4-6 hours minimum:  Change oxygen saturation probe site  4.  Every 4-6 hours:  If on nasal continuous positive airway pressure, respiratory therapy assess nares and determine need for appliance change or resting period.  Outcome: Progressing     Problem: Confusion  Goal: Confusion, delirium, dementia, or psychosis is improved or at baseline  Description: INTERVENTIONS:  1. Assess for possible contributors to thought disturbance, including medications, impaired vision or hearing, underlying metabolic abnormalities, dehydration, psychiatric diagnoses, and notify attending LIP  2. [place] high risk fall precautions, as indicated  3. Provide frequent short contacts to provide reality reorientation, refocusing and direction  4. Decrease environmental stimuli, including noise as appropriate  5. Monitor and intervene to maintain adequate nutrition, hydration, elimination, sleep and activity  6. If unable to ensure safety without constant attention obtain sitter and review sitter guidelines with assigned personnel  7. Initiate Psychosocial CNS and Spiritual Care consult, as indicated  Outcome: Progressing     Problem: Nutrition Deficit:  Goal: Optimize nutritional status  Outcome: Progressing     Problem: Chronic Conditions and  Co-morbidities  Goal: Patient's chronic conditions and co-morbidity symptoms are monitored and maintained or improved  Outcome: Progressing     Problem: ABCDS Injury Assessment  Goal: Absence of physical injury  Outcome: Progressing

## 2024-06-03 NOTE — CARE COORDINATION
CASE MANAGEMENT DISCHARGE SUMMARY      Discharge to: [facility]    Precertification completed: Yes  Hospital Exemption Notification (HENS) completed: Yes    IMM given: (date) 6/3/24    New Durable Medical Equipment ordered/agency: Deferred    Transportation: Squad     Agency used: [employer]   time:1400   Ambulance form completed: Yes    Confirmed discharge plan with:     Patient: yes     Family:  yes    Name: Dwayne Contact number: [phone]     Facility/Agency, name: [facility]     Phone number for report to facility: [phone]- [place]     RN, name: Sarmad    Note: Discharging nurse to complete TERESA, reconcile AVS, and place final copy with patient's discharge packet. RN to ensure that written prescriptions for  Level II medications are sent with patient to the facility as per protocol.      Desire Wilson RN

## 2024-06-03 NOTE — DISCHARGE SUMMARY
Hospital Medicine Discharge Summary    Patient: Shelley Maurer   : 1945     Admit Date: 2024   Discharge Date: 6/3/2024    Disposition:  []Home   []HHC  []SNF  []ECF  []Acute Rehab  []LTAC  []Hospice  Code status:  []Full  []DNR/CCA  []Limited (DNR/CCA with Do Not Intubate)  []DNRCC  Condition at Discharge: Stable  Primary Care Provider: Christina Montague MD    Admitting Provider: Dario Segovia DO  Discharge Provider: Sanket Chavez MD     Discharge Diagnoses:      Active Hospital Problems    Diagnosis     Pneumonia [J18.9]        Presenting Admission History:      79-year-old  female with past medical history of diabetes, hyperlipidemia, hypertension who was admitted for mental status and found to have acute hypoxic respiratory failure due to pneumonia.       Assessment/Plan:      #.  Acute hypoxic respiratory failure:  Multifactorial, including Pneumonia and CHF.  Resolved     #.  Right lower lobe pneumonia  Improving  Completed antibiotics.      #.  Acute on chronic Systolic CHF: LVEF 40-45%. Completed IV diuresis. Cardiology was consulted. Cannot rule out ischemic etiology; plan for outpatient testing once stabilized. Continue Bisoprolol with addition of Entresto.      #.  Diarrhea  Suspect medication induced  Doubt C. Difficile     #.  Diabetes mellitus, type with hyperglycemia  Continue current insulin regimen     #.  Altered mental status  Suspect metabolic encephalopathy  Improving     #.  Other medical problem include elevated troponin due to demand ischemia, hyperlipidemia    Physical Exam Performed:      BP (!) 103/52   Pulse 86   Temp 98.5 °F (36.9 °C) (Oral)   Resp 20   Ht 1.702 m (5' 7\")   Wt 63.5 kg (139 lb 14.4 oz)   SpO2 (!) 89%   BMI 21.91 kg/m²     ***  General appearance:  No apparent distress, appears stated age and cooperative.  Respiratory:  Normal respiratory effort.   Cardiovascular:  Regular rate and rhythm.  Abdomen:  Soft, non-tender,  Reason for Exam: diffuse abd pain-woke up with vomiting today Relevant Medical/Surgical History: mellisa-hyst FINDINGS: Lower Chest: There is dependent atelectasis at the lung bases without pleural effusion.  Coronary artery calcifications are present. Organs: The liver is normal in size with smooth contours and homogeneous attenuation. There is no biliary ductal dilatation and the gallbladder is surgically absent.  The spleen, adrenal glands, and pancreas are normal. The kidneys are normal in size with symmetric enhancement. There is no hydronephrosis, contour deforming mass, or perinephric fat stranding.  There is a 1.5 cm hypodense structure of the left upper pole which is stable in size compared to the noncontrast CT performed 2018. GI/Bowel: There is a small hiatal hernia.  The stomach is under distended. The small bowel is normal in caliber without wall thickening or inflammation. The large bowel is collapsed limiting evaluation for wall thickening.  There is sigmoid diverticulosis without associated acute inflammatory change. There is a rounded density within the sigmoid colon most likely compatible with ingested material versus a large hyperdense diverticulum.  The appendix is not visualized. Pelvis: The bladder is distended with mild pericystic inflammation.  There is gas within the bladder.  The uterus is surgically absent.  There are bilateral adnexal cysts which are stable compared to prior exam 2018 and there is a 1.3 cm dense calcification of the right adnexa which is stable compared to 2018. Peritoneum/Retroperitoneum: No free fluid or free air. The abdominal aorta is patent and normal in caliber with moderate calcifications.  The splenic artery is heavily calcified..  No lymphadenopathy is present. Bones/Soft Tissues: No acute osseous abnormality.     1. Distended bladder with mild pericystic inflammation and gas within the bladder.  Gas may be related to infection or could be iatrogenic if there has

## 2024-06-03 NOTE — PROGRESS NOTES
Occupational Therapy  Facility/Department: [facility] C5 - MED SURG/ORTHO  Daily Treatment Note  NAME: Shelley Maurer  : 1945  MRN: [id number]  Date of Service: 6/3/2024    Discharge Recommendations:  Subacute/Skilled Nursing Facility  OT Equipment Recommendations  Equipment Needed: No  Other: defer    AM-PAC score  AM-PAC Inpatient Daily Activity Raw Score: 13 (24)  AM-PAC Inpatient ADL T-Scale Score : 32.03 (24)  ADL Inpatient CMS 0-100% Score: 63.03 (24)  ADL Inpatient CMS G-Code Modifier : CL (24)    Therapy discharge recommendations take into account each patient's current medical complexities and are subject to input/oversight from the patient's healthcare team.   Barriers to Home Discharge:   [] Steps to access home entry or bed/bath:   [x] Unable to transfer, ambulate, or propel wheelchair household distances without assist   [x] Limited available assist at home upon discharge    [x] Patient or family requests d/c to post-acute facility    [x] Poor cognition/safety awareness for d/c to home alone    []Unable to maintain ordered weight bearing status    [] Patient with salient signs of long-standing immobility   [x] Patient is at risk for falls   [x] Other: Decreased safety and independence w/ ADLs.     If pt is unable to be seen after this session, please let this note serve as discharge summary.  Please see case management note for discharge disposition.  Thank you.    Patient Diagnosis(es): The primary encounter diagnosis was Altered mental status, unspecified altered mental status type. Diagnoses of Generalized abdominal pain, Wound of buttock, unspecified laterality, initial encounter, Hypokalemia, Hypocalcemia, Prolonged Q-T interval on ECG, Acute cystitis with hematuria, Hypomagnesemia, Slurred speech, and Congestive heart failure, unspecified HF chronicity, unspecified heart failure type (HCC) were also pertinent to this visit.     Assessment     Assessment: Pt received for OT session resting in bed to address current functional deficits that inhibit independence and safety with ADLs and functional mobility. Pt agreeable to session, reporting little pain in R hip, able to continue with session. During session, pt completed bed mobility w/ modAx1, sit<>stands w/ modAx2 to/from RW, transfers w/ modAx2 (stand step w/ RW  top BSC and bedside chair), toileting w/ totalA (pt starting to have BM in stance, transferred to [facility] where she completed BM. TotalA in stance w/ Hipolito for standing balance for toilet hygiene). Completed shampoo cap w/ totalA and hair brushing w/ totalA d/t increased knots. Pt completed oral hygiene w/ setup and increased time. TotalA purewick placement and LB dressing. Pt will continue to benefit from skilled OT while in acute setting to increase functional activity tolerance, safety/independence w/ ADLs, and increase strength. Pt making good progress towards personalized OT goals. Continued recs for SNF. Continue POC. Co-tx collaboration this date to safely meet goals and will have better occupational performance outcomes with in a co-treatment than 1:1 session.    Activity Tolerance: Patient tolerated treatment well;Patient limited by endurance;Patient limited by fatigue  Discharge Recommendations: Subacute/Skilled Nursing Facility  Equipment Needed: No  Other: defer      Plan   Occupational Therapy Plan  Times Per Week: 3-5x/week  Current Treatment Recommendations: Strengthening;Functional mobility training;Endurance training;Safety education & training;Cognitive reorientation;Self-Care / ADL;Positioning     Restrictions  Restrictions/Precautions  Restrictions/Precautions: Fall Risk  Position Activity Restriction  Other position/activity restrictions: up with assistance, IV, O2    Subjective   Subjective  Subjective: Pt received for OT session resting in bed, agreeable to session.  Pain: No pain reported.  Orientation  Overall Orientation  Status: Within Functional Limits  Orientation Level: Oriented to time;Oriented to place;Oriented to person;Oriented X4;Oriented to situation  Pain: Reports no pain at rest.  Cognition  Overall Cognitive Status: Exceptions  Arousal/Alertness: Delayed responses to stimuli  Following Commands: Follows one step commands with increased time  Attention Span: Attends with cues to redirect  Memory: Decreased recall of recent events  Safety Judgement: Decreased awareness of need for safety;Decreased awareness of need for assistance  Problem Solving: Impaired  Insights: Decreased awareness of deficits  Initiation: Requires cues for some  Sequencing: Requires cues for some      Objective    Vitals  Vitals  Pulse: 95  Heart Rate Source: Monitor  SpO2: 91 %  O2 Device: None (Room air)  BP: (!) 150/73  MAP (Calculated): 99  BP Location: Right upper arm  BP Method: Automatic  Patient Position: Semi fowlers  Bed Mobility Training  Bed Mobility Training: Yes  Interventions: Safety awareness training;Manual cues;Verbal cues;Visual cues;Tactile cues  Supine to Sit: Moderate assistance  Scooting: Minimum assistance (EOB)  Balance  Sitting: High guard  Sitting - Static: Good (unsupported)  Sitting - Dynamic: Fair (occasional)  Standing: Impaired (rw)  Standing - Static: Poor;Constant support  Standing - Dynamic: Poor;Constant support  Transfer Training  Transfer Training: Yes (rw)  Overall Level of Assistance: Assist X2;Moderate assistance;Additional time;Adaptive equipment  Sit to Stand: Assist X2;Moderate assistance;Additional time;Adaptive equipment  Stand to Sit: Assist X2;Moderate assistance;Additional time;Adaptive equipment  Stand Pivot Transfers: Assist X2;Moderate assistance;Additional time;Adaptive equipment  Toilet Transfer: Assist X2;Moderate assistance;Additional time;Adaptive equipment  Gait  Gait Training:  (2-3 step-to using rw bed>commode)     ADL  Feeding: Setup  Grooming: Setup;Stand by assistance;Verbal

## 2024-06-14 ENCOUNTER — TELEPHONE (OUTPATIENT)
Dept: CARDIOLOGY CLINIC | Age: 79
End: 2024-06-14

## 2024-06-17 NOTE — PROGRESS NOTES
Physician Progress Note      PATIENT:               BERONICA VARMA  CSN #:                  [id number]  :                       1945  ADMIT DATE:       2024 2:51 PM  DISCH DATE:        6/3/2024 1:45 PM  RESPONDING  PROVIDER #:        Roderick Noel MD          QUERY TEXT:    Pt admitted with UTI.  Pt noted to have pneumonia in  Internal Medicine PN,    . If possible, please document in the progress notes and discharge   summary if you are evaluating and/or treating any of the following:      The medical record reflects the following:  Risk Factors: DM, CHF, ARF    Clinical Indicators: Internal Medicine PN  Right lower lobe pneumonia   -improving  XR CHEST (2 VW)  There is an infiltrate noted in the the right lung base,   in keeping with pneumonia. pt had pna, started on abx but worsened and was   increased to Zosyn, has chronic conditions DM and CHF as risk factors    Treatment: IV Zosyn, IV Rocephin    Thank you  GREY Gomez CDS  Options provided:  -- Gram negative pneumonia  -- Other - I will add my own diagnosis  -- Disagree - Not applicable / Not valid  -- Disagree - Clinically unable to determine / Unknown  -- Refer to Clinical Documentation Reviewer    PROVIDER RESPONSE TEXT:    This patient has gram negative pneumonia.    Query created by: Mikael Bermeo on 2024 5:57 AM      Electronically signed by:  Roderick Noel MD 2024 8:53 AM

## 2024-06-20 ENCOUNTER — OFFICE VISIT (OUTPATIENT)
Dept: CARDIOLOGY CLINIC | Age: 79
End: 2024-06-20
Payer: MEDICARE

## 2024-06-20 VITALS
DIASTOLIC BLOOD PRESSURE: 62 MMHG | OXYGEN SATURATION: 97 % | SYSTOLIC BLOOD PRESSURE: 108 MMHG | HEIGHT: 67 IN | BODY MASS INDEX: 21.91 KG/M2 | HEART RATE: 77 BPM

## 2024-06-20 DIAGNOSIS — I10 PRIMARY HYPERTENSION: ICD-10-CM

## 2024-06-20 DIAGNOSIS — M35.3 POLYMYALGIA RHEUMATICA (HCC): ICD-10-CM

## 2024-06-20 DIAGNOSIS — E78.2 MIXED HYPERLIPIDEMIA: ICD-10-CM

## 2024-06-20 DIAGNOSIS — I42.0 DILATED CARDIOMYOPATHY (HCC): Primary | ICD-10-CM

## 2024-06-20 DIAGNOSIS — N18.32 STAGE 3B CHRONIC KIDNEY DISEASE (HCC): Chronic | ICD-10-CM

## 2024-06-20 PROCEDURE — 3078F DIAST BP <80 MM HG: CPT | Performed by: NURSE PRACTITIONER

## 2024-06-20 PROCEDURE — 3074F SYST BP LT 130 MM HG: CPT | Performed by: NURSE PRACTITIONER

## 2024-06-20 PROCEDURE — 1123F ACP DISCUSS/DSCN MKR DOCD: CPT | Performed by: NURSE PRACTITIONER

## 2024-06-20 PROCEDURE — 99214 OFFICE O/P EST MOD 30 MIN: CPT | Performed by: NURSE PRACTITIONER

## 2024-06-20 RX ORDER — ACETAMINOPHEN 325 MG/1
650 TABLET ORAL EVERY 6 HOURS PRN
COMMUNITY

## 2024-06-20 RX ORDER — DULOXETIN HYDROCHLORIDE 20 MG/1
20 CAPSULE, DELAYED RELEASE ORAL DAILY
COMMUNITY

## 2024-06-20 RX ORDER — MENTHOL 40 MG/ML
GEL TOPICAL
COMMUNITY

## 2024-06-20 RX ORDER — ONDANSETRON 4 MG/1
4 TABLET, FILM COATED ORAL EVERY 4 HOURS PRN
COMMUNITY

## 2024-06-20 RX ORDER — HYDROCODONE BITARTRATE AND ACETAMINOPHEN 5; 325 MG/1; MG/1
1 TABLET ORAL EVERY 6 HOURS PRN
COMMUNITY

## 2024-06-20 RX ORDER — MIRTAZAPINE 15 MG/1
7.5 TABLET, FILM COATED ORAL NIGHTLY
COMMUNITY

## 2024-06-20 RX ORDER — LANOLIN ALCOHOL/MO/W.PET/CERES
5 CREAM (GRAM) TOPICAL DAILY
COMMUNITY

## 2024-06-20 NOTE — PROGRESS NOTES
[facility]   Cardiac Evaluation    Primary Care Doctor:  Christina Montague MD    Chief Complaint   Patient presents with    Follow-Up from Hospital    Hypertension    Hyperlipidemia        Assessment:    1. Dilated cardiomyopathy (HCC)    2. Stage 3b chronic kidney disease (HCC)    3. Primary hypertension    4. Mixed hyperlipidemia    5. Polymyalgia rheumatica (HCC)        Plan:   No change in heart medicines  Increase fluid intake to 2 L per day (at least)  Blood work - BMP, BNP, CBC - fax results to [phone]  Follow up with me in 6 weeks and Dr. Melo in 3 months     Vitals:    06/20/24 1313   BP: 108/62   Pulse: 77   SpO2: 97%   Weight: Comment: pt physically unable   Height: 1.702 m (5' 7\")       Primary Cardiologist: Dr. Andrea Melo     History of Present Illness:   I had the pleasure of seeing Shelley Maurer (79 y.o.) in follow up for recent hospitalization.  She presented with altered mental status.  She was treated for acute hypoxic respiratory failure, right lower lobe pneumonia, UTI.  Cardiology was consulted for elevated troponin and concern for decompensated CHF.  Echocardiogram showed EF 40-45% with mild anterior septal hypokinesis.  BNP was elevated.  Describes no chest pain recent or previously.  She has a past medical history of hypertension, hyperlipidemia, T2DM.    Her Lasix was discontinued as it was felt that she was euvolemic versus dehydrated.  She was continued on metoprolol.  Lisinopril was transitioned to Entresto.  Further evaluation including ischemia workup was recommended as outpatient.    She arrives here on a stretcher from [facility].  She complains of thirst and dehydration.  She is not provided with much fluid at facility per staff.    She is working with therapy and doing well with walker.  This is limited due to back pain.   She has lightheadedness/ dizziness when goes to get up 2-3 times per week.   Denies any shortness of breath.  Has some chest discomfort on

## 2024-06-20 NOTE — PATIENT INSTRUCTIONS
No change in heart medicines  Increase fluid intake to 2 L per day (at least)  Blood work - BMP, BNP, CBC - fax results to [phone]  Follow up with me in 6 weeks and Dr. Melo in 3 months

## 2024-07-03 NOTE — PROGRESS NOTES
Physician Progress Note      PATIENT:               BERONICA VARMA  CSN #:                  [id number]  :                       1945  ADMIT DATE:       2024 2:51 PM  DISCH DATE:        6/3/2024 1:45 PM  RESPONDING  PROVIDER #:        Roderick Noel MD          QUERY TEXT:    Pt admitted with UTI. Pt noted to have Acute Respiratory Failure 2/2 PNA in IM   PN . If possible, please document in progress notes and discharge summary   the present on admission status of Pneumonia:    The medical record reflects the following:  Risk Factors: Age, Sepsis, Acute Respiratory Failure  Clinical Indicators: H&P-CXR showed possible PNA vs atelectasis,   Gastroenterology Consult -There was also question of possible pneumonia   versus atelectasis on chest x-ray.  IM PN -Acute Respiratory Failure 2/2 PNA - w/ hypoxia. Presence of   clinical respiratory distress w/ dyspnea/SOB, CXR-Streaky airspace opacities   of the left lung base may represent atelectasis or developing pneumonia.  Procalcitonin-0.12-, 0.58 -  Treatment: IV Zosyn, IV Rocephin      Thank you,  Jayda Garcia, [facility] CDS  Options provided:  -- Yes, Pneumonia was present at the time of the order to admit to the   hospital  -- No, Pneumonia was not present on admission and developed during the   inpatient stay  -- Other - I will add my own diagnosis  -- Disagree - Not applicable / Not valid  -- Disagree - Clinically unable to determine / Unknown  -- Refer to Clinical Documentation Reviewer    PROVIDER RESPONSE TEXT:    Yes, Pneumonia was present at the time of the order to admit to the hospital.    Query created by: Jayda Garcia on 2024 2:48 AM      Electronically signed by:  Roderick Noel MD 7/3/2024 2:49 PM

## 2024-09-03 LAB
ALBUMIN: 3.6 G/DL
ALP BLD-CCNC: 78 U/L
ALT SERPL-CCNC: 9 U/L
ANION GAP SERPL CALCULATED.3IONS-SCNC: 1.2 MMOL/L
AST SERPL-CCNC: 12 U/L
BASOPHILS ABSOLUTE: ABNORMAL
BASOPHILS RELATIVE PERCENT: 0.7 %
BILIRUB SERPL-MCNC: 0.2 MG/DL (ref 0.1–1.4)
BUN BLDV-MCNC: 17 MG/DL
CALCIUM SERPL-MCNC: 8.5 MG/DL
CHLORIDE BLD-SCNC: 108 MMOL/L
CHOLESTEROL, TOTAL: 120 MG/DL
CHOLESTEROL/HDL RATIO: 1.3
CO2: 24 MMOL/L
CREAT SERPL-MCNC: 1.3 MG/DL
EOSINOPHILS ABSOLUTE: 0.3 /ΜL
EOSINOPHILS RELATIVE PERCENT: 5.5 %
GFR, ESTIMATED: 39
GLUCOSE BLD-MCNC: 181 MG/DL
HCT VFR BLD CALC: 31.4 % (ref 36–46)
HDLC SERPL-MCNC: 36 MG/DL (ref 35–70)
HEMOGLOBIN: 9.9 G/DL (ref 12–16)
LDL CHOLESTEROL: 4.6
LYMPHOCYTES ABSOLUTE: 1.2 /ΜL
LYMPHOCYTES RELATIVE PERCENT: 21.4 %
MAGNESIUM: 1.3 MG/DL
MCH RBC QN AUTO: 29.4 PG
MCHC RBC AUTO-ENTMCNC: 31.5 G/DL
MCV RBC AUTO: 93.5 FL
MONOCYTES ABSOLUTE: 0.3 /ΜL
MONOCYTES RELATIVE PERCENT: 5.9 %
NEUTROPHILS ABSOLUTE: 3.7 /ΜL
NEUTROPHILS RELATIVE PERCENT: 66.5 %
NONHDLC SERPL-MCNC: NORMAL MG/DL
PLATELET # BLD: 218 K/ΜL
PMV BLD AUTO: 8.2 FL
POTASSIUM SERPL-SCNC: 4.7 MMOL/L
RBC # BLD: 3.36 10^6/ΜL
SODIUM BLD-SCNC: 143 MMOL/L
TOTAL PROTEIN: 6.5 G/DL (ref 6.4–8.2)
TRIGL SERPL-MCNC: 190 MG/DL
VLDLC SERPL CALC-MCNC: 38 MG/DL
WBC # BLD: 5.6 10^3/ML

## 2024-09-11 ENCOUNTER — OFFICE VISIT (OUTPATIENT)
Dept: CARDIOLOGY CLINIC | Age: 79
End: 2024-09-11
Payer: MEDICARE

## 2024-09-11 ENCOUNTER — TELEPHONE (OUTPATIENT)
Dept: CARDIOLOGY CLINIC | Age: 79
End: 2024-09-11

## 2024-09-11 VITALS
DIASTOLIC BLOOD PRESSURE: 54 MMHG | WEIGHT: 138 LBS | HEIGHT: 67 IN | OXYGEN SATURATION: 95 % | BODY MASS INDEX: 21.66 KG/M2 | HEART RATE: 71 BPM | SYSTOLIC BLOOD PRESSURE: 112 MMHG

## 2024-09-11 DIAGNOSIS — R06.02 SHORTNESS OF BREATH: ICD-10-CM

## 2024-09-11 DIAGNOSIS — I10 PRIMARY HYPERTENSION: ICD-10-CM

## 2024-09-11 DIAGNOSIS — I42.0 DILATED CARDIOMYOPATHY (HCC): Primary | ICD-10-CM

## 2024-09-11 DIAGNOSIS — R94.31 ABNORMAL ELECTROCARDIOGRAPHY: ICD-10-CM

## 2024-09-11 DIAGNOSIS — I42.9 CARDIOMYOPATHY, UNSPECIFIED TYPE (HCC): ICD-10-CM

## 2024-09-11 DIAGNOSIS — Z79.899 MEDICATION MANAGEMENT: Primary | ICD-10-CM

## 2024-09-11 DIAGNOSIS — I50.42 CHRONIC COMBINED SYSTOLIC AND DIASTOLIC CONGESTIVE HEART FAILURE (HCC): ICD-10-CM

## 2024-09-11 DIAGNOSIS — E78.2 MIXED HYPERLIPIDEMIA: ICD-10-CM

## 2024-09-11 DIAGNOSIS — M35.3 POLYMYALGIA RHEUMATICA (HCC): ICD-10-CM

## 2024-09-11 DIAGNOSIS — N18.32 STAGE 3B CHRONIC KIDNEY DISEASE (HCC): ICD-10-CM

## 2024-09-11 PROCEDURE — 1123F ACP DISCUSS/DSCN MKR DOCD: CPT | Performed by: NURSE PRACTITIONER

## 2024-09-11 PROCEDURE — 3078F DIAST BP <80 MM HG: CPT | Performed by: NURSE PRACTITIONER

## 2024-09-11 PROCEDURE — 99214 OFFICE O/P EST MOD 30 MIN: CPT | Performed by: NURSE PRACTITIONER

## 2024-09-11 PROCEDURE — 3074F SYST BP LT 130 MM HG: CPT | Performed by: NURSE PRACTITIONER

## 2024-09-11 RX ORDER — M-VIT,TX,IRON,MINS/CALC/FOLIC 27MG-0.4MG
1 TABLET ORAL DAILY
COMMUNITY

## 2024-09-11 RX ORDER — TRAMADOL HYDROCHLORIDE 50 MG/1
TABLET ORAL
COMMUNITY
Start: 2024-08-28

## 2024-09-11 RX ORDER — REGADENOSON 0.08 MG/ML
0.4 INJECTION, SOLUTION INTRAVENOUS
OUTPATIENT
Start: 2024-09-11

## 2024-09-11 RX ORDER — RAMELTEON 8 MG/1
TABLET ORAL
COMMUNITY
Start: 2024-09-05

## 2024-09-11 NOTE — TELEPHONE ENCOUNTER
Called [facility] to request most recent labs be faxed to the office and inform them pt needs scheduled for stress test. CS number provided

## 2024-09-13 NOTE — TELEPHONE ENCOUNTER
Called [facility] and spoke to Monica, requested labs be faxed again due to not receiving. Fax number provided

## 2024-09-17 NOTE — TELEPHONE ENCOUNTER
Called [facility] again due to not receiving labs, provided alternate fax number. Still awaiting labs    FYI only

## 2024-09-18 ENCOUNTER — HOSPITAL ENCOUNTER (OUTPATIENT)
Age: 79
Discharge: HOME | End: 2024-09-18

## 2024-09-23 NOTE — TELEPHONE ENCOUNTER
Lab results reviewed and stable excepting magnesium remains critically low at 1.3.  Prior records show she is on magnesium 400 mg twice daily which was started following these blood test results.  Cholesterol panel stable.    Recommend repeat BMP and magnesium level within the next week.  Thank you, Bruna

## 2024-09-24 NOTE — TELEPHONE ENCOUNTER
Attempted to contact ptSYL for pt to call the office.   Lab orders placed   Attempted to reach [facility] nurse, whit  for SYL Anderson for rashad to call the office. need to confirm pt is taking magnesium 400 mg twice daily and relay pt needs f/u labwork. Please obtain fax number to fax lab orders to

## 2024-09-25 NOTE — TELEPHONE ENCOUNTER
Spoke with Nikole at [facility], she is only taking it once a day. Informed to have her take it twice a day as prescribed. She will have patient get blood work repeated within the week and fax to our office.

## 2024-10-03 LAB
BUN BLDV-MCNC: 18 MG/DL
CALCIUM SERPL-MCNC: 8.6 MG/DL
CHLORIDE BLD-SCNC: 106 MMOL/L
CO2: 25 MMOL/L
CREAT SERPL-MCNC: 1.7 MG/DL
EGFR: 29
GLUCOSE BLD-MCNC: 128 MG/DL
MAGNESIUM: 1.6 MG/DL
POTASSIUM SERPL-SCNC: 5.5 MMOL/L
SODIUM BLD-SCNC: 140 MMOL/L

## 2024-10-04 NOTE — TELEPHONE ENCOUNTER
Potassium elevated but magnesium remains low.  Renal function worse.    Lets decrease her Entresto to half tablet twice daily.  Limit potassium rich foods  Increase magnesium to 100 mg tablet to 2 pills twice daily  Repeat BMP and magnesium in 2-3 weeks    Thank you, Bruna

## 2024-10-04 NOTE — TELEPHONE ENCOUNTER
Called [facility] and spoke with Jazlyn, relayed [employer] message. Jazlyn requested letter with instructions be faxed over to them @ [phone]. Letter created and faxed .

## 2024-10-04 NOTE — TELEPHONE ENCOUNTER
Nikole from [facility] states lower dose of Entresto that was ordered is already what pt is taking. Please advise.    Call back phone number is [phone]

## 2024-10-11 ENCOUNTER — HOSPITAL ENCOUNTER (OUTPATIENT)
Age: 79
Discharge: HOME | End: 2024-10-13
Payer: MEDICARE

## 2024-10-11 ENCOUNTER — HOSPITAL ENCOUNTER (OUTPATIENT)
Dept: NUCLEAR MEDICINE | Age: 79
Discharge: HOME | End: 2024-10-11
Payer: MEDICARE

## 2024-10-11 DIAGNOSIS — R06.02 SHORTNESS OF BREATH: ICD-10-CM

## 2024-10-11 DIAGNOSIS — I42.9 CARDIOMYOPATHY, UNSPECIFIED TYPE (HCC): ICD-10-CM

## 2024-10-11 DIAGNOSIS — R94.31 ABNORMAL ELECTROCARDIOGRAPHY: ICD-10-CM

## 2024-10-11 LAB
NUC STRESS EJECTION FRACTION: 71 %
NUC STRESS LV EDV: 63 ML (ref 56–104)
NUC STRESS LV ESV: 18 ML (ref 19–49)
NUC STRESS LV MASS: 107 G
STRESS BASELINE DIAS BP: 69 MMHG
STRESS BASELINE HR: 58 BPM
STRESS BASELINE SYS BP: 142 MMHG
STRESS ESTIMATED WORKLOAD: 1 METS
STRESS PEAK DIAS BP: 71 MMHG
STRESS PEAK SYS BP: 150 MMHG
STRESS PERCENT HR ACHIEVED: 62 %
STRESS POST PEAK HR: 86 BPM
STRESS RATE PRESSURE PRODUCT: ABNORMAL BPM*MMHG
STRESS TARGET HR: 139 BPM

## 2024-10-11 PROCEDURE — 78452 HT MUSCLE IMAGE SPECT MULT: CPT

## 2024-10-11 PROCEDURE — 3430000000 HC RX DIAGNOSTIC RADIOPHARMACEUTICAL: Performed by: NURSE PRACTITIONER

## 2024-10-11 PROCEDURE — 6360000002 HC RX W HCPCS: Performed by: NURSE PRACTITIONER

## 2024-10-11 PROCEDURE — 93017 CV STRESS TEST TRACING ONLY: CPT

## 2024-10-11 PROCEDURE — A9502 TC99M TETROFOSMIN: HCPCS | Performed by: NURSE PRACTITIONER

## 2024-10-11 RX ORDER — REGADENOSON 0.08 MG/ML
0.4 INJECTION, SOLUTION INTRAVENOUS
Status: COMPLETED | OUTPATIENT
Start: 2024-10-11 | End: 2024-10-11

## 2024-10-11 RX ORDER — AMINOPHYLLINE 25 MG/ML
75 INJECTION, SOLUTION INTRAVENOUS ONCE
Status: COMPLETED | OUTPATIENT
Start: 2024-10-11 | End: 2024-10-11

## 2024-10-11 RX ORDER — AMINOPHYLLINE 25 MG/ML
50 INJECTION, SOLUTION INTRAVENOUS ONCE
Status: COMPLETED | OUTPATIENT
Start: 2024-10-11 | End: 2024-10-11

## 2024-10-11 RX ADMIN — AMINOPHYLLINE 75 MG: 25 INJECTION, SOLUTION INTRAVENOUS at 09:20

## 2024-10-11 RX ADMIN — REGADENOSON 0.4 MG: 0.08 INJECTION, SOLUTION INTRAVENOUS at 09:14

## 2024-10-11 RX ADMIN — AMINOPHYLLINE 50 MG: 25 INJECTION, SOLUTION INTRAVENOUS at 09:23

## 2024-10-11 RX ADMIN — TETROFOSMIN 32.6 MILLICURIE: 1.38 INJECTION, POWDER, LYOPHILIZED, FOR SOLUTION INTRAVENOUS at 09:14

## 2024-10-11 RX ADMIN — TETROFOSMIN 10.2 MILLICURIE: 1.38 INJECTION, POWDER, LYOPHILIZED, FOR SOLUTION INTRAVENOUS at 08:17

## 2024-10-11 NOTE — NURSING NOTE
Patient had delayed symptoms with Lexiscan. C/o left arm pain, and nausea. Aminophylline ivp given per protocol. Symptoms resolved.

## 2025-02-19 ENCOUNTER — OFFICE VISIT (OUTPATIENT)
Dept: FAMILY MEDICINE CLINIC | Age: 80
End: 2025-02-19

## 2025-02-19 VITALS
HEART RATE: 74 BPM | BODY MASS INDEX: 22.94 KG/M2 | TEMPERATURE: 96.9 F | RESPIRATION RATE: 16 BRPM | WEIGHT: 151.4 LBS | HEIGHT: 68 IN | SYSTOLIC BLOOD PRESSURE: 116 MMHG | DIASTOLIC BLOOD PRESSURE: 60 MMHG | OXYGEN SATURATION: 97 %

## 2025-02-19 DIAGNOSIS — F51.01 PRIMARY INSOMNIA: ICD-10-CM

## 2025-02-19 DIAGNOSIS — Z91.81 AT HIGH RISK FOR FALLS: ICD-10-CM

## 2025-02-19 DIAGNOSIS — I50.20 HFREF (HEART FAILURE WITH REDUCED EJECTION FRACTION) (HCC): ICD-10-CM

## 2025-02-19 DIAGNOSIS — E11.9 TYPE 2 DIABETES MELLITUS WITHOUT COMPLICATION, WITHOUT LONG-TERM CURRENT USE OF INSULIN (HCC): Primary | ICD-10-CM

## 2025-02-19 LAB
CHP ED QC CHECK: ABNORMAL
GLUCOSE BLD-MCNC: 564 MG/DL
HBA1C MFR BLD: 11.5 %

## 2025-02-19 RX ORDER — QUETIAPINE FUMARATE 25 MG/1
25 TABLET, FILM COATED ORAL 2 TIMES DAILY
Qty: 180 TABLET | Refills: 0 | Status: SHIPPED | OUTPATIENT
Start: 2025-02-19 | End: 2025-05-20

## 2025-02-19 RX ORDER — SACUBITRIL AND VALSARTAN 24; 26 MG/1; MG/1
1 TABLET, FILM COATED ORAL 2 TIMES DAILY
Qty: 180 TABLET | Refills: 0 | Status: SHIPPED | OUTPATIENT
Start: 2025-02-19 | End: 2025-02-19

## 2025-02-19 RX ORDER — SACUBITRIL AND VALSARTAN 24; 26 MG/1; MG/1
1 TABLET, FILM COATED ORAL 2 TIMES DAILY
Qty: 180 TABLET | Refills: 0 | Status: SHIPPED | OUTPATIENT
Start: 2025-02-19 | End: 2025-05-20

## 2025-02-19 RX ORDER — INSULIN GLARGINE 100 [IU]/ML
10 INJECTION, SOLUTION SUBCUTANEOUS NIGHTLY
Qty: 5 ADJUSTABLE DOSE PRE-FILLED PEN SYRINGE | Refills: 0 | Status: SHIPPED | OUTPATIENT
Start: 2025-02-19 | End: 2025-02-19

## 2025-02-19 RX ORDER — INSULIN GLARGINE 100 [IU]/ML
10 INJECTION, SOLUTION SUBCUTANEOUS NIGHTLY
Qty: 5 ADJUSTABLE DOSE PRE-FILLED PEN SYRINGE | Refills: 0 | Status: SHIPPED | OUTPATIENT
Start: 2025-02-19

## 2025-02-19 SDOH — ECONOMIC STABILITY: FOOD INSECURITY: WITHIN THE PAST 12 MONTHS, YOU WORRIED THAT YOUR FOOD WOULD RUN OUT BEFORE YOU GOT MONEY TO BUY MORE.: NEVER TRUE

## 2025-02-19 SDOH — ECONOMIC STABILITY: FOOD INSECURITY: WITHIN THE PAST 12 MONTHS, THE FOOD YOU BOUGHT JUST DIDN'T LAST AND YOU DIDN'T HAVE MONEY TO GET MORE.: NEVER TRUE

## 2025-02-19 ASSESSMENT — ENCOUNTER SYMPTOMS
WHEEZING: 0
SHORTNESS OF BREATH: 0
COUGH: 0
RHINORRHEA: 0
NAUSEA: 0
SINUS PRESSURE: 0
VOMITING: 0
BLOOD IN STOOL: 0
SORE THROAT: 0
ABDOMINAL PAIN: 0
EYE ITCHING: 0
EYE REDNESS: 0
CONSTIPATION: 0
DIARRHEA: 0
BACK PAIN: 1
CHEST TIGHTNESS: 0
COLOR CHANGE: 0

## 2025-02-19 ASSESSMENT — PATIENT HEALTH QUESTIONNAIRE - PHQ9
1. LITTLE INTEREST OR PLEASURE IN DOING THINGS: SEVERAL DAYS
SUM OF ALL RESPONSES TO PHQ QUESTIONS 1-9: 2
SUM OF ALL RESPONSES TO PHQ9 QUESTIONS 1 & 2: 2
2. FEELING DOWN, DEPRESSED OR HOPELESS: SEVERAL DAYS
SUM OF ALL RESPONSES TO PHQ QUESTIONS 1-9: 2

## 2025-02-19 NOTE — ASSESSMENT & PLAN NOTE
Her blood glucose levels have been consistently elevated, with an A1c of 11.5. She is currently on Jardiance 25 mg. She will be started on Lantus 10 units at bedtime. She is advised to monitor her blood glucose levels four times daily: fasting, pre-meal, and at bedtime. A diabetic diet has been recommended. Recent lab results will be requested from [facility]. If her blood glucose levels remain above 300 after starting Lantus, [facility] is instructed to contact the provider. The prescription will be sent to [facility].

## 2025-02-19 NOTE — PROGRESS NOTES
Shelley Maurer (:  1945) is a 79 y.o. female,Established patient, here for evaluation of the following chief complaint(s):  Established New Doctor      ASSESSMENT/PLAN:  Primary insomnia    A prescription for Seroquel will be sent to [facility] to help with her sleep issues.    HFrEF (heart failure with reduced ejection fraction) ([facility])   She has a history of heart failure and was previously on Entresto, which was not continued after her move to [facility]. Entresto will be restarted to manage her heart failure. She is advised to follow up with Dr. Roberto, a cardiologist closer to her location, for further management.    Type 2 diabetes mellitus without complication, without long-term current use of insulin ([facility])   Her blood glucose levels have been consistently elevated, with an A1c of 11.5. She is currently on Jardiance 25 mg. She will be started on Lantus 10 units at bedtime. She is advised to monitor her blood glucose levels four times daily: fasting, pre-meal, and at bedtime. A diabetic diet has been recommended. Recent lab results will be requested from [facility]. If her blood glucose levels remain above 300 after starting Lantus, [facility] is instructed to contact the provider. The prescription will be sent to [facility].  Assessment & Plan  Follow-up  The patient will follow up in 1 month.      No follow-ups on file.    SUBJECTIVE/OBJECTIVE:    History of Present Illness  The patient presents as a new patient for evaluation of diabetes, actinic keratosis, herpes labialis, insomnia, and heart failure. She is accompanied by her daughter-in-law.    She has been experiencing persistent hyperglycemia, with blood glucose levels ranging from 300 to 500 daily for the past three months. She resides at [facility] in assisted living, where a physician increased her Jardiance dosage from 10 mg to 25 mg last Thursday. Despite this adjustment, her blood glucose levels remain elevated. She is scheduled for oral

## 2025-02-19 NOTE — ASSESSMENT & PLAN NOTE
She has a history of heart failure and was previously on Entresto, which was not continued after her move to [facility]. Entresto will be restarted to manage her heart failure. She is advised to follow up with Dr. Roberto, a cardiologist closer to her location, for further management.

## 2025-02-19 NOTE — PATIENT INSTRUCTIONS
Regarding diabetes:  - Continue jaridance 25 mg daily, start lantus 10 units nightly  - check blood glucose levels ACHS and track log to bring to follow up visit (if blood glucose remains greater than 300, call for orders [phone] option 3- ask for Clary Friedman  - diabetic diet    Regarding CHF:  - restart entresto  - follow up with cardiology Dr. Roberto with [facility], make appointment in the next month    Regarding insomnia:  - start seroquel  Follow up with Clary Friedman CNP in 1 month, or sooner if needed

## 2025-02-24 ENCOUNTER — TELEPHONE (OUTPATIENT)
Dept: FAMILY MEDICINE CLINIC | Age: 80
End: 2025-02-24

## 2025-03-07 NOTE — PATIENT INSTRUCTIONS
Thank you for choosing [facility] for your cardiac care.    During your visit today, we reviewed and confirmed your cardiac medications along with  medication prescribed by your other healthcare team members. Please be sure to discuss any  changes to medication with your providers.    Please bring a list of ALL medications (or the bottles) with you to EVERY appointment.  Also include vitamins and over-the-counter medications.    If you need refills for any cardiac medications, please call your pharmacy and they will reach out to us electronically.    Did your provider order testing today? If yes, then you will receive your results in three  possible ways. You can receive a [facility] message, a phone call, or letter in the mail. Please  note, if you are an active [facility] user, some of your testing will be available within 1-2 days.    Finally, please know that it is good for your heart to exercise and follow a healthy, low-fat diet  as advised by your physician and health care providers.    If you are experiencing a medical emergency, please call 911 immediately.    It's easy to register for a [facility] account if you don't already have one. With a [facility]  account you can manage your health record, view test results, schedule appointments and  more.     Dr. Lee's clinical staff can be reached at the following phone number: [phone]    If any cardiac testing is ordered, please contact central scheduling at [phone] to get your test scheduled.

## 2025-03-07 NOTE — PROGRESS NOTES
[facility]     Outpatient Cardiology         Patient Name:  Shelley Maurer  Primary Care Physician: Clary Friedman, APRN - CNP  03/07/25     Assessment & Plan    Assessment / Plan:     ***              Chief Complaint:     No chief complaint on file.      History of Present Illness:       HPI     Shelley Maurer is a 79 y.o. female with PMH of diabetes, hypertension, systolic heart failure, hyperlipidemia, and chronic kidney disease.     Here for for management of heart failure. She was last seen by Bruna Cardenas NP 6/20/24 for management of heart failure. She was admitted 5/2024 with pneumonia and echo showed EF 40-45% and started on medical therapy. She completed a stress test  10/11/24 that showed improved EF, and was felt to be low intermediate risk.               Patient denies any chest pain, shortness of breath, palpitations, presyncope or syncope. No TIA. No claudication. No recent hospitalizations    PMH  Past Medical History:   Diagnosis Date    Meredith esophagus     Bladder prolapse     Depression     Diabetes mellitus (HCC)     Hyperlipidemia     Osteoporosis     Tachycardia        PSH  Past Surgical History:   Procedure Laterality Date    BACK SURGERY      cervical    BLADDER SUSPENSION      X 3    CHOLECYSTECTOMY      COLONOSCOPY  5-3-16    normal    HYSTERECTOMY (CERVIX STATUS UNKNOWN)      NOSE SURGERY      fracture    TOE SURGERY      UPPER GASTROINTESTINAL ENDOSCOPY  2/11/13    UPPER GASTROINTESTINAL ENDOSCOPY  5-3-16    biopsy esophagus + dilatation    WRIST SURGERY          Social HIstory  Social History     Tobacco Use    Smoking status: Former     Current packs/day: 0.50     Types: Cigarettes    Smokeless tobacco: Never   Substance Use Topics    Alcohol use: Not Currently    Drug use: Never       Family History  Family History   Problem Relation Age of Onset    Diabetes Father     Cancer Sister         cervical    Diabetes Sister        Allergies   Allergies

## 2025-03-07 NOTE — PROGRESS NOTES
[facility]     Outpatient Cardiology         Patient Name:  Shelley Maurer  Primary Care Physician: Clary Friedman, APRN - CNP  03/10/25     Assessment & Plan    Assessment / Plan:     Heart failure with recovered ejection fraction-echocardiogram in the past had shown mildly reduced LVEF.  Subsequent stress test showed normal EF.  Has rare leg swelling.  Currently appears to be euvolemic.  NYHA class II.  Continue bisoprolol and Entresto.  Essential hypertension-well-controlled.  Diabetes mellitus-HbA1c has significantly increased.  Patient working on blood sugar control.  Hyperlipidemia-stable.  Stable from cardiac standpoint.  No medication changes made.  Follow-up in 6 months              Chief Complaint:     Chief Complaint   Patient presents with    New Patient       History of Present Illness:       HPI     Shelley Maurer is a 79 y.o. female with PMH of diabetes, hypertension, systolic heart failure, hyperlipidemia, and chronic kidney disease.     Here for for management of heart failure. She was last seen by Bruna Cardenas NP 6/20/24 for management of heart failure. She was admitted 5/2024 with pneumonia and echo showed EF 40-45% and started on medical therapy. She completed a stress test  10/11/24 that showed improved EF, and was felt to be low intermediate risk.     Today she reports she gets short of breath at times while walking to the bathroom. She sleeps with two pillow at night.   Patient denies any chest pain, palpitations, presyncope or syncope. No TIA. No claudication. No recent hospitalizations    PMH  Past Medical History:   Diagnosis Date    Meredith esophagus     Bladder prolapse     Depression     Diabetes mellitus (HCC)     Hyperlipidemia     Osteoporosis     Tachycardia        PSH  Past Surgical History:   Procedure Laterality Date    BACK SURGERY      cervical    BLADDER SUSPENSION      X 3    CHOLECYSTECTOMY      COLONOSCOPY  5-3-16    normal

## 2025-03-10 ENCOUNTER — OFFICE VISIT (OUTPATIENT)
Dept: CARDIOLOGY CLINIC | Age: 80
End: 2025-03-10
Payer: MEDICARE

## 2025-03-10 VITALS
BODY MASS INDEX: 23.61 KG/M2 | OXYGEN SATURATION: 97 % | HEART RATE: 69 BPM | DIASTOLIC BLOOD PRESSURE: 60 MMHG | SYSTOLIC BLOOD PRESSURE: 110 MMHG | WEIGHT: 153 LBS

## 2025-03-10 DIAGNOSIS — E78.2 MIXED HYPERLIPIDEMIA: Primary | ICD-10-CM

## 2025-03-10 DIAGNOSIS — I50.20 HFREF (HEART FAILURE WITH REDUCED EJECTION FRACTION) (HCC): ICD-10-CM

## 2025-03-10 PROCEDURE — 3074F SYST BP LT 130 MM HG: CPT | Performed by: INTERNAL MEDICINE

## 2025-03-10 PROCEDURE — 1159F MED LIST DOCD IN RCRD: CPT | Performed by: INTERNAL MEDICINE

## 2025-03-10 PROCEDURE — 3078F DIAST BP <80 MM HG: CPT | Performed by: INTERNAL MEDICINE

## 2025-03-10 PROCEDURE — 1160F RVW MEDS BY RX/DR IN RCRD: CPT | Performed by: INTERNAL MEDICINE

## 2025-03-10 PROCEDURE — 99204 OFFICE O/P NEW MOD 45 MIN: CPT | Performed by: INTERNAL MEDICINE

## 2025-03-10 PROCEDURE — 1123F ACP DISCUSS/DSCN MKR DOCD: CPT | Performed by: INTERNAL MEDICINE

## 2025-03-10 PROCEDURE — 93000 ELECTROCARDIOGRAM COMPLETE: CPT | Performed by: INTERNAL MEDICINE

## 2025-03-18 ENCOUNTER — OFFICE VISIT (OUTPATIENT)
Dept: FAMILY MEDICINE CLINIC | Age: 80
End: 2025-03-18
Payer: MEDICARE

## 2025-03-18 VITALS
HEART RATE: 67 BPM | WEIGHT: 152 LBS | SYSTOLIC BLOOD PRESSURE: 104 MMHG | DIASTOLIC BLOOD PRESSURE: 60 MMHG | OXYGEN SATURATION: 98 % | RESPIRATION RATE: 16 BRPM | BODY MASS INDEX: 23.04 KG/M2 | TEMPERATURE: 97.1 F | HEIGHT: 68 IN

## 2025-03-18 DIAGNOSIS — F51.01 PRIMARY INSOMNIA: ICD-10-CM

## 2025-03-18 DIAGNOSIS — Z00.00 WELCOME TO MEDICARE PREVENTIVE VISIT: ICD-10-CM

## 2025-03-18 DIAGNOSIS — E11.9 TYPE 2 DIABETES MELLITUS WITHOUT COMPLICATION, WITHOUT LONG-TERM CURRENT USE OF INSULIN: Primary | ICD-10-CM

## 2025-03-18 LAB — HBA1C MFR BLD: 10.5 %

## 2025-03-18 PROCEDURE — 99214 OFFICE O/P EST MOD 30 MIN: CPT | Performed by: NURSE PRACTITIONER

## 2025-03-18 PROCEDURE — 1159F MED LIST DOCD IN RCRD: CPT | Performed by: NURSE PRACTITIONER

## 2025-03-18 PROCEDURE — 83036 HEMOGLOBIN GLYCOSYLATED A1C: CPT | Performed by: NURSE PRACTITIONER

## 2025-03-18 PROCEDURE — 3078F DIAST BP <80 MM HG: CPT | Performed by: NURSE PRACTITIONER

## 2025-03-18 PROCEDURE — G0438 PPPS, INITIAL VISIT: HCPCS | Performed by: NURSE PRACTITIONER

## 2025-03-18 PROCEDURE — 3046F HEMOGLOBIN A1C LEVEL >9.0%: CPT | Performed by: NURSE PRACTITIONER

## 2025-03-18 PROCEDURE — 1123F ACP DISCUSS/DSCN MKR DOCD: CPT | Performed by: NURSE PRACTITIONER

## 2025-03-18 PROCEDURE — 3074F SYST BP LT 130 MM HG: CPT | Performed by: NURSE PRACTITIONER

## 2025-03-18 RX ORDER — GINSENG 100 MG
2 CAPSULE ORAL DAILY
COMMUNITY

## 2025-03-18 RX ORDER — DESVENLAFAXINE 25 MG/1
25 TABLET, EXTENDED RELEASE ORAL DAILY
COMMUNITY

## 2025-03-18 RX ORDER — INSULIN GLARGINE 100 [IU]/ML
25 INJECTION, SOLUTION SUBCUTANEOUS NIGHTLY
Qty: 5 ADJUSTABLE DOSE PRE-FILLED PEN SYRINGE | Refills: 0 | Status: SHIPPED | OUTPATIENT
Start: 2025-03-18

## 2025-03-18 RX ORDER — VALACYCLOVIR HYDROCHLORIDE 500 MG/1
500 TABLET, FILM COATED ORAL
COMMUNITY

## 2025-03-18 RX ORDER — INSULIN LISPRO 100 [IU]/ML
15 INJECTION, SOLUTION INTRAVENOUS; SUBCUTANEOUS
Qty: 10 EACH | Refills: 5 | Status: SHIPPED | OUTPATIENT
Start: 2025-03-18

## 2025-03-18 RX ORDER — BUSPIRONE HYDROCHLORIDE 5 MG/1
5 TABLET ORAL 3 TIMES DAILY
COMMUNITY

## 2025-03-18 RX ORDER — MIRABEGRON 50 MG/1
50 TABLET, FILM COATED, EXTENDED RELEASE ORAL NIGHTLY
COMMUNITY

## 2025-03-18 RX ORDER — INSULIN LISPRO 100 [IU]/ML
15 INJECTION, SOLUTION INTRAVENOUS; SUBCUTANEOUS
COMMUNITY
End: 2025-03-18 | Stop reason: SDUPTHER

## 2025-03-18 RX ORDER — DESVENLAFAXINE 50 MG/1
50 TABLET, FILM COATED, EXTENDED RELEASE ORAL DAILY
COMMUNITY

## 2025-03-18 RX ORDER — QUETIAPINE FUMARATE 100 MG/1
100 TABLET, FILM COATED ORAL NIGHTLY
Qty: 90 TABLET | Refills: 0 | Status: SHIPPED | OUTPATIENT
Start: 2025-03-18 | End: 2025-06-16

## 2025-03-18 RX ORDER — ACYCLOVIR 400 MG/1
TABLET ORAL
Qty: 9 EACH | Refills: 5 | Status: SHIPPED | OUTPATIENT
Start: 2025-03-18

## 2025-03-18 RX ORDER — KETOCONAZOLE 20 MG/ML
SHAMPOO, SUSPENSION TOPICAL
Qty: 120 ML | Refills: 5 | Status: SHIPPED | OUTPATIENT
Start: 2025-03-18

## 2025-03-18 RX ORDER — LOPERAMIDE HYDROCHLORIDE 2 MG/1
2 CAPSULE ORAL 4 TIMES DAILY PRN
COMMUNITY

## 2025-03-18 ASSESSMENT — PATIENT HEALTH QUESTIONNAIRE - PHQ9
SUM OF ALL RESPONSES TO PHQ QUESTIONS 1-9: 12
3. TROUBLE FALLING OR STAYING ASLEEP: NEARLY EVERY DAY
SUM OF ALL RESPONSES TO PHQ QUESTIONS 1-9: 12
8. MOVING OR SPEAKING SO SLOWLY THAT OTHER PEOPLE COULD HAVE NOTICED. OR THE OPPOSITE, BEING SO FIGETY OR RESTLESS THAT YOU HAVE BEEN MOVING AROUND A LOT MORE THAN USUAL: NOT AT ALL
6. FEELING BAD ABOUT YOURSELF - OR THAT YOU ARE A FAILURE OR HAVE LET YOURSELF OR YOUR FAMILY DOWN: NOT AT ALL
4. FEELING TIRED OR HAVING LITTLE ENERGY: NEARLY EVERY DAY
10. IF YOU CHECKED OFF ANY PROBLEMS, HOW DIFFICULT HAVE THESE PROBLEMS MADE IT FOR YOU TO DO YOUR WORK, TAKE CARE OF THINGS AT HOME, OR GET ALONG WITH OTHER PEOPLE: NOT DIFFICULT AT ALL
SUM OF ALL RESPONSES TO PHQ QUESTIONS 1-9: 15
9. THOUGHTS THAT YOU WOULD BE BETTER OFF DEAD, OR OF HURTING YOURSELF: NOT AT ALL
3. TROUBLE FALLING OR STAYING ASLEEP: NEARLY EVERY DAY
5. POOR APPETITE OR OVEREATING: NOT AT ALL
7. TROUBLE CONCENTRATING ON THINGS, SUCH AS READING THE NEWSPAPER OR WATCHING TELEVISION: NEARLY EVERY DAY
9. THOUGHTS THAT YOU WOULD BE BETTER OFF DEAD, OR OF HURTING YOURSELF: NOT AT ALL
10. IF YOU CHECKED OFF ANY PROBLEMS, HOW DIFFICULT HAVE THESE PROBLEMS MADE IT FOR YOU TO DO YOUR WORK, TAKE CARE OF THINGS AT HOME, OR GET ALONG WITH OTHER PEOPLE: NOT DIFFICULT AT ALL
4. FEELING TIRED OR HAVING LITTLE ENERGY: NEARLY EVERY DAY
SUM OF ALL RESPONSES TO PHQ QUESTIONS 1-9: 15
6. FEELING BAD ABOUT YOURSELF - OR THAT YOU ARE A FAILURE OR HAVE LET YOURSELF OR YOUR FAMILY DOWN: NOT AT ALL
SUM OF ALL RESPONSES TO PHQ QUESTIONS 1-9: 15
7. TROUBLE CONCENTRATING ON THINGS, SUCH AS READING THE NEWSPAPER OR WATCHING TELEVISION: NOT AT ALL
2. FEELING DOWN, DEPRESSED OR HOPELESS: NEARLY EVERY DAY
2. FEELING DOWN, DEPRESSED OR HOPELESS: NEARLY EVERY DAY
1. LITTLE INTEREST OR PLEASURE IN DOING THINGS: NEARLY EVERY DAY
SUM OF ALL RESPONSES TO PHQ QUESTIONS 1-9: 12
8. MOVING OR SPEAKING SO SLOWLY THAT OTHER PEOPLE COULD HAVE NOTICED. OR THE OPPOSITE, BEING SO FIGETY OR RESTLESS THAT YOU HAVE BEEN MOVING AROUND A LOT MORE THAN USUAL: NOT AT ALL
SUM OF ALL RESPONSES TO PHQ QUESTIONS 1-9: 12
SUM OF ALL RESPONSES TO PHQ QUESTIONS 1-9: 15
1. LITTLE INTEREST OR PLEASURE IN DOING THINGS: NEARLY EVERY DAY

## 2025-03-18 ASSESSMENT — LIFESTYLE VARIABLES
HOW OFTEN DO YOU HAVE A DRINK CONTAINING ALCOHOL: NEVER
HOW OFTEN DO YOU HAVE A DRINK CONTAINING ALCOHOL: NEVER
HOW MANY STANDARD DRINKS CONTAINING ALCOHOL DO YOU HAVE ON A TYPICAL DAY: PATIENT DOES NOT DRINK
HOW MANY STANDARD DRINKS CONTAINING ALCOHOL DO YOU HAVE ON A TYPICAL DAY: PATIENT DOES NOT DRINK

## 2025-03-18 NOTE — PATIENT INSTRUCTIONS
acetaminophen (Tylenol).   When should you call for help?   Call 911 if you have symptoms of a heart attack. These may include:    Chest pain or pressure, or a strange feeling in the chest.     Sweating.     Shortness of breath.     Pain, pressure, or a strange feeling in the back, neck, jaw, or upper belly or in one or both shoulders or arms.     Lightheadedness or sudden weakness.     A fast or irregular heartbeat.   After you call 911, the  may tell you to chew 1 adult-strength or 2 to 4 low-dose aspirin. Wait for an ambulance. Do not try to drive yourself.  Watch closely for changes in your health, and be sure to contact your doctor if you have any problems.  Where can you learn more?  Go to https://www.[employer].net/patientEd and enter F075 to learn more about \"A Healthy Heart: Care Instructions.\"  Current as of: July 31, 2024  Content Version: 14.4  © 0771-8893 [employer]   Care instructions adapted under license by [employer]. If you have questions about a medical condition or this instruction, always ask your healthcare professional. [employer], disclaims any warranty or liability for your use of this information.    Personalized Preventive Plan for Shelley Maurer - 3/18/2025  Medicare offers a range of preventive health benefits. Some of the tests and screenings are paid in full while other may be subject to a deductible, co-insurance, and/or copay.  Some of these benefits include a comprehensive review of your medical history including lifestyle, illnesses that may run in your family, and various assessments and screenings as appropriate.  After reviewing your medical record and screening and assessments performed today your provider may have ordered immunizations, labs, imaging, and/or referrals for you.  A list of these orders (if applicable) as well as your Preventive Care list are included within your After Visit Summary for your review.

## 2025-03-18 NOTE — PROGRESS NOTES
Yes Provider, MD Suha   Multiple Vitamins-Minerals (THERAPEUTIC MULTIVITAMIN-MINERALS) tablet Take 1 tablet by mouth daily  Suha Schumacher MD   mirtazapine (REMERON) 15 MG tablet Take 0.5 tablets by mouth nightly  ProviderSuha MD   Menthol, Topical Analgesic, (BIOFREEZE) 4 % GEL Apply topically  ProviderSuha MD CareTeam (Including outside providers/suppliers regularly involved in providing care):   Patient Care Team:  Clary Friedman APRN - CNP as PCP - General (Internal Medicine)  Clary Friedman APRN - CNP as PCP - Empaneled Provider     Recommendations for Preventive Services Due: see orders and patient instructions/AVS.  Recommended screening schedule for the next 5-10 years is provided to the patient in written form: see Patient Instructions/AVS.     Reviewed and updated this visit:  Allergies  Meds  Sexual Hx                  The patient (or guardian, if applicable) and other individuals in attendance with the patient were advised that Artificial Intelligence will be utilized during this visit to record and process the conversation to generate a clinical note. The patient (or guardian, if applicable) and other individuals in attendance at the appointment consented to the use of AI, including the recording.

## 2025-03-18 NOTE — ASSESSMENT & PLAN NOTE
Her A1c level has shown a decrease from 11.5 to 10.5 over the past 30 days, indicating progress. However, her blood sugar levels remain elevated. The dosage of Humalog will be increased from 10 units to 15 units, to be administered three times daily with meals. The Lantus dosage will be adjusted from 20 units to 25 units nightly. A prescription for Dexcom will be sent to her new pharmacy.

## 2025-03-18 NOTE — ASSESSMENT & PLAN NOTE
She reports difficulty sleeping, often not falling asleep until 5 AM. The dosage of Seroquel will be increased from 50 mg to 100 mg nightly to help improve her sleep. Melatonin and ramelteon will be discontinued as they have not been effective.

## 2025-03-19 ENCOUNTER — TELEPHONE (OUTPATIENT)
Dept: FAMILY MEDICINE CLINIC | Age: 80
End: 2025-03-19

## 2025-03-19 NOTE — TELEPHONE ENCOUNTER
Kristina - Nurse from the [facility].  Patient would like to stop her Remeron medication. Patient states its making her eat to much.     Is she okay to stop this medication?

## 2025-04-02 ENCOUNTER — TELEPHONE (OUTPATIENT)
Dept: FAMILY MEDICINE CLINIC | Age: 80
End: 2025-04-02

## 2025-04-02 NOTE — TELEPHONE ENCOUNTER
[facility] says patient is still experiencing hip pain and would like something stronger than tramadol sent to her pharmacy. If you have any questions contact the staff at [phone]    MISAEL BERMAN  [place], [place] - [address] [place] [address] [phone] - F [phone] [[id number]]

## 2025-04-03 ENCOUNTER — TELEMEDICINE (OUTPATIENT)
Dept: FAMILY MEDICINE CLINIC | Age: 80
End: 2025-04-03

## 2025-04-03 DIAGNOSIS — M25.552 LEFT HIP PAIN: ICD-10-CM

## 2025-04-03 DIAGNOSIS — E11.40 CONTROLLED TYPE 2 DIABETES MELLITUS WITH DIABETIC NEUROPATHY, WITHOUT LONG-TERM CURRENT USE OF INSULIN (HCC): Primary | ICD-10-CM

## 2025-04-03 RX ORDER — BACLOFEN 10 MG/1
10 TABLET ORAL 3 TIMES DAILY PRN
Qty: 21 TABLET | Refills: 0 | Status: SHIPPED | OUTPATIENT
Start: 2025-04-03 | End: 2025-04-10

## 2025-04-03 RX ORDER — PREDNISONE 10 MG/1
10 TABLET ORAL DAILY
Qty: 5 TABLET | Refills: 0 | Status: SHIPPED | OUTPATIENT
Start: 2025-04-03 | End: 2025-04-08

## 2025-04-03 ASSESSMENT — ENCOUNTER SYMPTOMS
NAUSEA: 0
EYE ITCHING: 0
BACK PAIN: 0
COLOR CHANGE: 0
WHEEZING: 0
VOMITING: 0
SORE THROAT: 0
CONSTIPATION: 0
DIARRHEA: 0
ABDOMINAL PAIN: 0
SHORTNESS OF BREATH: 0
CHEST TIGHTNESS: 0
RHINORRHEA: 0
EYE REDNESS: 0
BLOOD IN STOOL: 0
SINUS PRESSURE: 0
COUGH: 0

## 2025-04-03 NOTE — PROGRESS NOTES
Shelley Maurer, was evaluated through a synchronous (real-time) audio-video encounter. The patient (or guardian if applicable) is aware that this is a billable service, which includes applicable co-pays. This Virtual Visit was conducted with patient's (and/or legal guardian's) consent. Patient identification was verified, and a caregiver was present when appropriate.   The patient was located at Home: [facility] [place] [address]   Provider was located at Facility (Appt Dept): [address] [place] [address]  Confirm you are appropriately licensed, registered, or certified to deliver care in the state where the patient is located as indicated above. If you are not or unsure, please re-schedule the visit: Yes, I confirm.     Shelley Maurer (:  1945) is a 79 y.o. female, Established patient, here for evaluation of the following chief complaint(s):  Hip Pain (Left hip pain. Call [phone]  ask for Terese )         Assessment & Plan  Controlled type 2 diabetes mellitus with diabetic neuropathy, without long-term current use of insulin (HCC)   - Blood sugar levels reported to be over 200, with fasting blood sugar at 181.  - Prednisone administration is expected to raise blood sugar levels temporarily.  - Increase in Lantus dosage to 35 units nightly and Humalog to 20 units three times daily to manage anticipated rise in blood sugar levels.         Left hip pain   - Pain described as originating from the groin, extending to the left hip, down the front of the leg, and over the top of the knee.  - Presence of numbness and tingling down the leg suggests possible nerve involvement.  - Discussion regarding the ineffectiveness of tramadol due to lack of anti-inflammatory properties and the need for anti-inflammatory medication.  - Prescription of prednisone 10 mg daily for 5 days and baclofen 10 mg three times daily as needed for 1 week to manage pain and

## 2025-04-03 NOTE — ASSESSMENT & PLAN NOTE
- Pain described as originating from the groin, extending to the left hip, down the front of the leg, and over the top of the knee.  - Presence of numbness and tingling down the leg suggests possible nerve involvement.  - Discussion regarding the ineffectiveness of tramadol due to lack of anti-inflammatory properties and the need for anti-inflammatory medication.  - Prescription of prednisone 10 mg daily for 5 days and baclofen 10 mg three times daily as needed for 1 week to manage pain and inflammation.

## 2025-04-03 NOTE — ASSESSMENT & PLAN NOTE
- Blood sugar levels reported to be over 200, with fasting blood sugar at 181.  - Prednisone administration is expected to raise blood sugar levels temporarily.  - Increase in Lantus dosage to 35 units nightly and Humalog to 20 units three times daily to manage anticipated rise in blood sugar levels.

## 2025-04-06 ENCOUNTER — HOSPITAL ENCOUNTER (INPATIENT)
Age: 80
LOS: 1 days | Discharge: HOME HEALTH | DRG: 552 | End: 2025-04-09
Attending: EMERGENCY MEDICINE | Admitting: SURGERY
Payer: MEDICARE

## 2025-04-06 ENCOUNTER — APPOINTMENT (OUTPATIENT)
Dept: MRI IMAGING | Age: 80
DRG: 552 | End: 2025-04-06
Payer: MEDICARE

## 2025-04-06 ENCOUNTER — APPOINTMENT (OUTPATIENT)
Dept: CT IMAGING | Age: 80
DRG: 552 | End: 2025-04-06
Payer: MEDICARE

## 2025-04-06 DIAGNOSIS — M25.552 LEFT HIP PAIN: Primary | ICD-10-CM

## 2025-04-06 DIAGNOSIS — R10.32 LEFT GROIN PAIN: ICD-10-CM

## 2025-04-06 PROBLEM — R52 INTRACTABLE PAIN: Status: ACTIVE | Noted: 2025-04-06

## 2025-04-06 LAB
ANION GAP SERPL CALCULATED.3IONS-SCNC: 12 MMOL/L (ref 3–16)
BUN SERPL-MCNC: 34 MG/DL (ref 7–20)
CALCIUM SERPL-MCNC: 9.4 MG/DL (ref 8.3–10.6)
CHLORIDE SERPL-SCNC: 104 MMOL/L (ref 99–110)
CO2 SERPL-SCNC: 22 MMOL/L (ref 21–32)
CREAT SERPL-MCNC: 1.8 MG/DL (ref 0.6–1.2)
GFR SERPLBLD CREATININE-BSD FMLA CKD-EPI: 28 ML/MIN/{1.73_M2}
GLUCOSE BLD-MCNC: 137 MG/DL (ref 70–99)
GLUCOSE SERPL-MCNC: 134 MG/DL (ref 70–99)
PERFORMED ON: ABNORMAL
POTASSIUM SERPL-SCNC: 5.2 MMOL/L (ref 3.5–5.1)
SODIUM SERPL-SCNC: 138 MMOL/L (ref 136–145)

## 2025-04-06 PROCEDURE — 6360000002 HC RX W HCPCS: Performed by: STUDENT IN AN ORGANIZED HEALTH CARE EDUCATION/TRAINING PROGRAM

## 2025-04-06 PROCEDURE — 6360000004 HC RX CONTRAST MEDICATION: Performed by: STUDENT IN AN ORGANIZED HEALTH CARE EDUCATION/TRAINING PROGRAM

## 2025-04-06 PROCEDURE — 99285 EMERGENCY DEPT VISIT HI MDM: CPT

## 2025-04-06 PROCEDURE — G0378 HOSPITAL OBSERVATION PER HR: HCPCS

## 2025-04-06 PROCEDURE — 96372 THER/PROPH/DIAG INJ SC/IM: CPT

## 2025-04-06 PROCEDURE — 72158 MRI LUMBAR SPINE W/O & W/DYE: CPT

## 2025-04-06 PROCEDURE — 96374 THER/PROPH/DIAG INJ IV PUSH: CPT

## 2025-04-06 PROCEDURE — A9579 GAD-BASE MR CONTRAST NOS,1ML: HCPCS | Performed by: STUDENT IN AN ORGANIZED HEALTH CARE EDUCATION/TRAINING PROGRAM

## 2025-04-06 PROCEDURE — 6360000002 HC RX W HCPCS: Performed by: EMERGENCY MEDICINE

## 2025-04-06 PROCEDURE — 96376 TX/PRO/DX INJ SAME DRUG ADON: CPT

## 2025-04-06 PROCEDURE — 80048 BASIC METABOLIC PNL TOTAL CA: CPT

## 2025-04-06 PROCEDURE — 2500000003 HC RX 250 WO HCPCS: Performed by: STUDENT IN AN ORGANIZED HEALTH CARE EDUCATION/TRAINING PROGRAM

## 2025-04-06 PROCEDURE — 73700 CT LOWER EXTREMITY W/O DYE: CPT

## 2025-04-06 PROCEDURE — 6370000000 HC RX 637 (ALT 250 FOR IP): Performed by: STUDENT IN AN ORGANIZED HEALTH CARE EDUCATION/TRAINING PROGRAM

## 2025-04-06 PROCEDURE — 6370000000 HC RX 637 (ALT 250 FOR IP): Performed by: EMERGENCY MEDICINE

## 2025-04-06 RX ORDER — MORPHINE SULFATE 2 MG/ML
2 INJECTION, SOLUTION INTRAMUSCULAR; INTRAVENOUS ONCE
Status: COMPLETED | OUTPATIENT
Start: 2025-04-06 | End: 2025-04-06

## 2025-04-06 RX ORDER — POLYETHYLENE GLYCOL 3350 17 G/17G
17 POWDER, FOR SOLUTION ORAL DAILY PRN
Status: DISCONTINUED | OUTPATIENT
Start: 2025-04-06 | End: 2025-04-09 | Stop reason: HOSPADM

## 2025-04-06 RX ORDER — SODIUM CHLORIDE 0.9 % (FLUSH) 0.9 %
5-40 SYRINGE (ML) INJECTION EVERY 12 HOURS SCHEDULED
Status: DISCONTINUED | OUTPATIENT
Start: 2025-04-06 | End: 2025-04-09 | Stop reason: HOSPADM

## 2025-04-06 RX ORDER — SODIUM CHLORIDE 9 MG/ML
INJECTION, SOLUTION INTRAVENOUS PRN
Status: DISCONTINUED | OUTPATIENT
Start: 2025-04-06 | End: 2025-04-09 | Stop reason: HOSPADM

## 2025-04-06 RX ORDER — ONDANSETRON 4 MG/1
4 TABLET, ORALLY DISINTEGRATING ORAL EVERY 8 HOURS PRN
Status: DISCONTINUED | OUTPATIENT
Start: 2025-04-06 | End: 2025-04-09 | Stop reason: HOSPADM

## 2025-04-06 RX ORDER — MAGNESIUM SULFATE IN WATER 40 MG/ML
2000 INJECTION, SOLUTION INTRAVENOUS PRN
Status: DISCONTINUED | OUTPATIENT
Start: 2025-04-06 | End: 2025-04-06 | Stop reason: ALTCHOICE

## 2025-04-06 RX ORDER — HYDROMORPHONE HYDROCHLORIDE 1 MG/ML
0.5 INJECTION, SOLUTION INTRAMUSCULAR; INTRAVENOUS; SUBCUTANEOUS EVERY 4 HOURS PRN
Status: DISPENSED | OUTPATIENT
Start: 2025-04-06 | End: 2025-04-08

## 2025-04-06 RX ORDER — POTASSIUM CHLORIDE 1500 MG/1
40 TABLET, EXTENDED RELEASE ORAL PRN
Status: DISCONTINUED | OUTPATIENT
Start: 2025-04-06 | End: 2025-04-06

## 2025-04-06 RX ORDER — POTASSIUM CHLORIDE 7.45 MG/ML
10 INJECTION INTRAVENOUS PRN
Status: DISCONTINUED | OUTPATIENT
Start: 2025-04-06 | End: 2025-04-06

## 2025-04-06 RX ORDER — GABAPENTIN 300 MG/1
300 CAPSULE ORAL ONCE
Status: COMPLETED | OUTPATIENT
Start: 2025-04-06 | End: 2025-04-06

## 2025-04-06 RX ORDER — METHOCARBAMOL 500 MG/1
750 TABLET, FILM COATED ORAL ONCE
Status: COMPLETED | OUTPATIENT
Start: 2025-04-06 | End: 2025-04-06

## 2025-04-06 RX ORDER — ONDANSETRON 2 MG/ML
4 INJECTION INTRAMUSCULAR; INTRAVENOUS EVERY 6 HOURS PRN
Status: DISCONTINUED | OUTPATIENT
Start: 2025-04-06 | End: 2025-04-09 | Stop reason: HOSPADM

## 2025-04-06 RX ORDER — SODIUM CHLORIDE 0.9 % (FLUSH) 0.9 %
5-40 SYRINGE (ML) INJECTION PRN
Status: DISCONTINUED | OUTPATIENT
Start: 2025-04-06 | End: 2025-04-09 | Stop reason: HOSPADM

## 2025-04-06 RX ORDER — ACETAMINOPHEN 650 MG/1
650 SUPPOSITORY RECTAL EVERY 6 HOURS PRN
Status: DISCONTINUED | OUTPATIENT
Start: 2025-04-06 | End: 2025-04-09 | Stop reason: HOSPADM

## 2025-04-06 RX ORDER — OXYCODONE HYDROCHLORIDE 5 MG/1
5 TABLET ORAL EVERY 4 HOURS PRN
Status: DISCONTINUED | OUTPATIENT
Start: 2025-04-06 | End: 2025-04-09 | Stop reason: HOSPADM

## 2025-04-06 RX ORDER — ACETAMINOPHEN 325 MG/1
650 TABLET ORAL EVERY 6 HOURS PRN
Status: DISCONTINUED | OUTPATIENT
Start: 2025-04-06 | End: 2025-04-09 | Stop reason: HOSPADM

## 2025-04-06 RX ADMIN — ACETAMINOPHEN 650 MG: 325 TABLET, FILM COATED ORAL at 14:57

## 2025-04-06 RX ADMIN — MORPHINE SULFATE 2 MG: 2 INJECTION, SOLUTION INTRAMUSCULAR; INTRAVENOUS at 08:24

## 2025-04-06 RX ADMIN — HYDROMORPHONE HYDROCHLORIDE 0.5 MG: 1 INJECTION, SOLUTION INTRAMUSCULAR; INTRAVENOUS; SUBCUTANEOUS at 16:16

## 2025-04-06 RX ADMIN — GADOTERIDOL 15 ML: 279.3 INJECTION, SOLUTION INTRAVENOUS at 14:15

## 2025-04-06 RX ADMIN — METHOCARBAMOL 750 MG: 500 TABLET ORAL at 11:30

## 2025-04-06 RX ADMIN — SODIUM CHLORIDE, PRESERVATIVE FREE 10 ML: 5 INJECTION INTRAVENOUS at 21:48

## 2025-04-06 RX ADMIN — HYDROMORPHONE HYDROCHLORIDE 0.5 MG: 1 INJECTION, SOLUTION INTRAMUSCULAR; INTRAVENOUS; SUBCUTANEOUS at 20:55

## 2025-04-06 RX ADMIN — GABAPENTIN 300 MG: 300 CAPSULE ORAL at 09:56

## 2025-04-06 ASSESSMENT — PAIN DESCRIPTION - FREQUENCY: FREQUENCY: CONTINUOUS

## 2025-04-06 ASSESSMENT — PAIN DESCRIPTION - LOCATION
LOCATION: GROIN
LOCATION: GROIN;HIP;LEG
LOCATION: HIP

## 2025-04-06 ASSESSMENT — PAIN SCALES - GENERAL
PAINLEVEL_OUTOF10: 6
PAINLEVEL_OUTOF10: 9
PAINLEVEL_OUTOF10: 5
PAINLEVEL_OUTOF10: 10
PAINLEVEL_OUTOF10: 9

## 2025-04-06 ASSESSMENT — PAIN DESCRIPTION - ORIENTATION
ORIENTATION: LEFT

## 2025-04-06 ASSESSMENT — PAIN DESCRIPTION - ONSET: ONSET: ON-GOING

## 2025-04-06 ASSESSMENT — PAIN DESCRIPTION - DESCRIPTORS
DESCRIPTORS: DISCOMFORT;SHARP;SHOOTING
DESCRIPTORS: SHOOTING;SHARP
DESCRIPTORS: STABBING;BURNING

## 2025-04-06 ASSESSMENT — PAIN - FUNCTIONAL ASSESSMENT
PAIN_FUNCTIONAL_ASSESSMENT: PREVENTS OR INTERFERES WITH MANY ACTIVE NOT PASSIVE ACTIVITIES
PAIN_FUNCTIONAL_ASSESSMENT: 0-10

## 2025-04-06 ASSESSMENT — PAIN DESCRIPTION - PAIN TYPE
TYPE: ACUTE PAIN
TYPE: ACUTE PAIN

## 2025-04-06 NOTE — ED PROVIDER NOTES
CRANBERRY 200 MG CAPS    Take 2 capsules by mouth daily    DESVENLAFAXINE SUCCINATE (PRISTIQ) 25 MG TB24 EXTENDED RELEASE TABLET    Take 1 tablet by mouth daily    DESVENLAFAXINE SUCCINATE (PRISTIQ) 50 MG TB24 EXTENDED RELEASE TABLET    Take 1 tablet by mouth daily    EMPAGLIFLOZIN (JARDIANCE) 25 MG TABLET    Take 1 tablet by mouth daily    FLUTICASONE (FLONASE) 50 MCG/ACT NASAL SPRAY        HANDICAP PLACARD MISC    by Does not apply route Expires 5 years after issue    INSULIN GLARGINE (LANTUS SOLOSTAR) 100 UNIT/ML INJECTION PEN    Inject 25 Units into the skin nightly    INSULIN LISPRO (HUMALOG) 100 UNIT/ML INJECTION CARTRIDGE    Inject 15 Units into the skin 3 times daily (before meals)    KETOCONAZOLE (NIZORAL) 2 % SHAMPOO    Apply topically daily as needed.    LOPERAMIDE (IMODIUM) 2 MG CAPSULE    Take 1 capsule by mouth 4 times daily as needed for Diarrhea    MAGNESIUM 200 MG CHEW    Take 400 mg by mouth in the morning and at bedtime    MECLIZINE (ANTIVERT) 12.5 MG TABLET    12.5 mg 3 TIMES DAILY (route: oral)    MENTHOL, TOPICAL ANALGESIC, (BIOFREEZE) 4 % GEL    Apply topically    MIRABEGRON (MYRBETRIQ) 50 MG TB24    Take 50 mg by mouth at bedtime    MIRTAZAPINE (REMERON) 15 MG TABLET    Take 0.5 tablets by mouth nightly    MULTIPLE VITAMINS-MINERALS (THERAPEUTIC MULTIVITAMIN-MINERALS) TABLET    Take 1 tablet by mouth daily    OMEPRAZOLE (PRILOSEC) 20 MG CAPSULE    Take 1 capsule by mouth 2 times daily.    ONDANSETRON (ZOFRAN) 4 MG TABLET    Take 1 tablet by mouth every 4 hours as needed for Nausea or Vomiting    PREDNISONE (DELTASONE) 10 MG TABLET    Take 1 tablet by mouth daily for 5 days    QUETIAPINE (SEROQUEL) 100 MG TABLET    Take 1 tablet by mouth at bedtime    SACUBITRIL-VALSARTAN (ENTRESTO) 24-26 MG PER TABLET    Take 1 tablet by mouth 2 times daily    SIMVASTATIN (ZOCOR) 20 MG TABLET    Take 1 tablet by mouth nightly    TRAMADOL (ULTRAM) 50 MG TABLET        VALACYCLOVIR (VALTREX) 500 MG TABLET

## 2025-04-06 NOTE — ED NOTES
Ankle Brachial index test completed. Resulted systolic of 119 from the brachial and 160 from the dorsalis pedis. ANJU result of 1.3     Chon Weaver RN  04/06/25 0817

## 2025-04-07 ENCOUNTER — TELEPHONE (OUTPATIENT)
Dept: FAMILY MEDICINE CLINIC | Age: 80
End: 2025-04-07

## 2025-04-07 ENCOUNTER — APPOINTMENT (OUTPATIENT)
Dept: MRI IMAGING | Age: 80
DRG: 552 | End: 2025-04-07
Payer: MEDICARE

## 2025-04-07 DIAGNOSIS — M25.512 PAIN OF LEFT SHOULDER REGION: ICD-10-CM

## 2025-04-07 DIAGNOSIS — S42.292A OTHER CLOSED DISPLACED FRACTURE OF PROXIMAL END OF LEFT HUMERUS, INITIAL ENCOUNTER: ICD-10-CM

## 2025-04-07 LAB
ALBUMIN SERPL-MCNC: 4.4 G/DL (ref 3.4–5)
ALBUMIN/GLOB SERPL: 1.3 {RATIO} (ref 1.1–2.2)
ALP SERPL-CCNC: 107 U/L (ref 40–129)
ALT SERPL-CCNC: 9 U/L (ref 10–40)
ANION GAP SERPL CALCULATED.3IONS-SCNC: 14 MMOL/L (ref 3–16)
AST SERPL-CCNC: 13 U/L (ref 15–37)
BASOPHILS # BLD: 0 K/UL (ref 0–0.2)
BASOPHILS NFR BLD: 0.5 %
BILIRUB SERPL-MCNC: <0.2 MG/DL (ref 0–1)
BUN SERPL-MCNC: 29 MG/DL (ref 7–20)
CALCIUM SERPL-MCNC: 9.4 MG/DL (ref 8.3–10.6)
CHLORIDE SERPL-SCNC: 102 MMOL/L (ref 99–110)
CO2 SERPL-SCNC: 21 MMOL/L (ref 21–32)
CREAT SERPL-MCNC: 1.5 MG/DL (ref 0.6–1.2)
DEPRECATED RDW RBC AUTO: 18 % (ref 12.4–15.4)
EOSINOPHIL # BLD: 0.2 K/UL (ref 0–0.6)
EOSINOPHIL NFR BLD: 3.2 %
GFR SERPLBLD CREATININE-BSD FMLA CKD-EPI: 35 ML/MIN/{1.73_M2}
GLUCOSE SERPL-MCNC: 144 MG/DL (ref 70–99)
HCT VFR BLD AUTO: 34.1 % (ref 36–48)
HGB BLD-MCNC: 11.1 G/DL (ref 12–16)
LYMPHOCYTES # BLD: 1.4 K/UL (ref 1–5.1)
LYMPHOCYTES NFR BLD: 21.7 %
MCH RBC QN AUTO: 26.8 PG (ref 26–34)
MCHC RBC AUTO-ENTMCNC: 32.4 G/DL (ref 31–36)
MCV RBC AUTO: 82.6 FL (ref 80–100)
MONOCYTES # BLD: 0.5 K/UL (ref 0–1.3)
MONOCYTES NFR BLD: 8.7 %
NEUTROPHILS # BLD: 4.1 K/UL (ref 1.7–7.7)
NEUTROPHILS NFR BLD: 65.9 %
PLATELET # BLD AUTO: 259 K/UL (ref 135–450)
PMV BLD AUTO: 8.1 FL (ref 5–10.5)
POTASSIUM SERPL-SCNC: 5.5 MMOL/L (ref 3.5–5.1)
POTASSIUM SERPL-SCNC: 5.5 MMOL/L (ref 3.5–5.1)
PROT SERPL-MCNC: 7.9 G/DL (ref 6.4–8.2)
RBC # BLD AUTO: 4.13 M/UL (ref 4–5.2)
SODIUM SERPL-SCNC: 137 MMOL/L (ref 136–145)
WBC # BLD AUTO: 6.3 K/UL (ref 4–11)

## 2025-04-07 PROCEDURE — G0378 HOSPITAL OBSERVATION PER HR: HCPCS

## 2025-04-07 PROCEDURE — 2500000003 HC RX 250 WO HCPCS: Performed by: STUDENT IN AN ORGANIZED HEALTH CARE EDUCATION/TRAINING PROGRAM

## 2025-04-07 PROCEDURE — 85025 COMPLETE CBC W/AUTO DIFF WBC: CPT

## 2025-04-07 PROCEDURE — 73721 MRI JNT OF LWR EXTRE W/O DYE: CPT

## 2025-04-07 PROCEDURE — 36415 COLL VENOUS BLD VENIPUNCTURE: CPT

## 2025-04-07 PROCEDURE — 6370000000 HC RX 637 (ALT 250 FOR IP): Performed by: STUDENT IN AN ORGANIZED HEALTH CARE EDUCATION/TRAINING PROGRAM

## 2025-04-07 PROCEDURE — 6370000000 HC RX 637 (ALT 250 FOR IP): Performed by: NURSE PRACTITIONER

## 2025-04-07 PROCEDURE — 96376 TX/PRO/DX INJ SAME DRUG ADON: CPT

## 2025-04-07 PROCEDURE — 6360000002 HC RX W HCPCS: Performed by: STUDENT IN AN ORGANIZED HEALTH CARE EDUCATION/TRAINING PROGRAM

## 2025-04-07 PROCEDURE — 80053 COMPREHEN METABOLIC PANEL: CPT

## 2025-04-07 RX ORDER — METOPROLOL SUCCINATE 50 MG/1
50 TABLET, EXTENDED RELEASE ORAL DAILY
Status: DISCONTINUED | OUTPATIENT
Start: 2025-04-07 | End: 2025-04-09 | Stop reason: HOSPADM

## 2025-04-07 RX ORDER — ATORVASTATIN CALCIUM 20 MG/1
20 TABLET, FILM COATED ORAL DAILY
Status: DISCONTINUED | OUTPATIENT
Start: 2025-04-07 | End: 2025-04-09 | Stop reason: HOSPADM

## 2025-04-07 RX ORDER — BUSPIRONE HYDROCHLORIDE 5 MG/1
5 TABLET ORAL 3 TIMES DAILY
Status: DISCONTINUED | OUTPATIENT
Start: 2025-04-07 | End: 2025-04-09 | Stop reason: HOSPADM

## 2025-04-07 RX ORDER — IBUPROFEN 600 MG/1
600 TABLET, FILM COATED ORAL
COMMUNITY

## 2025-04-07 RX ORDER — RAMELTEON 8 MG/1
8 TABLET ORAL NIGHTLY
COMMUNITY

## 2025-04-07 RX ORDER — QUETIAPINE FUMARATE 100 MG/1
100 TABLET, FILM COATED ORAL NIGHTLY
Status: DISCONTINUED | OUTPATIENT
Start: 2025-04-07 | End: 2025-04-09 | Stop reason: HOSPADM

## 2025-04-07 RX ORDER — MICONAZOLE NITRATE 20 MG/G
CREAM TOPICAL DAILY PRN
COMMUNITY

## 2025-04-07 RX ORDER — VALACYCLOVIR HYDROCHLORIDE 500 MG/1
500 TABLET, FILM COATED ORAL EVERY OTHER DAY
Status: DISCONTINUED | OUTPATIENT
Start: 2025-04-07 | End: 2025-04-09 | Stop reason: HOSPADM

## 2025-04-07 RX ORDER — TRAMADOL HYDROCHLORIDE 50 MG/1
50 TABLET ORAL EVERY 6 HOURS PRN
Qty: 28 TABLET | Refills: 0 | Status: CANCELLED | OUTPATIENT
Start: 2025-04-07 | End: 2025-04-14

## 2025-04-07 RX ORDER — BACLOFEN 10 MG/1
10 TABLET ORAL 3 TIMES DAILY PRN
Status: DISCONTINUED | OUTPATIENT
Start: 2025-04-07 | End: 2025-04-07

## 2025-04-07 RX ORDER — MECOBALAMIN 5000 MCG
5 TABLET,DISINTEGRATING ORAL NIGHTLY PRN
Status: DISCONTINUED | OUTPATIENT
Start: 2025-04-07 | End: 2025-04-09 | Stop reason: HOSPADM

## 2025-04-07 RX ORDER — MECLIZINE HYDROCHLORIDE 25 MG/1
12.5 TABLET ORAL 3 TIMES DAILY PRN
Status: DISCONTINUED | OUTPATIENT
Start: 2025-04-07 | End: 2025-04-09 | Stop reason: HOSPADM

## 2025-04-07 RX ORDER — TROSPIUM CHLORIDE 20 MG/1
20 TABLET, FILM COATED ORAL NIGHTLY
Status: DISCONTINUED | OUTPATIENT
Start: 2025-04-07 | End: 2025-04-09 | Stop reason: HOSPADM

## 2025-04-07 RX ORDER — HYDROCODONE BITARTRATE AND ACETAMINOPHEN 5; 325 MG/1; MG/1
1 TABLET ORAL
COMMUNITY

## 2025-04-07 RX ORDER — TROSPIUM CHLORIDE 20 MG/1
20 TABLET, FILM COATED ORAL
Status: DISCONTINUED | OUTPATIENT
Start: 2025-04-07 | End: 2025-04-07

## 2025-04-07 RX ORDER — MENTHOL 1.4 %
ADHESIVE PATCH, MEDICATED TOPICAL 3 TIMES DAILY PRN
Status: DISCONTINUED | OUTPATIENT
Start: 2025-04-07 | End: 2025-04-09 | Stop reason: HOSPADM

## 2025-04-07 RX ORDER — ASPIRIN 81 MG/1
81 TABLET, CHEWABLE ORAL DAILY
Status: DISCONTINUED | OUTPATIENT
Start: 2025-04-07 | End: 2025-04-09 | Stop reason: HOSPADM

## 2025-04-07 RX ADMIN — TROSPIUM CHLORIDE 20 MG: 20 TABLET, FILM COATED ORAL at 20:46

## 2025-04-07 RX ADMIN — ASPIRIN 81 MG: 81 TABLET, CHEWABLE ORAL at 16:58

## 2025-04-07 RX ADMIN — SODIUM CHLORIDE, PRESERVATIVE FREE 10 ML: 5 INJECTION INTRAVENOUS at 20:46

## 2025-04-07 RX ADMIN — Medication 5 MG: at 20:46

## 2025-04-07 RX ADMIN — OXYCODONE 5 MG: 5 TABLET ORAL at 16:58

## 2025-04-07 RX ADMIN — ATORVASTATIN CALCIUM 20 MG: 20 TABLET, FILM COATED ORAL at 16:59

## 2025-04-07 RX ADMIN — SACUBITRIL AND VALSARTAN 1 TABLET: 24; 26 TABLET, FILM COATED ORAL at 20:46

## 2025-04-07 RX ADMIN — BUSPIRONE HYDROCHLORIDE 5 MG: 5 TABLET ORAL at 20:46

## 2025-04-07 RX ADMIN — HYDROMORPHONE HYDROCHLORIDE 0.5 MG: 1 INJECTION, SOLUTION INTRAMUSCULAR; INTRAVENOUS; SUBCUTANEOUS at 09:34

## 2025-04-07 RX ADMIN — HYDROMORPHONE HYDROCHLORIDE 0.5 MG: 1 INJECTION, SOLUTION INTRAMUSCULAR; INTRAVENOUS; SUBCUTANEOUS at 00:31

## 2025-04-07 RX ADMIN — QUETIAPINE FUMARATE 100 MG: 100 TABLET ORAL at 20:46

## 2025-04-07 RX ADMIN — VALACYCLOVIR HYDROCHLORIDE 500 MG: 500 TABLET, FILM COATED ORAL at 18:36

## 2025-04-07 RX ADMIN — OXYCODONE 5 MG: 5 TABLET ORAL at 03:45

## 2025-04-07 RX ADMIN — OXYCODONE 5 MG: 5 TABLET ORAL at 09:10

## 2025-04-07 RX ADMIN — SODIUM ZIRCONIUM CYCLOSILICATE 10 G: 10 POWDER, FOR SUSPENSION ORAL at 16:58

## 2025-04-07 RX ADMIN — EMPAGLIFLOZIN 10 MG: 10 TABLET, FILM COATED ORAL at 16:58

## 2025-04-07 RX ADMIN — BUSPIRONE HYDROCHLORIDE 5 MG: 5 TABLET ORAL at 16:59

## 2025-04-07 RX ADMIN — HYDROMORPHONE HYDROCHLORIDE 0.5 MG: 1 INJECTION, SOLUTION INTRAMUSCULAR; INTRAVENOUS; SUBCUTANEOUS at 20:45

## 2025-04-07 RX ADMIN — MUSCLE RUB CREAM: 100; 150 CREAM TOPICAL at 05:35

## 2025-04-07 RX ADMIN — METOPROLOL SUCCINATE 50 MG: 50 TABLET, EXTENDED RELEASE ORAL at 16:58

## 2025-04-07 ASSESSMENT — PAIN SCALES - GENERAL
PAINLEVEL_OUTOF10: 5
PAINLEVEL_OUTOF10: 9
PAINLEVEL_OUTOF10: 6
PAINLEVEL_OUTOF10: 9
PAINLEVEL_OUTOF10: 10
PAINLEVEL_OUTOF10: 7
PAINLEVEL_OUTOF10: 6
PAINLEVEL_OUTOF10: 8

## 2025-04-07 ASSESSMENT — PAIN DESCRIPTION - ORIENTATION
ORIENTATION: LEFT

## 2025-04-07 ASSESSMENT — PAIN DESCRIPTION - LOCATION
LOCATION: LEG;HIP;GROIN
LOCATION: HIP;GROIN;LEG
LOCATION: BACK;HIP;GROIN;LEG
LOCATION: BACK;GROIN;HIP;LEG

## 2025-04-07 ASSESSMENT — PAIN DESCRIPTION - DESCRIPTORS
DESCRIPTORS: STABBING
DESCRIPTORS: ACHING;SHARP;STABBING
DESCRIPTORS: ACHING
DESCRIPTORS: ACHING;STABBING;SHARP

## 2025-04-07 ASSESSMENT — PAIN - FUNCTIONAL ASSESSMENT: PAIN_FUNCTIONAL_ASSESSMENT: PREVENTS OR INTERFERES SOME ACTIVE ACTIVITIES AND ADLS

## 2025-04-07 NOTE — CONSULTS
[facility] Orthopedic Surgery  Consult Note         This patient is seen in consultation at the request of hCas Torrez MD     Reason for Consult:  Left hip groin pain.    CHIEF COMPLAINT:  Left hip groin pain.    History Obtained From:  patient, electronic medical record    HISTORY OF PRESENT ILLNESS:   Ms. Maurer 79 y.o.  female seen today at [facility] for evaluation of left hip pain which started March 2025 with no known injury.  She is complaining of sharp left groin pain.  Pain is increase with standing and walking and decrease with rest. Pain is sharp early in the morning with first few steps, dull achy pain by the end of the day. No radiation and no numbness and tingling sensation. No other complaint.  No h/o trauma or gout. She got admitted to [facility] on 4/6/2025 for the hip pain.    Past Medical History:        Diagnosis Date    Meredith esophagus     Bladder prolapse     Depression     Diabetes mellitus (HCC)     Hyperlipidemia     Osteoporosis     Tachycardia        Past Surgical History:        Procedure Laterality Date    BACK SURGERY      cervical    BLADDER SUSPENSION      X 3    CHOLECYSTECTOMY      COLONOSCOPY  5-3-16    normal    HYSTERECTOMY (CERVIX STATUS UNKNOWN)      NOSE SURGERY      fracture    TOE SURGERY      UPPER GASTROINTESTINAL ENDOSCOPY  2/11/13    UPPER GASTROINTESTINAL ENDOSCOPY  5-3-16    biopsy esophagus + dilatation    WRIST SURGERY         Medications prior to admission:   Prior to Admission medications    Medication Sig Start Date End Date Taking? Authorizing Provider   miconazole (MICOTIN) 2 % cream Apply topically daily as needed (to esther area for skin breakdown)   Yes ProviderSuha MD   HYDROcodone-acetaminophen (NORCO) 5-325 MG per tablet Take 1 tablet by mouth every 4-6 hours as needed for Pain.   Yes ProviderSuha MD   ibuprofen (ADVIL;MOTRIN) 600 MG tablet Take 1 tablet by mouth every 4-6 hours as needed for Pain   Yes ProviderSuha

## 2025-04-07 NOTE — TELEPHONE ENCOUNTER
Medication:   Requested Prescriptions      No prescriptions requested or ordered in this encounter        Last Filled:      Patient Phone Number: [phone] (home) [phone] (work)    Last appt: 4/3/2025   Next appt: 4/17/2025    Last OARRS:        No data to display

## 2025-04-07 NOTE — CONSULTS
Clinical Pharmacy Consult Note  Medication History     Admit Date: 4/6/2025    Pharmacy consulted to verify home medication list by Chas Betancourt MD .    List of current medications patient is taking is complete. Home Medication list in Epic updated to reflect changes noted below.    Source of information: Facility med list from [facility]     Patient's [place] pharmacy: [place] - [address] [facility] [phone] [facility] [phone]     Changes made to medication list:   Medications removed: (include reason, ex: therapy completed, patient no longer taking, etc.)  Menthol 4% gel  Mirtazapine  Multivitamin  Medications added:   Miconazole 2% cream  Hydrocodone/acetaminophen  Ibuprofen  Melatonin  Ramelteon  Medication doses adjusted:   Insulin glargine 25 units QHS ? 35 units QHS  Insulin lispro 15 units TID AC ? 20 units TID AC  Other notes:   Pt takes desvenlafaxine 25 mg + 50 mg for total dose of 75 mg    Current Outpatient Medications   Medication Instructions    acetaminophen (TYLENOL) 650 mg, Oral, EVERY 6 HOURS PRN    ARTIFICIAL TEAR SOLUTION OP 1 drop, Ophthalmic, 2 times daily, RIGHT EYE    aspirin 81 mg, Oral, DAILY    baclofen (LIORESAL) 10 mg, Oral, 3 TIMES DAILY PRN    bisoprolol (ZEBETA) 5 mg, Oral, DAILY,      busPIRone (BUSPAR) 5 mg, Oral, 3 TIMES DAILY    Continuous Glucose Sensor (DEXCOM G7 SENSOR) MISC APPLY SENSOR FOR 10 DAYS    Cranberry 200 MG CAPS 2 capsules, Oral, DAILY    desvenlafaxine succinate (PRISTIQ) 25 mg, Oral, DAILY, TDD = 75 mg    desvenlafaxine succinate (PRISTIQ) 50 mg, Oral, DAILY, TDD = 75 mg    empagliflozin (JARDIANCE) 25 mg, Oral, DAILY    fluticasone (FLONASE) 50 MCG/ACT nasal spray 2 sprays by Nasal route daily    Handicap Placard MISC Does not apply, Expires 5 years after issue    HumaLOG 20 Units, SubCUTAneous, 3 TIMES DAILY BEFORE MEALS    HYDROcodone-acetaminophen (NORCO) 5-325 MG per tablet 1 tablet, Oral, EVERY 4-6

## 2025-04-07 NOTE — PLAN OF CARE
Problem: Discharge Planning  Goal: Discharge to home or other facility with appropriate resources  Note: Patient is from assisted living, and hopes to be able to return there when she leaves the hospital.      Problem: Safety - Adult  Goal: Free from fall injury  Note: She is a fall risk. Door open, and bed alarm on. Call light in reach.      Problem: Chronic Conditions and Co-morbidities  Goal: Patient's chronic conditions and co-morbidity symptoms are monitored and maintained or improved  Note: Orthopedics consulted. MRI ordered of the hip.      Problem: Pain  Goal: Verbalizes/displays adequate comfort level or baseline comfort level  Note: Patient complains of pain in left hip, groin, and going down her leg. Medicated with dilaudid and oxycodone. Patient currently resting peacefully.     Problem: Skin/Tissue Integrity  Goal: Skin integrity remains intact  Description: 1.  Monitor for areas of redness and/or skin breakdown  2.  Assess vascular access sites hourly  3.  Every 4-6 hours minimum:  Change oxygen saturation probe site  4.  Every 4-6 hours:  If on nasal continuous positive airway pressure, respiratory therapy assess nares and determine need for appliance change or resting period  Note: No skin issues identified.

## 2025-04-08 PROCEDURE — 2500000003 HC RX 250 WO HCPCS: Performed by: STUDENT IN AN ORGANIZED HEALTH CARE EDUCATION/TRAINING PROGRAM

## 2025-04-08 PROCEDURE — 6360000002 HC RX W HCPCS: Performed by: ORTHOPAEDIC SURGERY

## 2025-04-08 PROCEDURE — 97530 THERAPEUTIC ACTIVITIES: CPT

## 2025-04-08 PROCEDURE — 97161 PT EVAL LOW COMPLEX 20 MIN: CPT

## 2025-04-08 PROCEDURE — 97535 SELF CARE MNGMENT TRAINING: CPT

## 2025-04-08 PROCEDURE — 2500000003 HC RX 250 WO HCPCS: Performed by: ORTHOPAEDIC SURGERY

## 2025-04-08 PROCEDURE — G0378 HOSPITAL OBSERVATION PER HR: HCPCS

## 2025-04-08 PROCEDURE — 96375 TX/PRO/DX INJ NEW DRUG ADDON: CPT

## 2025-04-08 PROCEDURE — 6370000000 HC RX 637 (ALT 250 FOR IP): Performed by: STUDENT IN AN ORGANIZED HEALTH CARE EDUCATION/TRAINING PROGRAM

## 2025-04-08 PROCEDURE — 97116 GAIT TRAINING THERAPY: CPT

## 2025-04-08 PROCEDURE — 96376 TX/PRO/DX INJ SAME DRUG ADON: CPT

## 2025-04-08 PROCEDURE — 6370000000 HC RX 637 (ALT 250 FOR IP)

## 2025-04-08 PROCEDURE — 97166 OT EVAL MOD COMPLEX 45 MIN: CPT

## 2025-04-08 RX ORDER — POLYVINYL ALCOHOL 14 MG/ML
1 SOLUTION/ DROPS OPHTHALMIC PRN
Status: DISCONTINUED | OUTPATIENT
Start: 2025-04-08 | End: 2025-04-09 | Stop reason: HOSPADM

## 2025-04-08 RX ORDER — PREDNISONE 20 MG/1
40 TABLET ORAL DAILY
Qty: 20 TABLET | Refills: 0 | Status: SHIPPED | OUTPATIENT
Start: 2025-04-08 | End: 2025-04-18

## 2025-04-08 RX ORDER — PREDNISONE 20 MG/1
20 TABLET ORAL 2 TIMES DAILY
Qty: 10 TABLET | Refills: 0 | Status: SHIPPED | OUTPATIENT
Start: 2025-04-08 | End: 2025-04-08

## 2025-04-08 RX ORDER — PREDNISONE 20 MG/1
40 TABLET ORAL DAILY
Qty: 10 TABLET | Refills: 0 | Status: SHIPPED | OUTPATIENT
Start: 2025-04-08 | End: 2025-04-08 | Stop reason: HOSPADM

## 2025-04-08 RX ADMIN — BUSPIRONE HYDROCHLORIDE 5 MG: 5 TABLET ORAL at 11:08

## 2025-04-08 RX ADMIN — EMPAGLIFLOZIN 10 MG: 10 TABLET, FILM COATED ORAL at 11:08

## 2025-04-08 RX ADMIN — ATORVASTATIN CALCIUM 20 MG: 20 TABLET, FILM COATED ORAL at 11:08

## 2025-04-08 RX ADMIN — TROSPIUM CHLORIDE 20 MG: 20 TABLET, FILM COATED ORAL at 21:18

## 2025-04-08 RX ADMIN — WATER 40 MG: 1 INJECTION INTRAMUSCULAR; INTRAVENOUS; SUBCUTANEOUS at 16:57

## 2025-04-08 RX ADMIN — POLYVINYL ALCOHOL 1 DROP: 1.4 SOLUTION/ DROPS OPHTHALMIC at 06:15

## 2025-04-08 RX ADMIN — BUSPIRONE HYDROCHLORIDE 5 MG: 5 TABLET ORAL at 14:56

## 2025-04-08 RX ADMIN — BUSPIRONE HYDROCHLORIDE 5 MG: 5 TABLET ORAL at 21:18

## 2025-04-08 RX ADMIN — ASPIRIN 81 MG: 81 TABLET, CHEWABLE ORAL at 11:08

## 2025-04-08 RX ADMIN — ACETAMINOPHEN 650 MG: 325 TABLET, FILM COATED ORAL at 11:08

## 2025-04-08 RX ADMIN — HYDROMORPHONE HYDROCHLORIDE 0.5 MG: 1 INJECTION, SOLUTION INTRAMUSCULAR; INTRAVENOUS; SUBCUTANEOUS at 01:08

## 2025-04-08 RX ADMIN — OXYCODONE 5 MG: 5 TABLET ORAL at 00:32

## 2025-04-08 RX ADMIN — METOPROLOL SUCCINATE 50 MG: 50 TABLET, EXTENDED RELEASE ORAL at 11:08

## 2025-04-08 RX ADMIN — SODIUM CHLORIDE, PRESERVATIVE FREE 10 ML: 5 INJECTION INTRAVENOUS at 21:20

## 2025-04-08 RX ADMIN — WATER 40 MG: 1 INJECTION INTRAMUSCULAR; INTRAVENOUS; SUBCUTANEOUS at 11:37

## 2025-04-08 RX ADMIN — SACUBITRIL AND VALSARTAN 1 TABLET: 24; 26 TABLET, FILM COATED ORAL at 21:18

## 2025-04-08 RX ADMIN — QUETIAPINE FUMARATE 100 MG: 100 TABLET ORAL at 21:18

## 2025-04-08 RX ADMIN — OXYCODONE 5 MG: 5 TABLET ORAL at 21:18

## 2025-04-08 RX ADMIN — SODIUM CHLORIDE, PRESERVATIVE FREE 10 ML: 5 INJECTION INTRAVENOUS at 11:40

## 2025-04-08 RX ADMIN — OXYCODONE 5 MG: 5 TABLET ORAL at 06:15

## 2025-04-08 ASSESSMENT — PAIN DESCRIPTION - DESCRIPTORS: DESCRIPTORS: DISCOMFORT

## 2025-04-08 ASSESSMENT — PAIN - FUNCTIONAL ASSESSMENT: PAIN_FUNCTIONAL_ASSESSMENT: PREVENTS OR INTERFERES SOME ACTIVE ACTIVITIES AND ADLS

## 2025-04-08 ASSESSMENT — PAIN SCALES - GENERAL
PAINLEVEL_OUTOF10: 10
PAINLEVEL_OUTOF10: 3
PAINLEVEL_OUTOF10: 7
PAINLEVEL_OUTOF10: 10
PAINLEVEL_OUTOF10: 4
PAINLEVEL_OUTOF10: 7
PAINLEVEL_OUTOF10: 3
PAINLEVEL_OUTOF10: 5

## 2025-04-08 ASSESSMENT — PAIN DESCRIPTION - ORIENTATION
ORIENTATION: LEFT

## 2025-04-08 ASSESSMENT — PAIN DESCRIPTION - LOCATION
LOCATION: LEG;GROIN;HIP
LOCATION: HIP
LOCATION: LEG;GROIN;HIP
LOCATION: HIP
LOCATION: HIP

## 2025-04-08 ASSESSMENT — PAIN DESCRIPTION - ONSET: ONSET: ON-GOING

## 2025-04-08 ASSESSMENT — PAIN DESCRIPTION - FREQUENCY: FREQUENCY: CONTINUOUS

## 2025-04-08 ASSESSMENT — PAIN DESCRIPTION - PAIN TYPE: TYPE: ACUTE PAIN

## 2025-04-08 NOTE — H&P
valACYclovir  500 mg Oral Every Other Day    trospium  20 mg Oral Nightly    sodium chloride flush  5-40 mL IntraVENous 2 times per day      Infusions:    sodium chloride       PRN Meds: methyl salicylate-menthol, , TID PRN  meclizine, 12.5 mg, TID PRN  melatonin, 5 mg, Nightly PRN  sodium chloride flush, 5-40 mL, PRN  sodium chloride, , PRN  ondansetron, 4 mg, Q8H PRN   Or  ondansetron, 4 mg, Q6H PRN  polyethylene glycol, 17 g, Daily PRN  acetaminophen, 650 mg, Q6H PRN   Or  acetaminophen, 650 mg, Q6H PRN  HYDROmorphone, 0.5 mg, Q4H PRN  oxyCODONE, 5 mg, Q4H PRN        Labs      CBC:   Recent Labs     04/07/25  0547   WBC 6.3   HGB 11.1*        BMP:    Recent Labs     04/06/25  1344 04/07/25  1202    137   K 5.2* 5.5*  5.5*    102   CO2 22 21   BUN 34* 29*   CREATININE 1.8* 1.5*   GLUCOSE 134* 144*     Hepatic:   Recent Labs     04/07/25  1202   AST 13*   ALT 9*   BILITOT <0.2   ALKPHOS 107     Lipids:   Lab Results   Component Value Date/Time    CHOL 120 09/03/2024 12:00 AM    HDL 36 09/03/2024 12:00 AM    TRIG 190 09/03/2024 12:00 AM     Hemoglobin A1C:   Lab Results   Component Value Date/Time    LABA1C 10.5 03/18/2025 02:11 PM     TSH: No results found for: \"TSH\"  Troponin: No results found for: \"TROPONINT\"  Lactic Acid: No results for input(s): \"LACTA\" in the last 72 hours.  BNP: No results for input(s): \"PROBNP\" in the last 72 hours.  UA:  Lab Results   Component Value Date/Time    NITRU Negative 05/25/2024 04:25 PM    COLORU Yellow 05/25/2024 04:25 PM    PHUR 5.5 05/25/2024 04:25 PM    PHUR 6.0 08/07/2023 12:43 PM    WBCUA 6-9 05/25/2024 04:25 PM    RBCUA 0-2 05/25/2024 04:25 PM    MUCUS 2+ 05/25/2024 04:25 PM    BACTERIA 2+ 05/25/2024 04:25 PM    CLARITYU Clear 05/25/2024 04:25 PM    LEUKOCYTESUR TRACE 05/25/2024 04:25 PM    UROBILINOGEN 0.2 05/25/2024 04:25 PM    BILIRUBINUR Negative 05/25/2024 04:25 PM    BLOODU TRACE-LYSED 05/25/2024 04:25 PM    GLUCOSEU 100 05/25/2024 04:25 PM

## 2025-04-08 NOTE — PLAN OF CARE
Problem: Discharge Planning  Goal: Discharge to home or other facility with appropriate resources  Note: Patient has a discharge order. The patient cannot return until director comes here to see patient. Awaiting approval to return.      Problem: Safety - Adult  Goal: Free from fall injury  Note: She is a fall risk. Door open and bed alarm on. Call light in reach.      Problem: Chronic Conditions and Co-morbidities  Goal: Patient's chronic conditions and co-morbidity symptoms are monitored and maintained or improved  Note: Right eye with drainage and redness. Moisturizing drops ordered. Warm compress applied.     Problem: Pain  Goal: Verbalizes/displays adequate comfort level or baseline comfort level  Note: Patient continues to complain of left groin, hip, and leg pain. Medicated with tylenol this shift. BP in the 90's and patient very drowsy. Pain improved with tylenol.      Problem: Skin/Tissue Integrity  Goal: Skin integrity remains intact  Description: 1.  Monitor for areas of redness and/or skin breakdown  2.  Assess vascular access sites hourly  3.  Every 4-6 hours minimum:  Change oxygen saturation probe site  4.  Every 4-6 hours:  If on nasal continuous positive airway pressure, respiratory therapy assess nares and determine need for appliance change or resting period  Note: No skin issues.

## 2025-04-08 NOTE — DISCHARGE INSTR - COC
Continuity of Care Form    Patient Name: Shelley Maurer   :  1945  MRN:  [id number]    Admit date:  2025  Discharge date:  ***    Code Status Order: Full Code   Advance Directives:     Admitting Physician:  Chas Rodriguez MD  PCP: Clary Friedman, APRN - CNP    Discharging Nurse: ***  Discharging Hospital Unit/Room#: 6313/6313-01  Discharging Unit Phone Number: ***    Emergency Contact:   Extended Emergency Contact Information  Primary Emergency Contact: Horacio Gonzales  Home Phone: [phone]  Mobile Phone: [phone]  Relation: Child  Secondary Emergency Contact: Dwayne Abbott  Home Phone: [phone]  Mobile Phone: [phone]  Relation: Child    Past Surgical History:  Past Surgical History:   Procedure Laterality Date    BACK SURGERY      cervical    BLADDER SUSPENSION      X 3    CHOLECYSTECTOMY      COLONOSCOPY  5-3-16    normal    HYSTERECTOMY (CERVIX STATUS UNKNOWN)      NOSE SURGERY      fracture    TOE SURGERY      UPPER GASTROINTESTINAL ENDOSCOPY  13    UPPER GASTROINTESTINAL ENDOSCOPY  5-3-16    biopsy esophagus + dilatation    WRIST SURGERY         Immunization History:   Immunization History   Administered Date(s) Administered    COVID-19, J&J, (age 18y+), IM, 0.5 mL 2021, 2021    COVID-19, PFIZER PURPLE top, DILUTE for use, (age 12 y+), 30mcg/0.3mL 2021, 2021    Influenza Virus Vaccine 2014    Influenza, FLUZONE High Dose, (age 65 y+), IM, Trivalent PF, 0.5mL 2015, 2017, 09/10/2018, 2019, 10/22/2020, 2022    Pneumococcal, PCV-13, PREVNAR 13, (age 6w+), IM, 0.5mL 2015    Zoster Recombinant (Shingrix) 2020, 02/15/2021       Active Problems:  Patient Active Problem List   Diagnosis Code    Pneumonia J18.9    Controlled type 2 diabetes mellitus with diabetic neuropathy, without long-term current use of insulin (HCC) E11.40    Hypertension I10    Mixed hyperlipidemia E78.2    Polymyalgia rheumatica M35.3

## 2025-04-09 VITALS
BODY MASS INDEX: 23.74 KG/M2 | OXYGEN SATURATION: 93 % | HEIGHT: 67 IN | WEIGHT: 151.24 LBS | SYSTOLIC BLOOD PRESSURE: 129 MMHG | RESPIRATION RATE: 18 BRPM | TEMPERATURE: 98.1 F | HEART RATE: 105 BPM | DIASTOLIC BLOOD PRESSURE: 76 MMHG

## 2025-04-09 PROBLEM — N17.9 AKI (ACUTE KIDNEY INJURY): Status: ACTIVE | Noted: 2025-04-09

## 2025-04-09 LAB
ANION GAP SERPL CALCULATED.3IONS-SCNC: 14 MMOL/L (ref 3–16)
BUN SERPL-MCNC: 50 MG/DL (ref 7–20)
CALCIUM SERPL-MCNC: 9.6 MG/DL (ref 8.3–10.6)
CHLORIDE SERPL-SCNC: 97 MMOL/L (ref 99–110)
CO2 SERPL-SCNC: 24 MMOL/L (ref 21–32)
CREAT SERPL-MCNC: 1.8 MG/DL (ref 0.6–1.2)
GFR SERPLBLD CREATININE-BSD FMLA CKD-EPI: 28 ML/MIN/{1.73_M2}
GLUCOSE SERPL-MCNC: 291 MG/DL (ref 70–99)
POTASSIUM SERPL-SCNC: 5.1 MMOL/L (ref 3.5–5.1)
SODIUM SERPL-SCNC: 135 MMOL/L (ref 136–145)

## 2025-04-09 PROCEDURE — 6370000000 HC RX 637 (ALT 250 FOR IP): Performed by: SURGERY

## 2025-04-09 PROCEDURE — 2500000003 HC RX 250 WO HCPCS: Performed by: ORTHOPAEDIC SURGERY

## 2025-04-09 PROCEDURE — 80048 BASIC METABOLIC PNL TOTAL CA: CPT

## 2025-04-09 PROCEDURE — 1200000000 HC SEMI PRIVATE

## 2025-04-09 PROCEDURE — 36415 COLL VENOUS BLD VENIPUNCTURE: CPT

## 2025-04-09 PROCEDURE — 96376 TX/PRO/DX INJ SAME DRUG ADON: CPT

## 2025-04-09 PROCEDURE — 6370000000 HC RX 637 (ALT 250 FOR IP): Performed by: STUDENT IN AN ORGANIZED HEALTH CARE EDUCATION/TRAINING PROGRAM

## 2025-04-09 PROCEDURE — 6360000002 HC RX W HCPCS: Performed by: ORTHOPAEDIC SURGERY

## 2025-04-09 RX ORDER — CIPROFLOXACIN HYDROCHLORIDE 3.5 MG/ML
2 SOLUTION/ DROPS TOPICAL
Status: DISCONTINUED | OUTPATIENT
Start: 2025-04-09 | End: 2025-04-09 | Stop reason: HOSPADM

## 2025-04-09 RX ADMIN — VALACYCLOVIR HYDROCHLORIDE 500 MG: 500 TABLET, FILM COATED ORAL at 10:14

## 2025-04-09 RX ADMIN — WATER 40 MG: 1 INJECTION INTRAMUSCULAR; INTRAVENOUS; SUBCUTANEOUS at 10:08

## 2025-04-09 RX ADMIN — POLYVINYL ALCOHOL 1 DROP: 1.4 SOLUTION/ DROPS OPHTHALMIC at 10:06

## 2025-04-09 RX ADMIN — OXYCODONE 5 MG: 5 TABLET ORAL at 11:15

## 2025-04-09 RX ADMIN — ASPIRIN 81 MG: 81 TABLET, CHEWABLE ORAL at 10:05

## 2025-04-09 RX ADMIN — METOPROLOL SUCCINATE 50 MG: 50 TABLET, EXTENDED RELEASE ORAL at 10:06

## 2025-04-09 RX ADMIN — SACUBITRIL AND VALSARTAN 1 TABLET: 24; 26 TABLET, FILM COATED ORAL at 10:05

## 2025-04-09 RX ADMIN — WATER 40 MG: 1 INJECTION INTRAMUSCULAR; INTRAVENOUS; SUBCUTANEOUS at 01:29

## 2025-04-09 RX ADMIN — SODIUM ZIRCONIUM CYCLOSILICATE 10 G: 10 POWDER, FOR SUSPENSION ORAL at 11:17

## 2025-04-09 RX ADMIN — EMPAGLIFLOZIN 10 MG: 10 TABLET, FILM COATED ORAL at 10:05

## 2025-04-09 RX ADMIN — OXYCODONE 5 MG: 5 TABLET ORAL at 07:18

## 2025-04-09 RX ADMIN — ATORVASTATIN CALCIUM 20 MG: 20 TABLET, FILM COATED ORAL at 10:05

## 2025-04-09 RX ADMIN — CIPROFLOXACIN 2 DROP: 3 SOLUTION OPHTHALMIC at 11:15

## 2025-04-09 RX ADMIN — BUSPIRONE HYDROCHLORIDE 5 MG: 5 TABLET ORAL at 10:05

## 2025-04-09 ASSESSMENT — PAIN DESCRIPTION - LOCATION: LOCATION: HIP

## 2025-04-09 ASSESSMENT — PAIN DESCRIPTION - DESCRIPTORS: DESCRIPTORS: DISCOMFORT

## 2025-04-09 ASSESSMENT — PAIN SCALES - GENERAL
PAINLEVEL_OUTOF10: 7
PAINLEVEL_OUTOF10: 6

## 2025-04-09 ASSESSMENT — PAIN DESCRIPTION - ONSET: ONSET: ON-GOING

## 2025-04-09 ASSESSMENT — PAIN DESCRIPTION - ORIENTATION: ORIENTATION: LEFT

## 2025-04-09 ASSESSMENT — PAIN DESCRIPTION - FREQUENCY: FREQUENCY: CONTINUOUS

## 2025-04-09 ASSESSMENT — PAIN - FUNCTIONAL ASSESSMENT: PAIN_FUNCTIONAL_ASSESSMENT: PREVENTS OR INTERFERES SOME ACTIVE ACTIVITIES AND ADLS

## 2025-04-09 ASSESSMENT — PAIN DESCRIPTION - PAIN TYPE: TYPE: ACUTE PAIN

## 2025-04-09 NOTE — CARE COORDINATION
Case Management Assessment            Discharge Note                    Date / Time of Note: 4/9/2025 10:50 AM                  Discharge Note Completed by: Sybil Boogie RN    Patient Name: Shelley Maurer   YOB: 1945  Diagnosis: Left hip pain [M25.552]  Left groin pain [R10.32]  Intractable pain [R52]  ROSIE (acute kidney injury) [N17.9]   Date / Time: 4/6/2025  7:03 AM    Current PCP: Clary Friedman APRN - CNP  Clinic patient: No    Hospitalization in the last 30 days: No       Advance Directives:  Code Status: Full Code  [place] DNR form completed and on chart: Not Indicated    Financial:  Payor: [facility] MEDICARE / Plan: [facility] MEDICARE / Product Type: *No Product type* /      Pharmacy:    [facility] [id number] - [place], [place] - [address] - P [phone] - F [phone]  [address] [facility] [address]  Phone: [phone] Fax: [phone]    [id number] - [place], [place] - [address] - P [phone] - F [phone]  [address] [address]  Phone: [phone] Fax: [phone]    [place], [place] - [address] [phone] - F [phone]  [address] [address]  Phone: [phone] Fax: [phone]    [id number] - [place], [place] - [address] - P [phone] - F [phone] [address]  [facility] [address]  Phone: [phone] Fax: [phone]      Assistance purchasing medications?: Potential Assistance Purchasing Medications: No  Assistance provided by Case Management: None at this time    Does patient want to participate in local refill/ meds to beds program?: No    Meds To Beds General Rules:  1. Can ONLY be done Monday- Friday between 8:30am-5pm  2. Prescription(s) must be in pharmacy by 3pm to be filled same day  3.Copy of patient's insurance/ prescription drug card and patient face sheet must be sent along with the prescription(s)  4. 
CM  following for  d/c planning:     Patient  from  [facility]  .     Patient here with pain ,  NS   , no intervention at this time.     PTOT pending  awaiting  Ortho  consult  before  evaluating:    Patient seen byortho this AM   EVal noted    PLAN:  I  -  MD  discussed with the patient the left hip MRI and the treatment options including both surgical and non-surgical treatment.   -  Ortho  recommended Quad exercises and stretching of the calf and hamstrings which was taught to the patient today.   -   Ortho believes her pain likely sciatica pian and that she may benefit from Solumedrol IV, and was ordered. And they will cont to follow .     CM  awaiting  PTOT eval /rec:  to assist  with  d/c planning:    Electronically signed by Emily Hogan RN on 4/8/2025 at 9:05 AM      Emily Hogan RN Case Manager  [facility] [place] [address]  [facility] [id number] [phone]  Fax [phone]       
    After  further  review  , SANDY spoke with  Samantha  who  confirmed she can accept the  pt  back  with  [facility] services and requested referral to  [facility]  SN PTOT and  SLP  , and requested  transportation between  11-12  pm     If pt returns to [place], recommend hands-on assistance for all mobility & home PT. Will follow while here to maximize independence. Samantha  acknowledged .    SANDY arranged  EMS transport  at  11:30  am  and faxed referral to  [facility] per request.        RN to call REPORT  to  [phone]  And send  AVS  TERESA  w/  patient  : and   -  FAX: to  [phone].    [facility]    Services Available   Home Health Services      Address   [address]  [facility] [address]             Contact Information    [phone]            The Plan for Transition of Care is related to the following treatment goals of Left hip pain [M25.552]  Left groin pain [R10.32]  Intractable pain [R52]    IF APPLICABLE: The Patient and/or patient representative Shelley and her family were provided with a choice of provider and agrees with the discharge plan. Freedom of choice list with basic dialogue that supports the patient's individualized plan of care/goals and shares the quality data associated with the providers was provided to: Patient   Patient Representative Name:       The Patient and/or Patient Representative Agree with the Discharge Plan? Yes    Emily Hogan RN  Case Management Department  Ph: [phone]

## 2025-04-09 NOTE — PLAN OF CARE
Problem: Safety - Adult  Goal: Free from fall injury  Outcome: Progressing  Note:  Remains free from falls, bed in low position, call light in reach, bed alarm monitoring for safety.     Problem: Pain  Goal: Verbalizes/displays adequate comfort level or baseline comfort level  Outcome: Progressing  Note:  PRN Oxycodone 5 mg PO at 2118 and 0718 for c/o left hip pain.     Problem: Respiratory - Adult  Goal: Achieves optimal ventilation and oxygenation  Outcome: Progressing  Note:  O2 94 to 95% on 2L per NC.     Problem: Cardiovascular - Adult  Goal: Absence of cardiac dysrhythmias or at baseline  Outcome: Progressing  Note:  Vital signs stable.

## 2025-04-09 NOTE — PROGRESS NOTES
Pharmacy Note - Renal dose adjustment made per P/T protocol    Original order:  Trospium 20mg BID    Estimated Creatinine Clearance: 29 mL/min (A) (based on SCr of 1.5 mg/dL (H)).    Recent Labs     04/06/25  1344 04/07/25  1202   BUN 34* 29*   CREATININE 1.8* 1.5*       Renally adjusted order:  Trospium 20mg Nightly    Please call pharmacy with any questions.    Thank you,  Reed Fang [facility]  4/7/2025 3:51 PM  
D/C order noted. SL removed. Pt and son verbalized understanding of medication resumption and stated that the [facility] nurses will handle resumption of meds. Transported off unit per wheelchair with all personal belongings.   
Occupational Therapy  OT order received, chart reviewed, noted orthopedic consult completed and he ordered MRI of L hip to further evaluate AVN vs stress fracture.  The MRI is still pending.  Will await results/plan prior to doing OT evaluation.  Will follow up 4/8/25.  Mae Ortiz, OTR/L [id number]  
Physical Therapy  Shelley Maurer    PT orders received, chart reviewed.  Patient pending ortho consult.  PT to continue to follow for evaluation pending ortho recommendations.  Plan to continue to follow.    Updated 13:41: Per ortho, MRI ordered of left hip to further evaluate for AVN vs stress fracture.  Plan to hold PT evaluation pending results of MRI.  Plan to continue to follow.    Erlinda Chaves PT, DPT [id number]      
Regular rate.  Respiratory: Clear to auscultation  Gastrointestinal: Soft, non tender  Genitourinary: no suprapubic tenderness  Musculoskeletal: No edema  Skin: warm, dry  Neuro: Alert.  Psych: Mood appropriate.     /76   Pulse (!) 105   Temp 98.1 °F (36.7 °C) (Axillary)   Resp 18   Ht 1.702 m (5' 7\")   Wt 68.6 kg (151 lb 3.8 oz)   SpO2 93%   BMI 23.69 kg/m²     Diet: ADULT DIET; Regular; 4 carb choices (60 gm/meal); Low Fat/Low Chol/High Fiber/MARLEY; No Added Salt (3-4 gm)  DVT Prophylaxis: []PPx LMWH  []SQ Heparin  []IPC/SCDs  []Eliquis  []Xarelto  []Coumadin  []Other -      Code status: Full Code  PT/OT Eval Status:   []NOT yet ordered  []Ordered and Pending   []Seen with Recommendations for:  []Home independently  []Home w/ assist  []HHC  []SNF  []Acute Rehab    Anticipated Discharge Day/Date:  discharged    Anticipated Discharge Location: []Home  []HHC  []SNF  []Acute Rehab  []ECF  []LTAC  []Hospice  []Other -      Consults:      IP CONSULT TO PHARMACY  IP CONSULT TO ORTHOPEDIC SURGERY  IP CONSULT TO HOME CARE NEEDS      This patient has a high likelihood of being discharged tomorrow and is appropriate for A1 Discharge Unit in AM pending clinical course overnight: []Yes  []No    ------------------------------------------------------------------------------------------------------------------------------------------------------------------------    MDM - level 2  [] High (any 2):    KAREN Problems (any 1):  [] Acute/Chronic Illness/injury posing threat to life or bodily function:    [] Severe exacerbation of chronic illness:    ---------------------------------------------------------------------  B. Risk of Treatment (any 1):   [] Drugs/treatments that requiring intensive monitoring for toxicity:    [] IV ABX requiring serial renal monitoring for nephrotoxicity:     [] IV Narcotic analgesia for adverse drug reaction  [] IV diuresis requiring serial monitoring for renal impairment and electrolyte 
lumbar spine indicative of desiccation and degeneration. Vertebral body heights are preserved. Bone marrow signal unremarkable. The conus medullaris is in normal location. T12-L1: L1-L2: Minimal central disc bulge. No focal disc protrusion. No spinal canal stenosis L2-L3: Normal posterior disc margin. No spinal canal or foraminal stenosis. L3-L4: Normal posterior disc margin. No spinal canal or foraminal stenosis. L4-L5: Mild bulging of the disc. Capacious spinal canal no stenosis. Mild facet arthropathy L5-S1: Mild bulging of the disc. No focal disc protrusion. No spinal canal stenosis. Mild bilateral facet arthropathy     Mild lumbar spondylosis. No spinal canal stenosis. Electronically signed by Mary Chan    CT HIP LEFT WO CONTRAST  Result Date: 4/6/2025  EXAM: CT HIP LEFT WO CONTRAST. DATE: 4/6/2025 7:42 EDT. INDICATION: left hip pain, left inguinal pain. COMPARISON: None TECHNIQUE: Axial CT imaging obtained of the left hip. Axial images and multiplanar reformatted images are provided for review. Up-to-date CT equipment and radiation dose reduction techniques were employed. CONTRAST: No intravenous FINDINGS: Chronic right pubic bone fracture at the pubic symphysis. No acute fracture or dislocation. Mild left hip osteoarthrosis. Visualized muscular structures normal in both intensity. Subcutaneous soft tissues unremarkable. Moderate colonic diverticulosis. Prior hysterectomy.     No acute osseous abnormality. Mild left hip osteoarthrosis. Chronic right pubic bone fracture. Electronically signed by Jake Jaimes MD      CBC:   Recent Labs     04/07/25  0547   WBC 6.3   HGB 11.1*        BMP:    Recent Labs     04/06/25  1344 04/07/25  1202    137   K 5.2* 5.5*  5.5*    102   CO2 22 21   BUN 34* 29*   CREATININE 1.8* 1.5*   GLUCOSE 134* 144*     Hepatic:   Recent Labs     04/07/25  1202   AST 13*   ALT 9*   BILITOT <0.2   ALKPHOS 107     Lipids:   Lab Results   Component Value 
Goal 3: Amb 50 ft with RW SBA  Patient Goals   Patient Goals : to go home       Education  Patient Education  Education Given To: Patient  Education Provided: Role of Therapy;Plan of Care;Transfer Training  Education Method: Verbal  Education Outcome: Verbalized understanding;Continued education needed      Therapy Time   Individual Concurrent Group Co-treatment   Time In 1057         Time Out 1150         Minutes 53                 Nikole Robles, PT         
needed for putting on and taking off regular lower body clothing?: A Lot  How much help is needed for bathing (which includes washing, rinsing, drying)?: A Lot  How much help is needed for toileting (which includes using toilet, bedpan, or urinal)?: A Lot  How much help is needed for putting on and taking off regular upper body clothing?: A Little  How much help is needed for taking care of personal grooming?: A Little  How much help for eating meals?: None  AM-[facility] Inpatient Daily Activity Raw Score: 16  AM-PAC Inpatient ADL T-Scale Score : 35.96  ADL Inpatient CMS 0-100% Score: 53.32  ADL Inpatient CMS G-Code Modifier : CK    Goals  No goals met    Short Term Goals  Time Frame for Short Term Goals: discharge  Short Term Goal 1: Pt will complete LB dressing with min A using AE as needed  Short Term Goal 2: Pt will complete toileting with CGA  Short Term Goal 3: Pt will complete 2 grooming ADLs in stance with SBA  Patient Goals   Patient goals : to go home      Therapy Time   Individual Concurrent Group Co-treatment   Time In 1057         Time Out 1150         Minutes 53         Timed Code Treatment Minutes: 38 Minutes (15 minute OT eval)     Total Treatment Time: 53 Minutes    Stacie Monsivais OTR/L [id number]      
05:47 AM    MCHC 32.4 04/07/2025 05:47 AM    RDW 18.0 04/07/2025 05:47 AM     04/07/2025 05:47 AM    MPV 8.1 04/07/2025 05:47 AM     WBC:    Lab Results   Component Value Date/Time    WBC 6.3 04/07/2025 05:47 AM     PT/INR:  No results found for: \"PROTIME\", \"INR\"  PTT:  No results found for: \"APTT\"[APTT    IMAGING: CT scan left hip dated 4/6/2025 was reviewed and showed;    No acute osseous abnormality. Mild left hip osteoarthrosis.     Chronic right pubic bone fracture.       MRI left hip dated 4/7/2025 was reviewed and showed;    1.  Normal MRI of the hip. Age-appropriate mild superior joint space loss and minimal acetabular subchondral sclerosis and spur. No sign of any labral deformity joint effusion, AVN or stress fracture.  2.  No significant bursitis or myositis appreciated very minimal high signal noted along the gluteus medius tendon insertion in both hips.    IMPRESSION: Left hip pain/ mild osteoarthritis, likely sciatica pain.      PLAN:  I discussed with the patient the left hip MRI and the treatment options including both surgical and non-surgical treatment.  We recommended Quad exercises and stretching of the calf and hamstrings which was taught to the patient today.  I believe her pain likely sciatica pian and that she may benefit from Solumedrol IV, and was ordered.   We will follow.        Tyler Smith MD   4/8/2025  6:49 AM        
FINDINGS: Mild disc space narrowing L5-S1. Some loss of signal in T2 from the nucleus pulposus throughout the lumbar spine indicative of desiccation and degeneration. Vertebral body heights are preserved. Bone marrow signal unremarkable. The conus medullaris is in normal location. T12-L1: L1-L2: Minimal central disc bulge. No focal disc protrusion. No spinal canal stenosis L2-L3: Normal posterior disc margin. No spinal canal or foraminal stenosis. L3-L4: Normal posterior disc margin. No spinal canal or foraminal stenosis. L4-L5: Mild bulging of the disc. Capacious spinal canal no stenosis. Mild facet arthropathy L5-S1: Mild bulging of the disc. No focal disc protrusion. No spinal canal stenosis. Mild bilateral facet arthropathy     Mild lumbar spondylosis. No spinal canal stenosis. Electronically signed by Mary Chan    CT HIP LEFT WO CONTRAST  Result Date: 4/6/2025  EXAM: CT HIP LEFT WO CONTRAST. DATE: 4/6/2025 7:42 EDT. INDICATION: left hip pain, left inguinal pain. COMPARISON: None TECHNIQUE: Axial CT imaging obtained of the left hip. Axial images and multiplanar reformatted images are provided for review. Up-to-date CT equipment and radiation dose reduction techniques were employed. CONTRAST: No intravenous FINDINGS: Chronic right pubic bone fracture at the pubic symphysis. No acute fracture or dislocation. Mild left hip osteoarthrosis. Visualized muscular structures normal in both intensity. Subcutaneous soft tissues unremarkable. Moderate colonic diverticulosis. Prior hysterectomy.     No acute osseous abnormality. Mild left hip osteoarthrosis. Chronic right pubic bone fracture. Electronically signed by Jake Jaimes MD        Electronically signed by Chas Rodriguez MD on 4/6/2025 at 10:16 PM

## 2025-04-10 ENCOUNTER — TELEPHONE (OUTPATIENT)
Dept: FAMILY MEDICINE CLINIC | Age: 80
End: 2025-04-10

## 2025-04-10 NOTE — TELEPHONE ENCOUNTER
Care Transitions Initial Follow Up Call    Outreach made within 2 business days of discharge: Yes    Patient: Shelley Maurer Patient : 1945   MRN: [id number]  Reason for Admission: intractable pain  Discharge Date: 25       Spoke with: appt already scheduled for . Changed to hospital follow up.      Discharge department/facility: [facility]    TCM Interactive Patient Contact:  Was patient able to fill all prescriptions:   Was patient instructed to bring all medications to the follow-up visit:   Is patient taking all medications as directed in the discharge summary?   Does patient understand their discharge instructions:   Does patient have questions or concerns that need addressed prior to 7-14 day follow up office visit:     Additional needs identified to be addressed with provider  No needs identified             Scheduled appointment with PCP within 7-14 days    Follow Up  Future Appointments   Date Time Provider Department Center   2025  2:00 PM Clary Friedman, APRN - CNP [facility]   2025  2:45 PM [facility]       Esther Guy MA

## 2025-04-11 ENCOUNTER — TELEPHONE (OUTPATIENT)
Dept: FAMILY MEDICINE CLINIC | Age: 80
End: 2025-04-11

## 2025-04-11 NOTE — TELEPHONE ENCOUNTER
Dar from [facility] has did a assessment on the patient and would like to see her twice a wk for 3 wks and once for a final assessment

## 2025-04-16 ENCOUNTER — TELEPHONE (OUTPATIENT)
Dept: FAMILY MEDICINE CLINIC | Age: 80
End: 2025-04-16

## 2025-04-16 NOTE — TELEPHONE ENCOUNTER
Noelle with [facility] called to see if provider will continue to follow patient and fill orders. She also mentioned that patient's insulin readings have been irregular and may need an adjustment in dosage. Please discuss in her visit on 4/17. Thanks. Noelle can be reached at [phone]

## 2025-04-17 ENCOUNTER — OFFICE VISIT (OUTPATIENT)
Dept: FAMILY MEDICINE CLINIC | Age: 80
End: 2025-04-17

## 2025-04-17 VITALS
OXYGEN SATURATION: 96 % | SYSTOLIC BLOOD PRESSURE: 116 MMHG | WEIGHT: 159.6 LBS | TEMPERATURE: 96.8 F | HEART RATE: 71 BPM | RESPIRATION RATE: 16 BRPM | DIASTOLIC BLOOD PRESSURE: 50 MMHG | BODY MASS INDEX: 25 KG/M2

## 2025-04-17 DIAGNOSIS — I50.20 HFREF (HEART FAILURE WITH REDUCED EJECTION FRACTION) (HCC): ICD-10-CM

## 2025-04-17 DIAGNOSIS — Z09 HOSPITAL DISCHARGE FOLLOW-UP: ICD-10-CM

## 2025-04-17 DIAGNOSIS — Z71.89 ACP (ADVANCE CARE PLANNING): ICD-10-CM

## 2025-04-17 DIAGNOSIS — E11.9 TYPE 2 DIABETES MELLITUS WITHOUT COMPLICATION, WITHOUT LONG-TERM CURRENT USE OF INSULIN: Primary | ICD-10-CM

## 2025-04-17 PROBLEM — Z00.00 WELCOME TO MEDICARE PREVENTIVE VISIT: Status: RESOLVED | Noted: 2025-03-18 | Resolved: 2025-04-17

## 2025-04-17 LAB — HBA1C MFR BLD: 8.6 %

## 2025-04-17 ASSESSMENT — PATIENT HEALTH QUESTIONNAIRE - PHQ9
SUM OF ALL RESPONSES TO PHQ QUESTIONS 1-9: 0
8. MOVING OR SPEAKING SO SLOWLY THAT OTHER PEOPLE COULD HAVE NOTICED. OR THE OPPOSITE, BEING SO FIGETY OR RESTLESS THAT YOU HAVE BEEN MOVING AROUND A LOT MORE THAN USUAL: NOT AT ALL
10. IF YOU CHECKED OFF ANY PROBLEMS, HOW DIFFICULT HAVE THESE PROBLEMS MADE IT FOR YOU TO DO YOUR WORK, TAKE CARE OF THINGS AT HOME, OR GET ALONG WITH OTHER PEOPLE: NOT DIFFICULT AT ALL
7. TROUBLE CONCENTRATING ON THINGS, SUCH AS READING THE NEWSPAPER OR WATCHING TELEVISION: NOT AT ALL
2. FEELING DOWN, DEPRESSED OR HOPELESS: NOT AT ALL
SUM OF ALL RESPONSES TO PHQ QUESTIONS 1-9: 0
5. POOR APPETITE OR OVEREATING: NOT AT ALL
3. TROUBLE FALLING OR STAYING ASLEEP: NOT AT ALL
SUM OF ALL RESPONSES TO PHQ QUESTIONS 1-9: 0
9. THOUGHTS THAT YOU WOULD BE BETTER OFF DEAD, OR OF HURTING YOURSELF: NOT AT ALL
1. LITTLE INTEREST OR PLEASURE IN DOING THINGS: NOT AT ALL
6. FEELING BAD ABOUT YOURSELF - OR THAT YOU ARE A FAILURE OR HAVE LET YOURSELF OR YOUR FAMILY DOWN: NOT AT ALL
SUM OF ALL RESPONSES TO PHQ QUESTIONS 1-9: 0
4. FEELING TIRED OR HAVING LITTLE ENERGY: NOT AT ALL

## 2025-04-17 ASSESSMENT — ENCOUNTER SYMPTOMS
GASTROINTESTINAL NEGATIVE: 1
RESPIRATORY NEGATIVE: 1

## 2025-04-17 NOTE — ASSESSMENT & PLAN NOTE
A1c today shows improvement.  Blood sugars are improving continue current treatment plan with no changes at this time.

## 2025-04-17 NOTE — PROGRESS NOTES
Negative.    Gastrointestinal: Negative.    Genitourinary: Negative.    Musculoskeletal: Negative.    Neurological:  Positive for dizziness.        Occasional dizziness when she stands too quickly   Psychiatric/Behavioral: Negative.           Objective:    BP (!) 116/50   Pulse 71   Temp 96.8 °F (36 °C) (Temporal)   Resp 16   Wt 72.4 kg (159 lb 9.6 oz)   SpO2 96%   BMI 25.00 kg/m²     Physical Exam  Vitals reviewed.   Constitutional:       Appearance: Normal appearance. She is well-developed.   HENT:      Head: Normocephalic and atraumatic.      Right Ear: Hearing normal.      Left Ear: Hearing normal.      Nose: No mucosal edema.      Right Sinus: No maxillary sinus tenderness or frontal sinus tenderness.      Left Sinus: No maxillary sinus tenderness or frontal sinus tenderness.      Mouth/Throat:      Tonsils: No tonsillar abscesses.   Eyes:      Extraocular Movements: Extraocular movements intact.      Pupils: Pupils are equal, round, and reactive to light.   Cardiovascular:      Rate and Rhythm: Normal rate and regular rhythm.      Pulses: Normal pulses.      Heart sounds: Normal heart sounds.   Pulmonary:      Effort: Pulmonary effort is normal.      Breath sounds: Normal breath sounds.   Musculoskeletal:         General: Normal range of motion.      Cervical back: Normal range of motion and neck supple.      Comments: Presents in a wheelchair   Lymphadenopathy:      Head:      Right side of head: No submental, submandibular, tonsillar, preauricular, posterior auricular or occipital adenopathy.      Left side of head: No submental, submandibular, tonsillar, preauricular, posterior auricular or occipital adenopathy.   Skin:     General: Skin is warm and dry.   Neurological:      General: No focal deficit present.      Mental Status: She is alert and oriented to person, place, and time.   Psychiatric:         Mood and Affect: Mood normal.         Behavior: Behavior normal.         An electronic signature

## 2025-04-17 NOTE — ASSESSMENT & PLAN NOTE
She has a history of heart failure and was previously on Entresto, which was not continued after her move to [facility] [place]. Entresto will be restarted to manage her heart failure. She is advised to follow up with Dr. Roberto, a cardiologist closer to her location, for further management.

## 2025-04-17 NOTE — PATIENT INSTRUCTIONS

## 2025-04-21 ENCOUNTER — TELEPHONE (OUTPATIENT)
Dept: FAMILY MEDICINE CLINIC | Age: 80
End: 2025-04-21

## 2025-04-21 NOTE — TELEPHONE ENCOUNTER
Jacki from [facility] by [facility] wanted to advise that the patien blood sugars ar fluxuating. And it sounds like she has fluid in her chest. Would like a order put in for a xray.    [facility] [phone]

## 2025-04-23 NOTE — TELEPHONE ENCOUNTER
Terese advised  Blood sugar 86  yesterday morning, so skipped dose, blood sugar 167 w/ lunch , dose given,  blood sugar 69 at dinner, skipped dose

## 2025-04-25 ENCOUNTER — TELEPHONE (OUTPATIENT)
Dept: FAMILY MEDICINE CLINIC | Age: 80
End: 2025-04-25

## 2025-04-25 NOTE — TELEPHONE ENCOUNTER
Spoke to Noelle who relayed the impression of the X-Ray. X-Ray reads that there is a mild right basilar infiltrate otherwise is normal. Provided Noelle with PCP's phone number to discuss.

## 2025-04-25 NOTE — TELEPHONE ENCOUNTER
Noelle said the patient is more tired an  lathargic. Here rales in right lower lobe. Patient has had weight gain of about 8 pounds in last week. [phone]

## 2025-04-25 NOTE — TELEPHONE ENCOUNTER
Spoke to Bridgette, instructed her to resume insulin at half of previous dose, and to call on Monday to report pt's blood sugars from the weekend.

## 2025-04-25 NOTE — TELEPHONE ENCOUNTER
Flako Shin from [facility] called today to give the following report on the patients blood sugar levels:    46 @ 3:00 pm yesterday  63 after drinking orange juice  156 @ 8:45 am this morning  224 @ 10:43 am this morning (result from dexcom sensor)    Insulin is still being held    Her call back number is [phone], they would like to know if you want them to continue holding insulin.

## 2025-04-26 RX ORDER — ALBUTEROL SULFATE 90 UG/1
2 INHALANT RESPIRATORY (INHALATION) 4 TIMES DAILY PRN
Qty: 54 G | Refills: 1 | Status: SHIPPED | OUTPATIENT
Start: 2025-04-26

## 2025-04-28 ENCOUNTER — OFFICE VISIT (OUTPATIENT)
Dept: FAMILY MEDICINE CLINIC | Age: 80
End: 2025-04-28
Payer: MEDICARE

## 2025-04-28 ENCOUNTER — TELEPHONE (OUTPATIENT)
Dept: FAMILY MEDICINE CLINIC | Age: 80
End: 2025-04-28

## 2025-04-28 VITALS
WEIGHT: 152 LBS | DIASTOLIC BLOOD PRESSURE: 50 MMHG | RESPIRATION RATE: 16 BRPM | SYSTOLIC BLOOD PRESSURE: 110 MMHG | OXYGEN SATURATION: 98 % | TEMPERATURE: 96.9 F | BODY MASS INDEX: 23.81 KG/M2 | HEART RATE: 73 BPM

## 2025-04-28 DIAGNOSIS — J40 BRONCHITIS: ICD-10-CM

## 2025-04-28 DIAGNOSIS — E11.9 TYPE 2 DIABETES MELLITUS WITHOUT COMPLICATION, WITHOUT LONG-TERM CURRENT USE OF INSULIN (HCC): Primary | ICD-10-CM

## 2025-04-28 LAB — HBA1C MFR BLD: 8.3 %

## 2025-04-28 PROCEDURE — 3078F DIAST BP <80 MM HG: CPT | Performed by: NURSE PRACTITIONER

## 2025-04-28 PROCEDURE — 83036 HEMOGLOBIN GLYCOSYLATED A1C: CPT | Performed by: NURSE PRACTITIONER

## 2025-04-28 PROCEDURE — 99214 OFFICE O/P EST MOD 30 MIN: CPT | Performed by: NURSE PRACTITIONER

## 2025-04-28 PROCEDURE — 1123F ACP DISCUSS/DSCN MKR DOCD: CPT | Performed by: NURSE PRACTITIONER

## 2025-04-28 PROCEDURE — 1159F MED LIST DOCD IN RCRD: CPT | Performed by: NURSE PRACTITIONER

## 2025-04-28 PROCEDURE — 3074F SYST BP LT 130 MM HG: CPT | Performed by: NURSE PRACTITIONER

## 2025-04-28 PROCEDURE — G2211 COMPLEX E/M VISIT ADD ON: HCPCS | Performed by: NURSE PRACTITIONER

## 2025-04-28 PROCEDURE — 3052F HG A1C>EQUAL 8.0%<EQUAL 9.0%: CPT | Performed by: NURSE PRACTITIONER

## 2025-04-28 RX ORDER — DOXYCYCLINE HYCLATE 100 MG
100 TABLET ORAL 2 TIMES DAILY
Qty: 14 TABLET | Refills: 0 | Status: SHIPPED | OUTPATIENT
Start: 2025-04-28 | End: 2025-05-05

## 2025-04-28 RX ORDER — PREDNISONE 10 MG/1
10 TABLET ORAL 2 TIMES DAILY
Qty: 10 TABLET | Refills: 0 | Status: SHIPPED | OUTPATIENT
Start: 2025-04-28 | End: 2025-05-03

## 2025-04-28 ASSESSMENT — ENCOUNTER SYMPTOMS
RHINORRHEA: 0
EYE ITCHING: 0
WHEEZING: 1
SORE THROAT: 0
NAUSEA: 0
COUGH: 1
DIARRHEA: 0
EYE REDNESS: 0
BACK PAIN: 0
SINUS PRESSURE: 0
ABDOMINAL PAIN: 0
CONSTIPATION: 0
CHEST TIGHTNESS: 1
SHORTNESS OF BREATH: 0
BLOOD IN STOOL: 0
COLOR CHANGE: 0
VOMITING: 0

## 2025-04-28 ASSESSMENT — PATIENT HEALTH QUESTIONNAIRE - PHQ9
2. FEELING DOWN, DEPRESSED OR HOPELESS: NOT AT ALL
SUM OF ALL RESPONSES TO PHQ QUESTIONS 1-9: 0
SUM OF ALL RESPONSES TO PHQ QUESTIONS 1-9: 0
1. LITTLE INTEREST OR PLEASURE IN DOING THINGS: NOT AT ALL
SUM OF ALL RESPONSES TO PHQ QUESTIONS 1-9: 0
SUM OF ALL RESPONSES TO PHQ QUESTIONS 1-9: 0

## 2025-04-28 NOTE — ASSESSMENT & PLAN NOTE
- Symptoms include wheezing, crackles, and bubbles, with a normal x-ray ruling out pneumonia.  - Physical exam findings confirm bronchitis.  - Discussed the condition and prescribed antibiotics and prednisone, noting the potential for increased blood sugar levels.  - Administered a nebulizer treatment to clear the lungs; advised to report if no improvement.

## 2025-04-28 NOTE — TELEPHONE ENCOUNTER
KATE FROM [facility] CALLING TO GIVE PATIENT WEIGHT. On 4/26/.6, 4/27/.5,and today it is 152.8

## 2025-04-28 NOTE — PROGRESS NOTES
Shelley Maurer (:  1945) is a 80 y.o. female,Established patient, here for evaluation of the following chief complaint(s):  Weight Management (Sugar issues)      ASSESSMENT/PLAN:  1. Type 2 diabetes mellitus without complication, without long-term current use of insulin ([facility])  Assessment & Plan:   - Blood sugar levels have improved, though a significant drop was noted recently.  - Adjusted insulin dosage to 10 units with meals and 10 units of Lantus at night.  - Weight has decreased by 7 pounds, which is consistent with better blood sugar control.  - Ordered an A1c test to monitor progress.  Orders:  -     POCT glycosylated hemoglobin (Hb A1C)  2. Bronchitis  Assessment & Plan:   - Symptoms include wheezing, crackles, and bubbles, with a normal x-ray ruling out pneumonia.  - Physical exam findings confirm bronchitis.  - Discussed the condition and prescribed antibiotics and prednisone, noting the potential for increased blood sugar levels.  - Administered a nebulizer treatment to clear the lungs; advised to report if no improvement.      Assessment & Plan      No follow-ups on file.    SUBJECTIVE/OBJECTIVE:    History of Present Illness  The patient is an 80-year-old female who presents for evaluation of bronchitis and diabetes. She is accompanied by Horacio.    A significant improvement in respiratory status is reported, following severe dyspnea over the past few days. Symptoms included wheezing, crackles, and bubbling sensations. Lasix was administered yesterday, which is believed to have been beneficial. An inhaler has also been used as part of the treatment regimen.    Blood sugars are improving, although a very low blood sugar episode occurred recently. Insulin has been adjusted to 10 units with meals and 10 units of Lantus at night. A weight loss of about 7 pounds in a month is noted, attributed to better control of diabetes.      Current Outpatient Medications   Medication Sig Dispense Refill

## 2025-04-28 NOTE — ASSESSMENT & PLAN NOTE
- Blood sugar levels have improved, though a significant drop was noted recently.  - Adjusted insulin dosage to 10 units with meals and 10 units of Lantus at night.  - Weight has decreased by 7 pounds, which is consistent with better blood sugar control.  - Ordered an A1c test to monitor progress.

## 2025-05-28 ENCOUNTER — TELEPHONE (OUTPATIENT)
Dept: FAMILY MEDICINE CLINIC | Age: 80
End: 2025-05-28

## 2025-05-28 NOTE — TELEPHONE ENCOUNTER
Repr. Calling to see if we rcvd. Plan of care  & occupational therapy documents, non on file, verified fax# & asked her to fax them again.

## 2025-05-30 ENCOUNTER — TELEPHONE (OUTPATIENT)
Dept: FAMILY MEDICINE CLINIC | Age: 80
End: 2025-05-30

## 2025-05-30 NOTE — TELEPHONE ENCOUNTER
Terese([facility]) [phone] calling to about lab orders faxed also possible UTI

## 2025-05-31 RX ORDER — CIPROFLOXACIN 250 MG/1
250 TABLET, FILM COATED ORAL 2 TIMES DAILY
Qty: 10 TABLET | Refills: 0 | Status: SHIPPED | OUTPATIENT
Start: 2025-05-31 | End: 2025-05-31 | Stop reason: SDUPTHER

## 2025-05-31 RX ORDER — CIPROFLOXACIN 250 MG/1
250 TABLET, FILM COATED ORAL 2 TIMES DAILY
Qty: 10 TABLET | Refills: 0 | Status: SHIPPED | OUTPATIENT
Start: 2025-05-31 | End: 2025-06-05

## 2025-06-02 NOTE — TELEPHONE ENCOUNTER
Jacki called back and stated pt is on ABX Cipro 250 mg for UTI. Will call back if needed for clarification on lab orders

## 2025-06-09 RX ORDER — QUETIAPINE FUMARATE 100 MG/1
TABLET, FILM COATED ORAL
Qty: 30 TABLET | Refills: 11 | Status: SHIPPED | OUTPATIENT
Start: 2025-06-09

## 2025-06-20 ENCOUNTER — TELEPHONE (OUTPATIENT)
Dept: FAMILY MEDICINE CLINIC | Age: 80
End: 2025-06-20

## 2025-06-20 NOTE — TELEPHONE ENCOUNTER
Returned call regarding medication changes. Kristina confirmed understanding with no additional questions or concerns.

## 2025-06-25 RX ORDER — BACLOFEN 10 MG/1
10 TABLET ORAL 3 TIMES DAILY PRN
Qty: 21 TABLET | Refills: 0 | Status: SHIPPED | OUTPATIENT
Start: 2025-06-25 | End: 2025-07-02

## 2025-06-25 NOTE — TELEPHONE ENCOUNTER
Medication:   Requested Prescriptions      No prescriptions requested or ordered in this encounter        Last Filled:      Patient Phone Number: [phone] (home) [phone] (work)    Last appt: 4/28/2025   Next appt: 7/17/2025    Last OARRS:        No data to display

## 2025-06-25 NOTE — TELEPHONE ENCOUNTER
Kristina from [facility], called requesting refill for patient's [facility] [place] [place] - [address] [phone] - F [phone]

## 2025-07-17 ENCOUNTER — OFFICE VISIT (OUTPATIENT)
Dept: FAMILY MEDICINE CLINIC | Age: 80
End: 2025-07-17

## 2025-07-17 VITALS
HEIGHT: 68 IN | DIASTOLIC BLOOD PRESSURE: 74 MMHG | BODY MASS INDEX: 25.31 KG/M2 | HEART RATE: 80 BPM | SYSTOLIC BLOOD PRESSURE: 126 MMHG | OXYGEN SATURATION: 97 % | WEIGHT: 167 LBS

## 2025-07-17 DIAGNOSIS — I10 PRIMARY HYPERTENSION: ICD-10-CM

## 2025-07-17 DIAGNOSIS — E78.2 MIXED HYPERLIPIDEMIA: ICD-10-CM

## 2025-07-17 DIAGNOSIS — I50.20 HFREF (HEART FAILURE WITH REDUCED EJECTION FRACTION) (HCC): ICD-10-CM

## 2025-07-17 DIAGNOSIS — E11.40 CONTROLLED TYPE 2 DIABETES MELLITUS WITH DIABETIC NEUROPATHY, WITHOUT LONG-TERM CURRENT USE OF INSULIN (HCC): Primary | ICD-10-CM

## 2025-07-17 DIAGNOSIS — E11.9 TYPE 2 DIABETES MELLITUS WITHOUT COMPLICATION, WITHOUT LONG-TERM CURRENT USE OF INSULIN (HCC): ICD-10-CM

## 2025-07-17 PROBLEM — J18.9 PNEUMONIA: Status: RESOLVED | Noted: 2024-06-03 | Resolved: 2025-07-17

## 2025-07-17 PROBLEM — N17.9 AKI (ACUTE KIDNEY INJURY): Status: RESOLVED | Noted: 2025-04-09 | Resolved: 2025-07-17

## 2025-07-17 LAB — HBA1C MFR BLD: 7.3 %

## 2025-07-17 RX ORDER — SACUBITRIL AND VALSARTAN 24; 26 MG/1; MG/1
1 TABLET, FILM COATED ORAL 2 TIMES DAILY
Qty: 180 TABLET | Refills: 0 | Status: SHIPPED | OUTPATIENT
Start: 2025-07-17 | End: 2025-07-17

## 2025-07-17 RX ORDER — SACUBITRIL AND VALSARTAN 24; 26 MG/1; MG/1
1 TABLET, FILM COATED ORAL 2 TIMES DAILY
Qty: 180 TABLET | Refills: 0 | Status: SHIPPED | OUTPATIENT
Start: 2025-07-17 | End: 2025-10-15

## 2025-07-17 RX ORDER — GABAPENTIN 100 MG/1
100 CAPSULE ORAL NIGHTLY
Qty: 90 CAPSULE | Refills: 1 | Status: SHIPPED | OUTPATIENT
Start: 2025-07-17 | End: 2026-01-13

## 2025-07-17 ASSESSMENT — PATIENT HEALTH QUESTIONNAIRE - PHQ9
2. FEELING DOWN, DEPRESSED OR HOPELESS: NOT AT ALL
SUM OF ALL RESPONSES TO PHQ QUESTIONS 1-9: 0
1. LITTLE INTEREST OR PLEASURE IN DOING THINGS: NOT AT ALL
SUM OF ALL RESPONSES TO PHQ QUESTIONS 1-9: 0

## 2025-07-17 ASSESSMENT — ENCOUNTER SYMPTOMS
CONSTIPATION: 0
WHEEZING: 0
BACK PAIN: 0
SINUS PAIN: 0
COLOR CHANGE: 0
COUGH: 0
DIARRHEA: 0
SINUS PRESSURE: 0
ABDOMINAL PAIN: 0
SHORTNESS OF BREATH: 0

## 2025-07-17 NOTE — ASSESSMENT & PLAN NOTE
Chronic, at goal (stable), following with cardiology.  No sign of fluid overload.  Continue Entresto.

## 2025-07-17 NOTE — ASSESSMENT & PLAN NOTE
Chronic, not at goal (unstable), neuropathy is especially persistent at night.  Will trial low-dose gabapentin to be used at bedtime.  Follow-up in 3 months or sooner if needed.

## 2025-07-17 NOTE — PROGRESS NOTES
Shelley Maurer (:  1945) is a 80 y.o. female,Established patient, here for evaluation of the following chief complaint(s):  No chief complaint on file.      ASSESSMENT/PLAN:  Assessment & Plan  Controlled type 2 diabetes mellitus with diabetic neuropathy, without long-term current use of insulin (HCC)   Chronic, not at goal (unstable), neuropathy is especially persistent at night.  Will trial low-dose gabapentin to be used at bedtime.  Follow-up in 3 months or sooner if needed.  HFrEF (heart failure with reduced ejection fraction) ([facility])   Chronic, at goal (stable), following with cardiology.  No sign of fluid overload.  Continue Entresto.  Mixed hyperlipidemia   Chronic, at goal (stable), continue Lipitor.  Type 2 diabetes mellitus without complication, without long-term current use of insulin ([facility])   Chronic, at goal (stable), hypoglycemia is improving.  Will continue insulin at current dose.  Continue Jardiance.  Follow-up in 4 months  Primary hypertension   Chronic, at goal (stable), continue Entresto       No follow-ups on file.    SUBJECTIVE/OBJECTIVE:    History of Present Illness  The patient is an 80-year-old female who presents for a follow-up visit.    She reports significant weight gain in her abdominal area, which she attributes to her insulin use. She occasionally experiences episodes of low blood sugar, with readings in the 50s, and manages these by consuming snacks such as candy, chips, and glucose tablets. These hypoglycemic episodes occur sporadically and are improving. Her blood sugar levels typically remain around 170 at night but fluctuate during the day. Despite these challenges, she maintains a healthy diet and avoids simple sugars. She resides in an assisted living facility where she receives Lantus insulin 10 units and Humalog 10 units.    She also reports a lack of physical activity and feelings of abdominal distension and bloating. However, her bowel movements have been regular

## 2025-07-17 NOTE — ASSESSMENT & PLAN NOTE
Chronic, at goal (stable), hypoglycemia is improving.  Will continue insulin at current dose.  Continue Jardiance.  Follow-up in 4 months

## 2025-08-13 RX ORDER — NITROFURANTOIN 25; 75 MG/1; MG/1
100 CAPSULE ORAL 2 TIMES DAILY
Qty: 10 CAPSULE | Refills: 0 | Status: SHIPPED | OUTPATIENT
Start: 2025-08-13 | End: 2025-08-18

## 2025-09-03 ENCOUNTER — OFFICE VISIT (OUTPATIENT)
Dept: CARDIOLOGY CLINIC | Age: 80
End: 2025-09-03
Payer: MEDICARE

## 2025-09-03 VITALS
HEART RATE: 66 BPM | BODY MASS INDEX: 25.91 KG/M2 | SYSTOLIC BLOOD PRESSURE: 116 MMHG | HEIGHT: 68 IN | WEIGHT: 171 LBS | DIASTOLIC BLOOD PRESSURE: 60 MMHG

## 2025-09-03 DIAGNOSIS — E11.40 CONTROLLED TYPE 2 DIABETES MELLITUS WITH DIABETIC NEUROPATHY, WITHOUT LONG-TERM CURRENT USE OF INSULIN (HCC): ICD-10-CM

## 2025-09-03 DIAGNOSIS — I50.20 HFREF (HEART FAILURE WITH REDUCED EJECTION FRACTION) (HCC): ICD-10-CM

## 2025-09-03 DIAGNOSIS — E78.2 MIXED HYPERLIPIDEMIA: ICD-10-CM

## 2025-09-03 DIAGNOSIS — E11.9 TYPE 2 DIABETES MELLITUS WITHOUT COMPLICATION, WITHOUT LONG-TERM CURRENT USE OF INSULIN (HCC): Primary | ICD-10-CM

## 2025-09-03 DIAGNOSIS — N18.32 STAGE 3B CHRONIC KIDNEY DISEASE (HCC): Chronic | ICD-10-CM

## 2025-09-03 PROCEDURE — 3078F DIAST BP <80 MM HG: CPT | Performed by: NURSE PRACTITIONER

## 2025-09-03 PROCEDURE — 3074F SYST BP LT 130 MM HG: CPT | Performed by: NURSE PRACTITIONER

## 2025-09-03 PROCEDURE — 99215 OFFICE O/P EST HI 40 MIN: CPT | Performed by: NURSE PRACTITIONER

## 2025-09-03 PROCEDURE — 1159F MED LIST DOCD IN RCRD: CPT | Performed by: NURSE PRACTITIONER

## 2025-09-03 PROCEDURE — 3051F HG A1C>EQUAL 7.0%<8.0%: CPT | Performed by: NURSE PRACTITIONER

## 2025-09-03 PROCEDURE — 1123F ACP DISCUSS/DSCN MKR DOCD: CPT | Performed by: NURSE PRACTITIONER

## 2025-09-03 RX ORDER — BISOPROLOL FUMARATE AND HYDROCHLOROTHIAZIDE 5; 6.25 MG/1; MG/1
1 TABLET ORAL DAILY
COMMUNITY

## 2025-09-03 RX ORDER — BACLOFEN 10 MG/1
TABLET ORAL
COMMUNITY
Start: 2025-07-16